# Patient Record
Sex: FEMALE | Race: BLACK OR AFRICAN AMERICAN | NOT HISPANIC OR LATINO | Employment: OTHER | URBAN - METROPOLITAN AREA
[De-identification: names, ages, dates, MRNs, and addresses within clinical notes are randomized per-mention and may not be internally consistent; named-entity substitution may affect disease eponyms.]

---

## 2017-01-12 ENCOUNTER — HOSPITAL ENCOUNTER (OUTPATIENT)
Dept: ONCOLOGY | Facility: CLINIC | Age: 77
Discharge: HOME | End: 2017-01-12
Attending: INTERNAL MEDICINE | Admitting: INTERNAL MEDICINE

## 2017-02-07 ENCOUNTER — HOSPITAL ENCOUNTER (OUTPATIENT)
Dept: ONCOLOGY | Facility: CLINIC | Age: 77
Discharge: HOME | End: 2017-02-07
Attending: INTERNAL MEDICINE | Admitting: INTERNAL MEDICINE

## 2017-03-07 ENCOUNTER — HOSPITAL ENCOUNTER (OUTPATIENT)
Dept: ONCOLOGY | Facility: CLINIC | Age: 77
Discharge: HOME | End: 2017-03-07
Attending: INTERNAL MEDICINE | Admitting: INTERNAL MEDICINE

## 2017-04-07 ENCOUNTER — HOSPITAL ENCOUNTER (OUTPATIENT)
Dept: ONCOLOGY | Facility: CLINIC | Age: 77
Discharge: HOME | End: 2017-04-07
Attending: INTERNAL MEDICINE | Admitting: INTERNAL MEDICINE

## 2017-04-07 ENCOUNTER — HOSPITAL ENCOUNTER (OUTPATIENT)
Dept: OTHER | Facility: HOSPITAL | Age: 77
Discharge: HOME | End: 2017-04-07
Attending: NURSE PRACTITIONER | Admitting: NURSE PRACTITIONER

## 2017-04-07 ENCOUNTER — CONVERSION ENCOUNTER (OUTPATIENT)
Dept: FAMILY MEDICINE CLINIC | Facility: CLINIC | Age: 77
End: 2017-04-07

## 2017-04-14 ENCOUNTER — HOSPITAL ENCOUNTER (OUTPATIENT)
Dept: ONCOLOGY | Facility: CLINIC | Age: 77
Discharge: HOME | End: 2017-04-14
Attending: INTERNAL MEDICINE | Admitting: INTERNAL MEDICINE

## 2017-04-21 ENCOUNTER — HOSPITAL ENCOUNTER (OUTPATIENT)
Dept: ONCOLOGY | Facility: CLINIC | Age: 77
Discharge: HOME | End: 2017-04-21
Attending: INTERNAL MEDICINE | Admitting: INTERNAL MEDICINE

## 2017-04-28 ENCOUNTER — HOSPITAL ENCOUNTER (OUTPATIENT)
Dept: ONCOLOGY | Facility: CLINIC | Age: 77
Discharge: HOME | End: 2017-04-28
Attending: INTERNAL MEDICINE | Admitting: INTERNAL MEDICINE

## 2017-05-26 ENCOUNTER — HOSPITAL ENCOUNTER (OUTPATIENT)
Dept: ONCOLOGY | Facility: CLINIC | Age: 77
Discharge: HOME | End: 2017-05-26
Attending: INTERNAL MEDICINE | Admitting: INTERNAL MEDICINE

## 2017-06-02 ENCOUNTER — HOSPITAL ENCOUNTER (OUTPATIENT)
Dept: ONCOLOGY | Facility: CLINIC | Age: 77
Discharge: HOME | End: 2017-06-02
Attending: INTERNAL MEDICINE | Admitting: INTERNAL MEDICINE

## 2017-06-06 ENCOUNTER — HOSPITAL ENCOUNTER (OUTPATIENT)
Dept: ONCOLOGY | Facility: CLINIC | Age: 77
Discharge: HOME | End: 2017-06-06
Attending: INTERNAL MEDICINE | Admitting: INTERNAL MEDICINE

## 2017-06-13 ENCOUNTER — HOSPITAL ENCOUNTER (OUTPATIENT)
Dept: ONCOLOGY | Facility: CLINIC | Age: 77
Discharge: HOME | End: 2017-06-13
Attending: INTERNAL MEDICINE | Admitting: INTERNAL MEDICINE

## 2017-06-21 ENCOUNTER — HOSPITAL ENCOUNTER (OUTPATIENT)
Dept: ONCOLOGY | Facility: CLINIC | Age: 77
Discharge: HOME | End: 2017-06-21
Attending: INTERNAL MEDICINE | Admitting: INTERNAL MEDICINE

## 2017-06-30 ENCOUNTER — HOSPITAL ENCOUNTER (OUTPATIENT)
Dept: ONCOLOGY | Facility: CLINIC | Age: 77
Discharge: HOME | End: 2017-06-30
Attending: INTERNAL MEDICINE | Admitting: INTERNAL MEDICINE

## 2017-07-28 ENCOUNTER — HOSPITAL ENCOUNTER (OUTPATIENT)
Dept: ONCOLOGY | Facility: CLINIC | Age: 77
Discharge: HOME | End: 2017-07-28
Attending: INTERNAL MEDICINE | Admitting: INTERNAL MEDICINE

## 2017-08-25 ENCOUNTER — HOSPITAL ENCOUNTER (OUTPATIENT)
Dept: ONCOLOGY | Facility: HOSPITAL | Age: 77
Discharge: HOME | End: 2017-08-25
Attending: INTERNAL MEDICINE | Admitting: INTERNAL MEDICINE

## 2017-08-25 ENCOUNTER — HOSPITAL ENCOUNTER (OUTPATIENT)
Dept: ONCOLOGY | Facility: CLINIC | Age: 77
Setting detail: INFUSION SERIES
Discharge: HOME | End: 2017-08-25
Attending: INTERNAL MEDICINE | Admitting: INTERNAL MEDICINE

## 2017-08-25 ENCOUNTER — CLINICAL SUPPORT (OUTPATIENT)
Dept: ONCOLOGY | Facility: HOSPITAL | Age: 77
End: 2017-08-25

## 2017-08-25 NOTE — PROGRESS NOTES
PATIENTS ONCOLOGY RECORD LOCATED IN [facility]      Subjective     Name:  ORLY DOLL     Date:  2017  Address:  [address] [address]  Home: [phone]  :  1940 AGE:  77 y.o.        RECORDS OBTAINED:  Patients Oncology Record is located in [facility]

## 2017-10-03 ENCOUNTER — HOSPITAL ENCOUNTER (OUTPATIENT)
Dept: ONCOLOGY | Facility: HOSPITAL | Age: 77
Discharge: HOME | End: 2017-10-03
Attending: INTERNAL MEDICINE | Admitting: INTERNAL MEDICINE

## 2017-10-03 ENCOUNTER — HOSPITAL ENCOUNTER (OUTPATIENT)
Dept: ONCOLOGY | Facility: CLINIC | Age: 77
Setting detail: INFUSION SERIES
Discharge: HOME | End: 2017-10-03
Attending: INTERNAL MEDICINE | Admitting: INTERNAL MEDICINE

## 2017-10-03 ENCOUNTER — CLINICAL SUPPORT (OUTPATIENT)
Dept: ONCOLOGY | Facility: HOSPITAL | Age: 77
End: 2017-10-03

## 2017-10-03 NOTE — PROGRESS NOTES
PATIENTS ONCOLOGY RECORD LOCATED IN [facility]      Subjective     Name:  ORLY DOLL     Date:  10/03/2017  Address:  [address] [address]  Home: [phone]  :  1940 AGE:  77 y.o.        RECORDS OBTAINED:  Patients Oncology Record is located in [facility]

## 2017-10-16 ENCOUNTER — HOSPITAL ENCOUNTER (OUTPATIENT)
Dept: ONCOLOGY | Facility: HOSPITAL | Age: 77
Discharge: HOME | End: 2017-10-16
Attending: INTERNAL MEDICINE | Admitting: INTERNAL MEDICINE

## 2017-10-16 ENCOUNTER — HOSPITAL ENCOUNTER (OUTPATIENT)
Dept: ONCOLOGY | Facility: CLINIC | Age: 77
Setting detail: INFUSION SERIES
Discharge: HOME | End: 2017-10-16
Attending: INTERNAL MEDICINE | Admitting: INTERNAL MEDICINE

## 2017-10-16 ENCOUNTER — CLINICAL SUPPORT (OUTPATIENT)
Dept: ONCOLOGY | Facility: HOSPITAL | Age: 77
End: 2017-10-16

## 2017-10-16 NOTE — PROGRESS NOTES
PATIENTS ONCOLOGY RECORD LOCATED IN [facility]      Subjective     Name:  ORLY DOLL     Date:  10/16/2017  Address:  [address] [id number]  Home: [phone]  :  1940 AGE:  77 y.o.        RECORDS OBTAINED:  Patients Oncology Record is located in [facility]

## 2017-10-18 ENCOUNTER — CLINICAL SUPPORT (OUTPATIENT)
Dept: ONCOLOGY | Facility: HOSPITAL | Age: 77
End: 2017-10-18

## 2017-10-18 ENCOUNTER — HOSPITAL ENCOUNTER (OUTPATIENT)
Dept: ONCOLOGY | Facility: CLINIC | Age: 77
Setting detail: INFUSION SERIES
Discharge: HOME | End: 2017-10-18
Attending: INTERNAL MEDICINE | Admitting: INTERNAL MEDICINE

## 2017-10-18 ENCOUNTER — HOSPITAL ENCOUNTER (OUTPATIENT)
Dept: ONCOLOGY | Facility: HOSPITAL | Age: 77
Discharge: HOME | End: 2017-10-18
Attending: INTERNAL MEDICINE | Admitting: INTERNAL MEDICINE

## 2017-10-19 ENCOUNTER — HOSPITAL ENCOUNTER (OUTPATIENT)
Dept: ONCOLOGY | Facility: CLINIC | Age: 77
Setting detail: INFUSION SERIES
Discharge: HOME | End: 2017-10-19
Attending: INTERNAL MEDICINE | Admitting: INTERNAL MEDICINE

## 2017-10-19 ENCOUNTER — HOSPITAL ENCOUNTER (OUTPATIENT)
Dept: ONCOLOGY | Facility: HOSPITAL | Age: 77
Discharge: HOME | End: 2017-10-19
Attending: INTERNAL MEDICINE | Admitting: INTERNAL MEDICINE

## 2017-10-19 ENCOUNTER — CLINICAL SUPPORT (OUTPATIENT)
Dept: ONCOLOGY | Facility: HOSPITAL | Age: 77
End: 2017-10-19

## 2017-10-19 NOTE — PROGRESS NOTES
PATIENTS ONCOLOGY RECORD LOCATED IN [facility]      Subjective     Name:  ORLY DOLL     Date:  10/19/2017  Address:  [address] [address]  Home: [phone]  :  1940 AGE:  77 y.o.        RECORDS OBTAINED:  Patients Oncology Record is located in [facility]

## 2017-10-20 ENCOUNTER — CLINICAL SUPPORT (OUTPATIENT)
Dept: ONCOLOGY | Facility: HOSPITAL | Age: 77
End: 2017-10-20

## 2017-10-20 ENCOUNTER — HOSPITAL ENCOUNTER (OUTPATIENT)
Dept: ONCOLOGY | Facility: CLINIC | Age: 77
Setting detail: INFUSION SERIES
Discharge: HOME | End: 2017-10-20
Attending: INTERNAL MEDICINE | Admitting: INTERNAL MEDICINE

## 2017-10-20 ENCOUNTER — HOSPITAL ENCOUNTER (OUTPATIENT)
Dept: ONCOLOGY | Facility: HOSPITAL | Age: 77
Discharge: HOME | End: 2017-10-20
Attending: INTERNAL MEDICINE | Admitting: INTERNAL MEDICINE

## 2017-10-20 NOTE — PROGRESS NOTES
PATIENTS ONCOLOGY RECORD LOCATED IN [facility]      Subjective     Name:  ORLY DOLL     Date:  10/20/2017  Address:  [address] [address]  Home: [phone]  :  1940 AGE:  77 y.o.        RECORDS OBTAINED:  Patients Oncology Record is located in [facility]

## 2017-10-22 ENCOUNTER — HOSPITAL ENCOUNTER (OUTPATIENT)
Dept: INFUSION THERAPY | Facility: HOSPITAL | Age: 77
Discharge: HOME | End: 2017-10-22
Attending: INTERNAL MEDICINE | Admitting: INTERNAL MEDICINE

## 2017-10-22 LAB
INR PPP: 1.6 (ref 2–3)
PROTHROMBIN TIME: 16.5 SEC (ref 19.4–28.5)

## 2017-10-23 ENCOUNTER — CLINICAL SUPPORT (OUTPATIENT)
Dept: ONCOLOGY | Facility: HOSPITAL | Age: 77
End: 2017-10-23

## 2017-10-23 ENCOUNTER — HOSPITAL ENCOUNTER (OUTPATIENT)
Dept: ONCOLOGY | Facility: CLINIC | Age: 77
Setting detail: INFUSION SERIES
Discharge: HOME | End: 2017-10-23
Attending: INTERNAL MEDICINE | Admitting: INTERNAL MEDICINE

## 2017-10-23 ENCOUNTER — HOSPITAL ENCOUNTER (OUTPATIENT)
Dept: ONCOLOGY | Facility: HOSPITAL | Age: 77
Discharge: HOME | End: 2017-10-23
Attending: INTERNAL MEDICINE | Admitting: INTERNAL MEDICINE

## 2017-10-23 NOTE — PROGRESS NOTES
PATIENTS ONCOLOGY RECORD LOCATED IN [facility]      Subjective     Name:  ORLY DOLL     Date:  10/23/2017  Address:  [address] [address]  Home: [phone]  :  1940 AGE:  77 y.o.        RECORDS OBTAINED:  Patients Oncology Record is located in [facility]

## 2017-10-24 ENCOUNTER — HOSPITAL ENCOUNTER (OUTPATIENT)
Dept: ONCOLOGY | Facility: CLINIC | Age: 77
Setting detail: INFUSION SERIES
Discharge: HOME | End: 2017-10-24
Attending: INTERNAL MEDICINE | Admitting: INTERNAL MEDICINE

## 2017-10-24 ENCOUNTER — HOSPITAL ENCOUNTER (OUTPATIENT)
Dept: ONCOLOGY | Facility: HOSPITAL | Age: 77
Discharge: HOME | End: 2017-10-24
Attending: INTERNAL MEDICINE | Admitting: INTERNAL MEDICINE

## 2017-10-24 ENCOUNTER — CLINICAL SUPPORT (OUTPATIENT)
Dept: ONCOLOGY | Facility: HOSPITAL | Age: 77
End: 2017-10-24

## 2017-10-24 NOTE — PROGRESS NOTES
PATIENTS ONCOLOGY RECORD LOCATED IN [facility]      Subjective     Name:  ORLY DOLL     Date:  10/24/2017  Address:  [address] [address]  Home: [phone]  :  1940 AGE:  77 y.o.        RECORDS OBTAINED:  Patients Oncology Record is located in [facility]

## 2017-10-30 ENCOUNTER — HOSPITAL ENCOUNTER (OUTPATIENT)
Dept: ONCOLOGY | Facility: CLINIC | Age: 77
Setting detail: INFUSION SERIES
Discharge: HOME | End: 2017-10-30
Attending: INTERNAL MEDICINE | Admitting: INTERNAL MEDICINE

## 2017-10-30 ENCOUNTER — CLINICAL SUPPORT (OUTPATIENT)
Dept: ONCOLOGY | Facility: HOSPITAL | Age: 77
End: 2017-10-30

## 2017-10-30 ENCOUNTER — HOSPITAL ENCOUNTER (OUTPATIENT)
Dept: ONCOLOGY | Facility: HOSPITAL | Age: 77
Discharge: HOME | End: 2017-10-30
Attending: INTERNAL MEDICINE | Admitting: INTERNAL MEDICINE

## 2017-10-30 LAB
ALBUMIN SERPL-MCNC: 3.3 G/DL (ref 3.5–4.8)
ALBUMIN/GLOB SERPL: 0.8 {RATIO} (ref 1–1.7)
ALP SERPL-CCNC: 154 IU/L (ref 32–91)
ALT SERPL-CCNC: 25 IU/L (ref 14–54)
ANION GAP SERPL CALC-SCNC: 13.2 MMOL/L (ref 10–20)
AST SERPL-CCNC: 32 IU/L (ref 15–41)
BILIRUB SERPL-MCNC: 0.7 MG/DL (ref 0.3–1.2)
BUN SERPL-MCNC: 10 MG/DL (ref 8–20)
BUN/CREAT SERPL: 11.1 (ref 5.4–26.2)
CALCIUM SERPL-MCNC: 9.9 MG/DL (ref 8.9–10.3)
CHLORIDE SERPL-SCNC: 101 MMOL/L (ref 101–111)
CONV CO2: 30 MMOL/L (ref 22–32)
CONV TOTAL PROTEIN: 7.4 G/DL (ref 6.1–7.9)
CREAT UR-MCNC: 0.9 MG/DL (ref 0.4–1)
GLOBULIN UR ELPH-MCNC: 4.1 G/DL (ref 2.5–3.8)
GLUCOSE SERPL-MCNC: 109 MG/DL (ref 65–99)
POTASSIUM SERPL-SCNC: 4.2 MMOL/L (ref 3.6–5.1)
SODIUM SERPL-SCNC: 140 MMOL/L (ref 136–144)

## 2017-10-30 NOTE — PROGRESS NOTES
PATIENTS ONCOLOGY RECORD LOCATED IN [facility]      Subjective     Name:  ORLY DOLL     Date:  10/30/2017  Address:  [address] [id number]  Home: [phone]  :  1940 AGE:  77 y.o.        RECORDS OBTAINED:  Patients Oncology Record is located in [facility]

## 2017-11-07 ENCOUNTER — CLINICAL SUPPORT (OUTPATIENT)
Dept: ONCOLOGY | Facility: HOSPITAL | Age: 77
End: 2017-11-07

## 2017-11-07 ENCOUNTER — HOSPITAL ENCOUNTER (OUTPATIENT)
Dept: ONCOLOGY | Facility: HOSPITAL | Age: 77
Discharge: HOME | End: 2017-11-07
Attending: INTERNAL MEDICINE | Admitting: INTERNAL MEDICINE

## 2017-11-07 ENCOUNTER — HOSPITAL ENCOUNTER (OUTPATIENT)
Dept: ONCOLOGY | Facility: CLINIC | Age: 77
Setting detail: INFUSION SERIES
Discharge: HOME | End: 2017-11-07
Attending: INTERNAL MEDICINE | Admitting: INTERNAL MEDICINE

## 2017-11-07 NOTE — PROGRESS NOTES
PATIENTS ONCOLOGY RECORD LOCATED IN [facility]      Subjective     Name:  ORLY DOLL     Date:  2017  Address:  [address] [address]  Home: [phone]  :  1940 AGE:  77 y.o.        RECORDS OBTAINED:  Patients Oncology Record is located in [facility]

## 2017-11-10 ENCOUNTER — CLINICAL SUPPORT (OUTPATIENT)
Dept: ONCOLOGY | Facility: HOSPITAL | Age: 77
End: 2017-11-10

## 2017-11-10 ENCOUNTER — HOSPITAL ENCOUNTER (OUTPATIENT)
Dept: ONCOLOGY | Facility: CLINIC | Age: 77
Setting detail: INFUSION SERIES
Discharge: HOME | End: 2017-11-10
Attending: INTERNAL MEDICINE | Admitting: INTERNAL MEDICINE

## 2017-11-10 ENCOUNTER — HOSPITAL ENCOUNTER (OUTPATIENT)
Dept: ONCOLOGY | Facility: HOSPITAL | Age: 77
Discharge: HOME | End: 2017-11-10
Attending: INTERNAL MEDICINE | Admitting: INTERNAL MEDICINE

## 2017-11-10 NOTE — PROGRESS NOTES
PATIENTS ONCOLOGY RECORD LOCATED IN [facility]      Subjective     Name:  ORLY DOLL     Date:  11/10/2017  Address:  [address] [address]  Home: [phone]  :  1940 AGE:  77 y.o.        RECORDS OBTAINED:  Patients Oncology Record is located in [facility]

## 2017-11-17 ENCOUNTER — HOSPITAL ENCOUNTER (OUTPATIENT)
Dept: ONCOLOGY | Facility: CLINIC | Age: 77
Setting detail: INFUSION SERIES
Discharge: HOME | End: 2017-11-17
Attending: INTERNAL MEDICINE | Admitting: INTERNAL MEDICINE

## 2017-11-17 ENCOUNTER — HOSPITAL ENCOUNTER (OUTPATIENT)
Dept: ONCOLOGY | Facility: HOSPITAL | Age: 77
Discharge: HOME | End: 2017-11-17
Attending: INTERNAL MEDICINE | Admitting: INTERNAL MEDICINE

## 2017-11-17 ENCOUNTER — CLINICAL SUPPORT (OUTPATIENT)
Dept: ONCOLOGY | Facility: HOSPITAL | Age: 77
End: 2017-11-17

## 2017-11-17 NOTE — PROGRESS NOTES
PATIENTS ONCOLOGY RECORD LOCATED IN [facility]      Subjective     Name:  ORLY DOLL     Date:  2017  Address:  [address] [address]  Home: [phone]  :  1940 AGE:  77 y.o.        RECORDS OBTAINED:  Patients Oncology Record is located in [facility]

## 2017-11-20 ENCOUNTER — CONVERSION ENCOUNTER (OUTPATIENT)
Dept: FAMILY MEDICINE CLINIC | Facility: CLINIC | Age: 77
End: 2017-11-20

## 2017-12-15 ENCOUNTER — CLINICAL SUPPORT (OUTPATIENT)
Dept: ONCOLOGY | Facility: HOSPITAL | Age: 77
End: 2017-12-15

## 2017-12-15 ENCOUNTER — HOSPITAL ENCOUNTER (OUTPATIENT)
Dept: ONCOLOGY | Facility: CLINIC | Age: 77
Setting detail: INFUSION SERIES
Discharge: HOME | End: 2017-12-15
Attending: INTERNAL MEDICINE | Admitting: INTERNAL MEDICINE

## 2017-12-15 ENCOUNTER — HOSPITAL ENCOUNTER (OUTPATIENT)
Dept: ONCOLOGY | Facility: HOSPITAL | Age: 77
Discharge: HOME | End: 2017-12-15
Attending: INTERNAL MEDICINE | Admitting: INTERNAL MEDICINE

## 2017-12-15 LAB
INR PPP: ABNORMAL
PROTHROMBIN TIME: 60.6 SEC (ref 9.6–11.7)

## 2017-12-15 NOTE — PROGRESS NOTES
PATIENTS ONCOLOGY RECORD LOCATED IN [facility]      Subjective     Name:  ORLY DOLL     Date:  12/15/2017  Address:  [address] [address]  Home: [phone]  :  1940 AGE:  77 y.o.        RECORDS OBTAINED:  Patients Oncology Record is located in [facility]

## 2017-12-17 ENCOUNTER — HOSPITAL ENCOUNTER (OUTPATIENT)
Dept: LAB | Facility: HOSPITAL | Age: 77
Discharge: HOME | End: 2017-12-17
Attending: INTERNAL MEDICINE | Admitting: INTERNAL MEDICINE

## 2017-12-17 LAB
INR PPP: ABNORMAL
INR PPP: ABNORMAL
PROTHROMBIN TIME: 44 SEC (ref 9.6–11.7)

## 2017-12-19 ENCOUNTER — HOSPITAL ENCOUNTER (OUTPATIENT)
Dept: ONCOLOGY | Facility: CLINIC | Age: 77
Setting detail: INFUSION SERIES
Discharge: HOME | End: 2017-12-19
Attending: INTERNAL MEDICINE | Admitting: INTERNAL MEDICINE

## 2017-12-19 ENCOUNTER — HOSPITAL ENCOUNTER (OUTPATIENT)
Dept: ONCOLOGY | Facility: HOSPITAL | Age: 77
Discharge: HOME | End: 2017-12-19
Attending: INTERNAL MEDICINE | Admitting: INTERNAL MEDICINE

## 2017-12-19 ENCOUNTER — CLINICAL SUPPORT (OUTPATIENT)
Dept: ONCOLOGY | Facility: HOSPITAL | Age: 77
End: 2017-12-19

## 2017-12-19 NOTE — PROGRESS NOTES
PATIENTS ONCOLOGY RECORD LOCATED IN [facility]      Subjective     Name:  ORLY DOLL     Date:  2017  Address:  [address] [address]  Home: [phone]  :  1940 AGE:  77 y.o.        RECORDS OBTAINED:  Patients Oncology Record is located in [facility]

## 2017-12-27 ENCOUNTER — HOSPITAL ENCOUNTER (OUTPATIENT)
Dept: ONCOLOGY | Facility: HOSPITAL | Age: 77
Discharge: HOME | End: 2017-12-27
Attending: INTERNAL MEDICINE | Admitting: INTERNAL MEDICINE

## 2017-12-27 ENCOUNTER — CLINICAL SUPPORT (OUTPATIENT)
Dept: ONCOLOGY | Facility: HOSPITAL | Age: 77
End: 2017-12-27

## 2017-12-27 ENCOUNTER — HOSPITAL ENCOUNTER (OUTPATIENT)
Dept: ONCOLOGY | Facility: CLINIC | Age: 77
Setting detail: INFUSION SERIES
Discharge: HOME | End: 2017-12-27
Attending: INTERNAL MEDICINE | Admitting: INTERNAL MEDICINE

## 2017-12-27 LAB
INR PPP: 1.4 (ref 2–3)
PROTHROMBIN TIME: 14.8 SEC (ref 19.4–28.5)

## 2017-12-27 NOTE — PROGRESS NOTES
PATIENTS ONCOLOGY RECORD LOCATED IN [facility]      Subjective     Name:  ORLY DOLL     Date:  2017  Address:  [address] [address]  Home: [phone]  :  1940 AGE:  77 y.o.        RECORDS OBTAINED:  Patients Oncology Record is located in [facility]

## 2018-01-04 ENCOUNTER — CLINICAL SUPPORT (OUTPATIENT)
Dept: ONCOLOGY | Facility: HOSPITAL | Age: 78
End: 2018-01-04

## 2018-01-04 ENCOUNTER — HOSPITAL ENCOUNTER (OUTPATIENT)
Dept: ONCOLOGY | Facility: CLINIC | Age: 78
Setting detail: INFUSION SERIES
Discharge: HOME | End: 2018-01-04
Attending: INTERNAL MEDICINE | Admitting: INTERNAL MEDICINE

## 2018-01-05 NOTE — PROGRESS NOTES
PATIENTS ONCOLOGY RECORD LOCATED IN [facility]      Subjective     Name:  ORLY DOLL     Date:  2018  Address:  [address] [address]  Home: [phone]  :  1940 AGE:  77 y.o.        RECORDS OBTAINED:  Patients Oncology Record is located in [facility]

## 2018-01-11 ENCOUNTER — CLINICAL SUPPORT (OUTPATIENT)
Dept: ONCOLOGY | Facility: HOSPITAL | Age: 78
End: 2018-01-11

## 2018-01-11 ENCOUNTER — HOSPITAL ENCOUNTER (OUTPATIENT)
Dept: ONCOLOGY | Facility: CLINIC | Age: 78
Setting detail: INFUSION SERIES
Discharge: HOME | End: 2018-01-11
Attending: INTERNAL MEDICINE | Admitting: INTERNAL MEDICINE

## 2018-01-12 NOTE — PROGRESS NOTES
PATIENTS ONCOLOGY RECORD LOCATED IN [facility]      Subjective     Name:  ORLY DOLL     Date:  2018  Address:  [address] [address]  Home: [phone]  :  1940 AGE:  77 y.o.        RECORDS OBTAINED:  Patients Oncology Record is located in [facility]

## 2018-01-18 ENCOUNTER — HOSPITAL ENCOUNTER (OUTPATIENT)
Dept: ONCOLOGY | Facility: CLINIC | Age: 78
Setting detail: INFUSION SERIES
Discharge: HOME | End: 2018-01-18
Attending: INTERNAL MEDICINE | Admitting: INTERNAL MEDICINE

## 2018-01-18 ENCOUNTER — CLINICAL SUPPORT (OUTPATIENT)
Dept: ONCOLOGY | Facility: HOSPITAL | Age: 78
End: 2018-01-18

## 2018-01-18 NOTE — PROGRESS NOTES
PATIENTS ONCOLOGY RECORD LOCATED IN [facility]      Subjective     Name:  ORLY DOLL     Date:  2018  Address:  [address] [address]  Home: [phone]  :  1940 AGE:  77 y.o.        RECORDS OBTAINED:  Patients Oncology Record is located in [facility]

## 2018-02-06 ENCOUNTER — CLINICAL SUPPORT (OUTPATIENT)
Dept: ONCOLOGY | Facility: HOSPITAL | Age: 78
End: 2018-02-06

## 2018-02-06 ENCOUNTER — HOSPITAL ENCOUNTER (OUTPATIENT)
Dept: ONCOLOGY | Facility: CLINIC | Age: 78
Setting detail: INFUSION SERIES
Discharge: HOME | End: 2018-02-06
Attending: INTERNAL MEDICINE | Admitting: INTERNAL MEDICINE

## 2018-02-06 NOTE — PROGRESS NOTES
PATIENTS ONCOLOGY RECORD LOCATED IN [facility]      Subjective     Name:  ORLY DOLL     Date:  2018  Address:  [address] [address]  Home: [phone]  :  1940 AGE:  77 y.o.        RECORDS OBTAINED:  Patients Oncology Record is located in [facility]

## 2018-03-06 ENCOUNTER — HOSPITAL ENCOUNTER (OUTPATIENT)
Dept: ONCOLOGY | Facility: CLINIC | Age: 78
Setting detail: INFUSION SERIES
Discharge: HOME | End: 2018-03-06
Attending: INTERNAL MEDICINE | Admitting: INTERNAL MEDICINE

## 2018-03-06 ENCOUNTER — CLINICAL SUPPORT (OUTPATIENT)
Dept: ONCOLOGY | Facility: HOSPITAL | Age: 78
End: 2018-03-06

## 2018-03-06 NOTE — PROGRESS NOTES
PATIENTS ONCOLOGY RECORD LOCATED IN [facility]      Subjective     Name:  ORLY DOLL     Date:  2018  Address:  [address] [address]  Home: [phone]  :  1940 AGE:  77 y.o.        RECORDS OBTAINED:  Patients Oncology Record is located in [facility]

## 2018-04-06 ENCOUNTER — CLINICAL SUPPORT (OUTPATIENT)
Dept: ONCOLOGY | Facility: HOSPITAL | Age: 78
End: 2018-04-06

## 2018-04-06 ENCOUNTER — HOSPITAL ENCOUNTER (OUTPATIENT)
Dept: ONCOLOGY | Facility: CLINIC | Age: 78
Setting detail: INFUSION SERIES
Discharge: HOME | End: 2018-04-06
Attending: INTERNAL MEDICINE | Admitting: INTERNAL MEDICINE

## 2018-04-06 NOTE — PROGRESS NOTES
PATIENTS ONCOLOGY RECORD LOCATED IN [facility]      Subjective     Name:  ORLY DOLL     Date:  2018  Address:  [address] [address]  Home: [phone]  :  1940 AGE:  77 y.o.        RECORDS OBTAINED:  Patients Oncology Record is located in [facility]

## 2018-04-13 ENCOUNTER — HOSPITAL ENCOUNTER (OUTPATIENT)
Dept: ONCOLOGY | Facility: CLINIC | Age: 78
Setting detail: INFUSION SERIES
Discharge: HOME | End: 2018-04-13
Attending: INTERNAL MEDICINE | Admitting: INTERNAL MEDICINE

## 2018-04-13 ENCOUNTER — CLINICAL SUPPORT (OUTPATIENT)
Dept: ONCOLOGY | Facility: HOSPITAL | Age: 78
End: 2018-04-13

## 2018-04-13 NOTE — PROGRESS NOTES
PATIENTS ONCOLOGY RECORD LOCATED IN [facility]      Subjective     Name:  ORLY DOLL     Date:  2018  Address:  [address] [address]  Home: [phone]  :  1940 AGE:  77 y.o.        RECORDS OBTAINED:  Patients Oncology Record is located in [facility]

## 2018-04-20 ENCOUNTER — CLINICAL SUPPORT (OUTPATIENT)
Dept: ONCOLOGY | Facility: HOSPITAL | Age: 78
End: 2018-04-20

## 2018-04-20 ENCOUNTER — HOSPITAL ENCOUNTER (OUTPATIENT)
Dept: ONCOLOGY | Facility: CLINIC | Age: 78
Setting detail: INFUSION SERIES
Discharge: HOME | End: 2018-04-20
Attending: INTERNAL MEDICINE | Admitting: INTERNAL MEDICINE

## 2018-04-20 NOTE — PROGRESS NOTES
PATIENTS ONCOLOGY RECORD LOCATED IN [email or web address]      Subjective     Name:  ORLY DOLL     Date:  2018  Address:  [address] [address]  Home: [phone]  :  1940 AGE:  77 y.o.        RECORDS OBTAINED:  Patients Oncology Record is located in [facility]

## 2018-05-10 ENCOUNTER — CONVERSION ENCOUNTER (OUTPATIENT)
Dept: FAMILY MEDICINE CLINIC | Facility: CLINIC | Age: 78
End: 2018-05-10

## 2018-05-18 ENCOUNTER — CLINICAL SUPPORT (OUTPATIENT)
Dept: ONCOLOGY | Facility: HOSPITAL | Age: 78
End: 2018-05-18

## 2018-05-18 ENCOUNTER — HOSPITAL ENCOUNTER (OUTPATIENT)
Dept: ONCOLOGY | Facility: CLINIC | Age: 78
Setting detail: INFUSION SERIES
Discharge: HOME | End: 2018-05-18
Attending: INTERNAL MEDICINE | Admitting: INTERNAL MEDICINE

## 2018-05-18 NOTE — PROGRESS NOTES
PATIENTS ONCOLOGY RECORD LOCATED IN [facility]      Subjective     Name:  ORLY DOLL     Date:  2018  Address:  [address] [address]  Home: [phone]  :  1940 AGE:  77 y.o.        RECORDS OBTAINED:  Patients Oncology Record is located in [facility]

## 2018-06-05 ENCOUNTER — HOSPITAL ENCOUNTER (OUTPATIENT)
Dept: MAMMOGRAPHY | Facility: HOSPITAL | Age: 78
Discharge: HOME | End: 2018-06-05
Attending: FAMILY MEDICINE | Admitting: FAMILY MEDICINE

## 2018-06-05 ENCOUNTER — HOSPITAL ENCOUNTER (OUTPATIENT)
Dept: ONCOLOGY | Facility: HOSPITAL | Age: 78
Discharge: HOME | End: 2018-06-05
Attending: FAMILY MEDICINE | Admitting: FAMILY MEDICINE

## 2018-06-05 ENCOUNTER — HOSPITAL ENCOUNTER (OUTPATIENT)
Dept: ONCOLOGY | Facility: CLINIC | Age: 78
Setting detail: INFUSION SERIES
Discharge: HOME | End: 2018-06-05
Attending: INTERNAL MEDICINE | Admitting: INTERNAL MEDICINE

## 2018-06-05 ENCOUNTER — CLINICAL SUPPORT (OUTPATIENT)
Dept: ONCOLOGY | Facility: HOSPITAL | Age: 78
End: 2018-06-05

## 2018-06-05 LAB
ALBUMIN SERPL-MCNC: 3.3 G/DL (ref 3.5–4.8)
ALBUMIN/GLOB SERPL: 0.9 {RATIO} (ref 1–1.7)
ALP SERPL-CCNC: 151 IU/L (ref 32–91)
ALT SERPL-CCNC: 29 IU/L (ref 14–54)
ANION GAP SERPL CALC-SCNC: 12.9 MMOL/L (ref 10–20)
AST SERPL-CCNC: 25 IU/L (ref 15–41)
BILIRUB SERPL-MCNC: 0.6 MG/DL (ref 0.3–1.2)
BUN SERPL-MCNC: 13 MG/DL (ref 8–20)
BUN/CREAT SERPL: 16.3 (ref 5.4–26.2)
CALCIUM SERPL-MCNC: 9.8 MG/DL (ref 8.9–10.3)
CHLORIDE SERPL-SCNC: 101 MMOL/L (ref 101–111)
CHOLEST SERPL-MCNC: 166 MG/DL
CHOLEST/HDLC SERPL: 2.3 {RATIO}
CONV CO2: 27 MMOL/L (ref 22–32)
CONV LDL CHOLESTEROL DIRECT: 81 MG/DL (ref 0–100)
CONV TOTAL PROTEIN: 6.9 G/DL (ref 6.1–7.9)
CREAT UR-MCNC: 0.8 MG/DL (ref 0.4–1)
GLOBULIN UR ELPH-MCNC: 3.6 G/DL (ref 2.5–3.8)
GLUCOSE SERPL-MCNC: 97 MG/DL (ref 65–99)
HDLC SERPL-MCNC: 71 MG/DL
LDLC/HDLC SERPL: 1.1 {RATIO}
LIPID INTERPRETATION: NORMAL
POTASSIUM SERPL-SCNC: 2.9 MMOL/L (ref 3.6–5.1)
SODIUM SERPL-SCNC: 138 MMOL/L (ref 136–144)
TRIGL SERPL-MCNC: 73 MG/DL
VLDLC SERPL CALC-MCNC: 14.4 MG/DL

## 2018-06-05 NOTE — PROGRESS NOTES
PATIENTS ONCOLOGY RECORD LOCATED IN [facility]      Subjective     Name:  ORLY DOLL     Date:  2018  Address:  [address] [address]  Home: [phone]  :  1940 AGE:  77 y.o.        RECORDS OBTAINED:  Patients Oncology Record is located in [facility]

## 2018-06-29 ENCOUNTER — HOSPITAL ENCOUNTER (OUTPATIENT)
Dept: MAMMOGRAPHY | Facility: HOSPITAL | Age: 78
Discharge: HOME | End: 2018-06-29
Attending: FAMILY MEDICINE | Admitting: FAMILY MEDICINE

## 2018-07-03 ENCOUNTER — HOSPITAL ENCOUNTER (OUTPATIENT)
Dept: ONCOLOGY | Facility: CLINIC | Age: 78
Setting detail: INFUSION SERIES
Discharge: HOME | End: 2018-07-03
Attending: INTERNAL MEDICINE | Admitting: INTERNAL MEDICINE

## 2018-07-03 ENCOUNTER — CLINICAL SUPPORT (OUTPATIENT)
Dept: ONCOLOGY | Facility: HOSPITAL | Age: 78
End: 2018-07-03

## 2018-07-03 NOTE — PROGRESS NOTES
PATIENTS ONCOLOGY RECORD LOCATED IN [facility]      Subjective     Name:  ORLY DOLL     Date:  2018  Address:  [address] [address]  Home: [phone]  :  1940 AGE:  77 y.o.        RECORDS OBTAINED:  Patients Oncology Record is located in [facility]

## 2018-08-02 ENCOUNTER — CLINICAL SUPPORT (OUTPATIENT)
Dept: ONCOLOGY | Facility: HOSPITAL | Age: 78
End: 2018-08-02

## 2018-08-02 ENCOUNTER — HOSPITAL ENCOUNTER (OUTPATIENT)
Dept: ONCOLOGY | Facility: HOSPITAL | Age: 78
Discharge: HOME | End: 2018-08-02
Attending: FAMILY MEDICINE | Admitting: FAMILY MEDICINE

## 2018-08-02 ENCOUNTER — HOSPITAL ENCOUNTER (OUTPATIENT)
Dept: ONCOLOGY | Facility: CLINIC | Age: 78
Setting detail: INFUSION SERIES
Discharge: HOME | End: 2018-08-02
Attending: INTERNAL MEDICINE | Admitting: INTERNAL MEDICINE

## 2018-08-02 LAB
ALBUMIN SERPL-MCNC: 3.2 G/DL (ref 3.5–4.8)
ALBUMIN/GLOB SERPL: 0.8 {RATIO} (ref 1–1.7)
ALP SERPL-CCNC: 182 IU/L (ref 32–91)
ALT SERPL-CCNC: 46 IU/L (ref 14–54)
ANION GAP SERPL CALC-SCNC: 11.4 MMOL/L (ref 10–20)
AST SERPL-CCNC: 43 IU/L (ref 15–41)
BILIRUB SERPL-MCNC: 1 MG/DL (ref 0.3–1.2)
BUN SERPL-MCNC: 11 MG/DL (ref 8–20)
BUN/CREAT SERPL: 11 (ref 5.4–26.2)
CALCIUM SERPL-MCNC: 9.7 MG/DL (ref 8.9–10.3)
CHLORIDE SERPL-SCNC: 102 MMOL/L (ref 101–111)
CONV CO2: 33 MMOL/L (ref 22–32)
CONV TOTAL PROTEIN: 7.1 G/DL (ref 6.1–7.9)
CREAT UR-MCNC: 1 MG/DL (ref 0.4–1)
GLOBULIN UR ELPH-MCNC: 3.9 G/DL (ref 2.5–3.8)
GLUCOSE SERPL-MCNC: 97 MG/DL (ref 65–99)
POTASSIUM SERPL-SCNC: 4.4 MMOL/L (ref 3.6–5.1)
SODIUM SERPL-SCNC: 142 MMOL/L (ref 136–144)

## 2018-08-02 NOTE — PROGRESS NOTES
PATIENTS ONCOLOGY RECORD LOCATED IN [facility]      Subjective     Name:  ORLY DOLL     Date:  2018  Address:  [address] [address]  Home: [phone]  :  1940 AGE:  78 y.o.        RECORDS OBTAINED:  Patients Oncology Record is located in [facility]

## 2018-09-07 ENCOUNTER — HOSPITAL ENCOUNTER (OUTPATIENT)
Dept: ONCOLOGY | Facility: CLINIC | Age: 78
Setting detail: INFUSION SERIES
Discharge: HOME | End: 2018-09-07
Attending: INTERNAL MEDICINE | Admitting: INTERNAL MEDICINE

## 2018-09-07 ENCOUNTER — CLINICAL SUPPORT (OUTPATIENT)
Dept: ONCOLOGY | Facility: HOSPITAL | Age: 78
End: 2018-09-07

## 2018-09-07 NOTE — PROGRESS NOTES
PATIENTS ONCOLOGY RECORD LOCATED IN [facility]      Subjective     Name:  ORLY DOLL     Date:  2018  Address:  [address] [address]  Home: [phone]  :  1940 AGE:  78 y.o.        RECORDS OBTAINED:  Patients Oncology Record is located in [facility]

## 2018-10-18 ENCOUNTER — CLINICAL SUPPORT (OUTPATIENT)
Dept: ONCOLOGY | Facility: HOSPITAL | Age: 78
End: 2018-10-18

## 2018-10-18 ENCOUNTER — HOSPITAL ENCOUNTER (OUTPATIENT)
Dept: ONCOLOGY | Facility: CLINIC | Age: 78
Setting detail: INFUSION SERIES
Discharge: HOME | End: 2018-10-18
Attending: INTERNAL MEDICINE | Admitting: INTERNAL MEDICINE

## 2018-10-18 NOTE — PROGRESS NOTES
PATIENTS ONCOLOGY RECORD LOCATED IN [facility]      Subjective     Name:  ORLY DOLL     Date:  10/18/2018  Address:  [address] [address]  Home: [phone]  :  1940 AGE:  78 y.o.        RECORDS OBTAINED:  Patients Oncology Record is located in [facility]

## 2018-11-16 ENCOUNTER — HOSPITAL ENCOUNTER (OUTPATIENT)
Dept: ONCOLOGY | Facility: CLINIC | Age: 78
Setting detail: INFUSION SERIES
Discharge: HOME | End: 2018-11-16
Attending: INTERNAL MEDICINE | Admitting: INTERNAL MEDICINE

## 2018-11-16 ENCOUNTER — CLINICAL SUPPORT (OUTPATIENT)
Dept: ONCOLOGY | Facility: HOSPITAL | Age: 78
End: 2018-11-16

## 2018-11-16 NOTE — PROGRESS NOTES
PATIENTS ONCOLOGY RECORD LOCATED IN [facility]      Subjective     Name:  ORLY DOLL     Date:  2018  Address:  [address] [address]  Home: [unfilled]  :  1940 AGE:  78 y.o.        RECORDS OBTAINED:  Patients Oncology Record is located in [facility]

## 2018-12-20 ENCOUNTER — HOSPITAL ENCOUNTER (OUTPATIENT)
Dept: ONCOLOGY | Facility: CLINIC | Age: 78
Setting detail: INFUSION SERIES
Discharge: HOME | End: 2018-12-20
Attending: INTERNAL MEDICINE | Admitting: INTERNAL MEDICINE

## 2018-12-20 ENCOUNTER — CLINICAL SUPPORT (OUTPATIENT)
Dept: ONCOLOGY | Facility: HOSPITAL | Age: 78
End: 2018-12-20

## 2018-12-20 NOTE — PROGRESS NOTES
PATIENTS ONCOLOGY RECORD LOCATED IN [facility]      Subjective     Name:  ORLY DOLL     Date:  2018  Address:  [address] [address]  Home: [unfilled]  :  1940 AGE:  78 y.o.        RECORDS OBTAINED:  Patients Oncology Record is located in [facility]

## 2019-01-17 ENCOUNTER — HOSPITAL ENCOUNTER (OUTPATIENT)
Dept: ONCOLOGY | Facility: CLINIC | Age: 79
Setting detail: INFUSION SERIES
Discharge: HOME | End: 2019-01-17
Attending: INTERNAL MEDICINE | Admitting: INTERNAL MEDICINE

## 2019-01-17 ENCOUNTER — CLINICAL SUPPORT (OUTPATIENT)
Dept: ONCOLOGY | Facility: HOSPITAL | Age: 79
End: 2019-01-17

## 2019-01-17 NOTE — PROGRESS NOTES
PATIENTS ONCOLOGY RECORD LOCATED IN [facility]      Subjective     Name:  ORLY DOLL     Date:  2019  Address:  [address] [address]  Home: [unfilled]  :  1940 AGE:  78 y.o.        RECORDS OBTAINED:  Patients Oncology Record is located in [facility]

## 2019-02-21 ENCOUNTER — HOSPITAL ENCOUNTER (OUTPATIENT)
Dept: ONCOLOGY | Facility: HOSPITAL | Age: 79
Discharge: HOME | End: 2019-02-21
Attending: INTERNAL MEDICINE | Admitting: INTERNAL MEDICINE

## 2019-02-21 ENCOUNTER — CLINICAL SUPPORT (OUTPATIENT)
Dept: ONCOLOGY | Facility: HOSPITAL | Age: 79
End: 2019-02-21

## 2019-02-21 ENCOUNTER — HOSPITAL ENCOUNTER (OUTPATIENT)
Dept: ONCOLOGY | Facility: CLINIC | Age: 79
Setting detail: INFUSION SERIES
Discharge: HOME | End: 2019-02-21
Attending: INTERNAL MEDICINE | Admitting: INTERNAL MEDICINE

## 2019-02-21 LAB
ALBUMIN SERPL-MCNC: 3.2 G/DL (ref 3.5–4.8)
ALBUMIN/GLOB SERPL: 0.8 {RATIO} (ref 1–1.7)
ALP SERPL-CCNC: 145 IU/L (ref 32–91)
ALT SERPL-CCNC: 21 IU/L (ref 14–54)
ANION GAP SERPL CALC-SCNC: 16.4 MMOL/L (ref 10–20)
AST SERPL-CCNC: 26 IU/L (ref 15–41)
BILIRUB SERPL-MCNC: 0.8 MG/DL (ref 0.3–1.2)
BUN SERPL-MCNC: 5 MG/DL (ref 8–20)
BUN/CREAT SERPL: 7.1 (ref 5.4–26.2)
CALCIUM SERPL-MCNC: 9.4 MG/DL (ref 8.9–10.3)
CHLORIDE SERPL-SCNC: 99 MMOL/L (ref 101–111)
CONV CO2: 27 MMOL/L (ref 22–32)
CONV TOTAL PROTEIN: 7 G/DL (ref 6.1–7.9)
CREAT UR-MCNC: 0.7 MG/DL (ref 0.4–1)
GLOBULIN UR ELPH-MCNC: 3.8 G/DL (ref 2.5–3.8)
GLUCOSE SERPL-MCNC: 93 MG/DL (ref 65–99)
POTASSIUM SERPL-SCNC: 3.4 MMOL/L (ref 3.6–5.1)
SODIUM SERPL-SCNC: 139 MMOL/L (ref 136–144)

## 2019-02-21 NOTE — PROGRESS NOTES
PATIENTS ONCOLOGY RECORD LOCATED IN [facility]      Subjective     Name:  ORLY DOLL     Date:  2019  Address:  [address] [address]  Home: [unfilled]  :  1940 AGE:  78 y.o.        RECORDS OBTAINED:  Patients Oncology Record is located in [facility]

## 2019-03-21 ENCOUNTER — CLINICAL SUPPORT (OUTPATIENT)
Dept: ONCOLOGY | Facility: HOSPITAL | Age: 79
End: 2019-03-21

## 2019-03-21 ENCOUNTER — HOSPITAL ENCOUNTER (OUTPATIENT)
Dept: ONCOLOGY | Facility: CLINIC | Age: 79
Setting detail: INFUSION SERIES
Discharge: HOME | End: 2019-03-21
Attending: INTERNAL MEDICINE | Admitting: INTERNAL MEDICINE

## 2019-03-21 NOTE — PROGRESS NOTES
PATIENTS ONCOLOGY RECORD LOCATED IN [facility]      Subjective     Name:  ORLY DOLL     Date:  2019  Address:  [address] [address]  Home: [unfilled]  :  1940 AGE:  78 y.o.        RECORDS OBTAINED:  Patients Oncology Record is located in [facility]

## 2019-04-25 ENCOUNTER — CLINICAL SUPPORT (OUTPATIENT)
Dept: ONCOLOGY | Facility: HOSPITAL | Age: 79
End: 2019-04-25

## 2019-04-25 ENCOUNTER — HOSPITAL ENCOUNTER (OUTPATIENT)
Dept: ONCOLOGY | Facility: CLINIC | Age: 79
Setting detail: INFUSION SERIES
Discharge: HOME | End: 2019-04-25
Attending: INTERNAL MEDICINE | Admitting: INTERNAL MEDICINE

## 2019-04-25 NOTE — PROGRESS NOTES
PATIENTS ONCOLOGY RECORD LOCATED IN [facility]      Subjective     Name:  ORLY DOLL     Date:  2019  Address:  [address] [address]  Home: [unfilled]  :  1940 AGE:  78 y.o.        RECORDS OBTAINED:  Patients Oncology Record is located in [facility]

## 2019-05-23 ENCOUNTER — HOSPITAL ENCOUNTER (OUTPATIENT)
Dept: ONCOLOGY | Facility: CLINIC | Age: 79
Setting detail: INFUSION SERIES
Discharge: HOME | End: 2019-05-23
Attending: INTERNAL MEDICINE | Admitting: INTERNAL MEDICINE

## 2019-05-23 ENCOUNTER — CLINICAL SUPPORT (OUTPATIENT)
Dept: ONCOLOGY | Facility: HOSPITAL | Age: 79
End: 2019-05-23

## 2019-05-23 NOTE — PROGRESS NOTES
PATIENTS ONCOLOGY RECORD LOCATED IN [facility]      Subjective     Name:  ORLY DOLL     Date:  2019  Address:  [address] [address]  Home: [unfilled]  :  1940 AGE:  78 y.o.        RECORDS OBTAINED:  Patients Oncology Record is located in [facility]

## 2019-06-04 VITALS
RESPIRATION RATE: 16 BRPM | RESPIRATION RATE: 18 BRPM | OXYGEN SATURATION: 95 % | BODY MASS INDEX: 38.4 KG/M2 | BODY MASS INDEX: 37.18 KG/M2 | DIASTOLIC BLOOD PRESSURE: 78 MMHG | DIASTOLIC BLOOD PRESSURE: 80 MMHG | HEIGHT: 65 IN | HEART RATE: 87 BPM | HEIGHT: 65 IN | SYSTOLIC BLOOD PRESSURE: 116 MMHG | DIASTOLIC BLOOD PRESSURE: 74 MMHG | HEART RATE: 108 BPM | OXYGEN SATURATION: 95 % | BODY MASS INDEX: 36.55 KG/M2 | WEIGHT: 230.5 LBS | OXYGEN SATURATION: 96 % | RESPIRATION RATE: 16 BRPM | SYSTOLIC BLOOD PRESSURE: 131 MMHG | HEIGHT: 65 IN | WEIGHT: 223.13 LBS | WEIGHT: 219.4 LBS | SYSTOLIC BLOOD PRESSURE: 138 MMHG | HEART RATE: 93 BPM

## 2019-06-18 RX ORDER — POTASSIUM CHLORIDE 20 MEQ/1
20 TABLET, EXTENDED RELEASE ORAL DAILY
COMMUNITY
End: 2019-09-09 | Stop reason: SDUPTHER

## 2019-06-18 RX ORDER — ALLOPURINOL 300 MG/1
300 TABLET ORAL DAILY
COMMUNITY
End: 2019-08-30 | Stop reason: SDUPTHER

## 2019-06-18 RX ORDER — CHOLECALCIFEROL (VITAMIN D3) 50 MCG
2000 TABLET ORAL DAILY
COMMUNITY

## 2019-06-18 RX ORDER — ATORVASTATIN CALCIUM 10 MG/1
10 TABLET, FILM COATED ORAL DAILY
COMMUNITY
End: 2019-08-15 | Stop reason: SDUPTHER

## 2019-06-18 RX ORDER — TRIAMTERENE AND HYDROCHLOROTHIAZIDE 75; 50 MG/1; MG/1
1 TABLET ORAL DAILY
COMMUNITY
End: 2019-08-30 | Stop reason: SDUPTHER

## 2019-06-20 ENCOUNTER — OFFICE VISIT (OUTPATIENT)
Dept: ONCOLOGY | Facility: CLINIC | Age: 79
End: 2019-06-20

## 2019-06-20 VITALS
SYSTOLIC BLOOD PRESSURE: 136 MMHG | BODY MASS INDEX: 34.84 KG/M2 | TEMPERATURE: 98.1 F | DIASTOLIC BLOOD PRESSURE: 72 MMHG | HEART RATE: 108 BPM | WEIGHT: 216.8 LBS | RESPIRATION RATE: 18 BRPM | HEIGHT: 66 IN

## 2019-06-20 DIAGNOSIS — I27.82 OTHER CHRONIC PULMONARY EMBOLISM WITHOUT ACUTE COR PULMONALE (HCC): Primary | ICD-10-CM

## 2019-06-20 DIAGNOSIS — I26.99 OTHER PULMONARY EMBOLISM WITHOUT ACUTE COR PULMONALE, UNSPECIFIED CHRONICITY (HCC): ICD-10-CM

## 2019-06-20 PROBLEM — R76.0 ANTICARDIOLIPIN ANTIBODY POSITIVE: Status: ACTIVE | Noted: 2019-06-20

## 2019-06-20 PROBLEM — I82.220 IVC THROMBOSIS (HCC): Status: ACTIVE | Noted: 2019-06-20

## 2019-06-20 LAB — INR PPP: 3.6 (ref 2–3)

## 2019-06-20 PROCEDURE — 85610 PROTHROMBIN TIME: CPT | Performed by: INTERNAL MEDICINE

## 2019-06-20 PROCEDURE — 36416 COLLJ CAPILLARY BLOOD SPEC: CPT | Performed by: INTERNAL MEDICINE

## 2019-06-20 PROCEDURE — 99214 OFFICE O/P EST MOD 30 MIN: CPT | Performed by: INTERNAL MEDICINE

## 2019-06-20 NOTE — PROGRESS NOTES
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      • Follow-up     monitoring coumadin therapy     Subjective     The patient is here today for a follow up visit.  She has not had her dental work completed.  She denies any bleeding issues, nausea or vomiting, fevers or chills.  She has not had any hospitalizations since the last visit.         HPI   HISTORY OF PRESENT ILLNESS:  This is a 78-year-old female who has a history of pulmonary embolism   at the age of 25 [more than 50 years ago].  Subsequently she developed deep venous thrombosis and   patient was treated with Warfarin therapy at the time.  She has a strong family  history of blood   clots in her mother, brother, son and sister, but there is no known mutation yet identified in   the family.  Patient was seen by Amalia Salamanca with I and had an MRI of the abdomen to evaluate   elevated liver function tests.  The liver showed evidence of fatty infiltration, but there was no   mass identified.  The kidneys were normal.  The spleen did not show any abnormalities, as well as   the pancreas.  There was a filling defect noted in the inferior vena cava below the renal veins,   thought to represent some partial thrombosis.  The patient was then placed on Lovenox 40 mg   subcutaneously daily and then referred for further evaluation and management of her   thrombophilia.  Patient denies any new symptoms such as weight loss, night sweats or fatigue.    She is able to carry out her own activities of daily living.  Her last colonoscopy was about   three to four years ago and she is up to date on her routine mammograms.  Patient is accompanied   by her daughter today for this appointment.      · Since her last visit on 5/26/16, patient was initiated on anticoagulation  with Warfarin.    Anticardiolipin IgG antibody was slightly high at 24 [normal < 23], beta-2 glycoprotein was   negative.  Factor V Leiden was not mutated.  Prothrombin gene was not mutated as well.  PT 12.4,   INR 1, PTT 27.  Protein C activity (N) 129.  Antithrombin III activity (N) 99.  Protein S   activity (N) 85%.          Past Medical History:   Diagnosis Date   • Arthritis    • Deep venous thrombosis (CMS/HCC)    • Hyperlipidemia    • Hypertension    • Pulmonary embolism (CMS/HCC)        Past Surgical History:   Procedure Laterality Date   • ANKLE SURGERY      due to fracture   • CYST REMOVAL      from the left wrist    • HYSTERECTOMY           Current Outpatient Medications:   •  allopurinol (ZYLOPRIM) 300 MG tablet, Take 300 mg by mouth Daily., Disp: , Rfl:   •  atorvastatin (LIPITOR) 10 MG tablet, Take 10 mg by mouth Daily., Disp: , Rfl:   •  Cholecalciferol (VITAMIN D) 2000 units tablet, Take 2,000 Units by mouth Daily., Disp: , Rfl:   •  potassium chloride (K-DUR,KLOR-CON) 20 MEQ CR tablet, Take 20 mEq by mouth Daily., Disp: , Rfl:   •  triamterene-hydrochlorothiazide (MAXZIDE) 75-50 MG per tablet, Take 1 tablet by mouth Daily., Disp: , Rfl:   •  Warfarin Sodium (COUMADIN PO), Take 3.5 mg by mouth Daily., Disp: , Rfl:     Allergies   Allergen Reactions   • Codeine Shortness Of Breath and Rash       Family History   Problem Relation Age of Onset   • Breast cancer Daughter         onset in 40s   • Prostate cancer Son         onset in 60s   • Colon cancer Other        Cancer-related family history includes Breast cancer in her daughter; Colon cancer in her other; Prostate cancer in her son.    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      • Follow-up     monitoring coumadin therapy  "      Review of Systems   Constitutional: Negative for activity change, chills and fever.   HENT: Negative for ear discharge, mouth sores, nosebleeds and sore throat.    Eyes: Negative for photophobia and visual disturbance.   Respiratory: Negative for cough, choking, shortness of breath and wheezing.    Cardiovascular: Negative for chest pain and palpitations.   Gastrointestinal: Negative for abdominal pain, diarrhea, nausea and vomiting.   Genitourinary: Negative for dysuria.   Musculoskeletal: Negative for neck stiffness.        Chronic right knee pain   Skin: Negative for rash.   Neurological: Negative for seizures, syncope and speech difficulty.   Psychiatric/Behavioral: Negative for agitation, confusion and hallucinations.       Objective:    Vitals:    06/20/19 0959   BP: 136/72   Pulse: 108   Resp: 18   Temp: 98.1 °F (36.7 °C)   Weight: 98.3 kg (216 lb 12.8 oz)   Height: 167.6 cm (66\")   PainSc: 10-Worst pain ever   PainLoc: Knee  Comment: RT knee       (1) Restricted in physically strenuous activity, ambulatory and able to do work of light nature    Physical Exam   Constitutional: She is oriented to person, place, and time.   HENT:   Head: Normocephalic and atraumatic.   Eyes: Conjunctivae and EOM are normal. Right eye exhibits no discharge. No scleral icterus.   Neck: Normal range of motion. Neck supple. No thyromegaly present.   Cardiovascular: Normal rate, regular rhythm and normal heart sounds.   No murmur heard.  Pulmonary/Chest: Effort normal. No stridor. No respiratory distress. She has no wheezes.   Abdominal: Soft. Bowel sounds are normal. She exhibits no mass. There is no tenderness. There is no rebound and no guarding.   Musculoskeletal:        Right knee: Tenderness found.        Left knee: Tenderness found.   Chronic arthritic pain in bilateral knees   Lymphadenopathy:     She has no cervical adenopathy.   Neurological: She is alert and oriented to person, place, and time.   Skin: Skin is warm. " No rash noted. No erythema.   Psychiatric: She has a normal mood and affect. Her behavior is normal.   Nursing note and vitals reviewed.             Assessment/Plan   1. 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 History of pulmonary embolism and deep venous thrombosis in the past.  3. Strong family history of thrombophilia in multiple first degree relatives thrombophilia work-up was essentially negative except for positive anti-cardiolipin IgG antibody.  4. Anticardiolipin IgG antibody, slightly elevated at 24.  5.  Monitoring Coumadin therapy.  Supratherapeutic today.       Assessment has been reviewed and updated.      PLAN:   I have reviewed her INR.  She is supratherapeutic at 3.6 today.  I have asked patient to hold off warfarin for 2 days and have a repeat INR on 6/22/2019.  She is currently on 3-1/2 mg daily with most likely cut down to 3 mg daily.  She will let me know she should develop any issues prior to the next visit.  She has also been instructed to let me know if she has procedures planned in the future.   She will continue to be monitored in our Coumadin clinic.    She will follow-up with Dr. Johnston for her ongoing medical needs.  Patient has asked questions which have all been answered to the best of my abilities.          Part of this document was scribed by Sarah Tanner, RN, BSN.

## 2019-06-21 DIAGNOSIS — I27.82 OTHER CHRONIC PULMONARY EMBOLISM WITHOUT ACUTE COR PULMONALE (HCC): ICD-10-CM

## 2019-06-21 DIAGNOSIS — I82.220 IVC THROMBOSIS (HCC): Primary | ICD-10-CM

## 2019-06-22 ENCOUNTER — LAB (OUTPATIENT)
Dept: LAB | Facility: HOSPITAL | Age: 79
End: 2019-06-22

## 2019-06-22 DIAGNOSIS — I27.82 OTHER CHRONIC PULMONARY EMBOLISM WITHOUT ACUTE COR PULMONALE (HCC): ICD-10-CM

## 2019-06-22 DIAGNOSIS — I26.99 OTHER PULMONARY EMBOLISM WITHOUT ACUTE COR PULMONALE, UNSPECIFIED CHRONICITY (HCC): ICD-10-CM

## 2019-06-22 LAB
BASOPHILS # BLD AUTO: 0 10*3/MM3 (ref 0–0.2)
BASOPHILS NFR BLD AUTO: 0.5 % (ref 0–1.5)
DEPRECATED RDW RBC AUTO: 54.3 FL (ref 37–54)
EOSINOPHIL # BLD AUTO: 0.2 10*3/MM3 (ref 0–0.4)
EOSINOPHIL NFR BLD AUTO: 2.7 % (ref 0.3–6.2)
ERYTHROCYTE [DISTWIDTH] IN BLOOD BY AUTOMATED COUNT: 16.2 % (ref 12.3–15.4)
HCT VFR BLD AUTO: 43.8 % (ref 34–46.6)
HGB BLD-MCNC: 14.7 G/DL (ref 12–15.9)
INR PPP: 2.23 (ref 0.9–1.1)
LYMPHOCYTES # BLD AUTO: 2.6 10*3/MM3 (ref 0.7–3.1)
LYMPHOCYTES NFR BLD AUTO: 41.2 % (ref 19.6–45.3)
MCH RBC QN AUTO: 32.5 PG (ref 26.6–33)
MCHC RBC AUTO-ENTMCNC: 33.6 G/DL (ref 31.5–35.7)
MCV RBC AUTO: 96.7 FL (ref 79–97)
MONOCYTES # BLD AUTO: 0.5 10*3/MM3 (ref 0.1–0.9)
MONOCYTES NFR BLD AUTO: 8.4 % (ref 5–12)
NEUTROPHILS # BLD AUTO: 2.9 10*3/MM3 (ref 1.7–7)
NEUTROPHILS NFR BLD AUTO: 47.2 % (ref 42.7–76)
NRBC BLD AUTO-RTO: 0.1 /100 WBC (ref 0–0.2)
PLATELET # BLD AUTO: 335 10*3/MM3 (ref 140–450)
PMV BLD AUTO: 7.7 FL (ref 6–12)
PROTHROMBIN TIME: 21.5 SECONDS (ref 9.6–11.7)
RBC # BLD AUTO: 4.53 10*6/MM3 (ref 3.77–5.28)
WBC NRBC COR # BLD: 6.2 10*3/MM3 (ref 3.4–10.8)

## 2019-06-22 PROCEDURE — 85610 PROTHROMBIN TIME: CPT | Performed by: INTERNAL MEDICINE

## 2019-06-22 PROCEDURE — 36415 COLL VENOUS BLD VENIPUNCTURE: CPT

## 2019-06-22 PROCEDURE — 85025 COMPLETE CBC W/AUTO DIFF WBC: CPT | Performed by: INTERNAL MEDICINE

## 2019-06-24 ENCOUNTER — TELEPHONE (OUTPATIENT)
Dept: ONCOLOGY | Facility: HOSPITAL | Age: 79
End: 2019-06-24

## 2019-06-24 ENCOUNTER — ANTICOAGULATION VISIT (OUTPATIENT)
Dept: ONCOLOGY | Facility: HOSPITAL | Age: 79
End: 2019-06-24

## 2019-06-24 DIAGNOSIS — Z79.01 ENCOUNTER FOR MONITORING COUMADIN THERAPY: ICD-10-CM

## 2019-06-24 DIAGNOSIS — I27.82 OTHER CHRONIC PULMONARY EMBOLISM WITHOUT ACUTE COR PULMONALE (HCC): ICD-10-CM

## 2019-06-24 DIAGNOSIS — Z51.81 ENCOUNTER FOR MONITORING COUMADIN THERAPY: ICD-10-CM

## 2019-06-24 DIAGNOSIS — Z79.01 ENCOUNTER FOR MONITORING COUMADIN THERAPY: Primary | ICD-10-CM

## 2019-06-24 DIAGNOSIS — Z51.81 ENCOUNTER FOR MONITORING COUMADIN THERAPY: Primary | ICD-10-CM

## 2019-06-24 DIAGNOSIS — I82.220 IVC THROMBOSIS (HCC): ICD-10-CM

## 2019-06-24 RX ORDER — WARFARIN SODIUM 3 MG/1
TABLET ORAL
Qty: 30 TABLET | Refills: 3 | Status: SHIPPED | OUTPATIENT
Start: 2019-06-24 | End: 2019-11-18 | Stop reason: SDUPTHER

## 2019-06-24 RX ORDER — WARFARIN SODIUM 1 MG/1
1 TABLET ORAL DAILY
Qty: 30 TABLET | Refills: 0 | Status: CANCELLED | OUTPATIENT
Start: 2019-06-24

## 2019-06-24 RX ORDER — WARFARIN SODIUM 3 MG/1
TABLET ORAL
Qty: 30 TABLET | Refills: 3 | Status: CANCELLED | OUTPATIENT
Start: 2019-06-24

## 2019-06-24 RX ORDER — WARFARIN SODIUM 1 MG/1
TABLET ORAL
Qty: 90 TABLET | Refills: 3 | OUTPATIENT
Start: 2019-06-24

## 2019-06-24 RX ORDER — WARFARIN SODIUM 1 MG/1
1 TABLET ORAL DAILY
Qty: 30 TABLET | Refills: 0 | Status: SHIPPED | OUTPATIENT
Start: 2019-06-24 | End: 2019-06-24 | Stop reason: SDUPTHER

## 2019-06-24 RX ORDER — WARFARIN SODIUM 3 MG/1
TABLET ORAL
Qty: 30 TABLET | Refills: 3 | Status: SHIPPED | OUTPATIENT
Start: 2019-06-24 | End: 2019-06-24 | Stop reason: SDUPTHER

## 2019-06-24 RX ORDER — WARFARIN SODIUM 3 MG/1
TABLET ORAL
Qty: 90 TABLET | Refills: 0 | OUTPATIENT
Start: 2019-06-24

## 2019-06-24 NOTE — TELEPHONE ENCOUNTER
Received call from patient stating she needs to know how much Warfarin she is to be taking as she had her INR checked on Saturday 6-22-19.  She also states she needs refill on her Warfarin 1 mg and Warfarin 3 mg. Chart reviewed.  INR was 2.23 on 6-22-19.  Warfarin 1 mg and 3 mg pend and routed to Gerri Velazquez RN.  yazmin Plazaa

## 2019-06-24 NOTE — TELEPHONE ENCOUNTER
Received a call from [facility] lab stating that they had a critical lab value on pt. INR of 2.2. Pt is currently taking coumadin an reading is in range. Lab v/u of this knowing that pt is on coumadin.

## 2019-06-26 ENCOUNTER — TELEPHONE (OUTPATIENT)
Dept: ONCOLOGY | Facility: CLINIC | Age: 79
End: 2019-06-26

## 2019-07-02 ENCOUNTER — HOSPITAL ENCOUNTER (OUTPATIENT)
Dept: ONCOLOGY | Facility: HOSPITAL | Age: 79
Discharge: HOME | End: 2019-07-02
Admitting: NURSE PRACTITIONER

## 2019-07-02 ENCOUNTER — LAB (OUTPATIENT)
Dept: LAB | Facility: HOSPITAL | Age: 79
End: 2019-07-02

## 2019-07-02 VITALS
HEIGHT: 66 IN | RESPIRATION RATE: 18 BRPM | TEMPERATURE: 98.2 F | WEIGHT: 214 LBS | BODY MASS INDEX: 34.39 KG/M2 | HEART RATE: 72 BPM | DIASTOLIC BLOOD PRESSURE: 66 MMHG | SYSTOLIC BLOOD PRESSURE: 141 MMHG

## 2019-07-02 DIAGNOSIS — Z51.81 ENCOUNTER FOR MONITORING COUMADIN THERAPY: ICD-10-CM

## 2019-07-02 DIAGNOSIS — Z79.01 ENCOUNTER FOR MONITORING COUMADIN THERAPY: ICD-10-CM

## 2019-07-02 DIAGNOSIS — I26.99 OTHER PULMONARY EMBOLISM WITHOUT ACUTE COR PULMONALE, UNSPECIFIED CHRONICITY (HCC): Primary | ICD-10-CM

## 2019-07-02 LAB
INR PPP: 2.4
PROTHROMBIN TIME: 29.4 SECONDS (ref 11–15)

## 2019-07-02 PROCEDURE — 85610 PROTHROMBIN TIME: CPT

## 2019-07-30 DIAGNOSIS — I27.82 OTHER CHRONIC PULMONARY EMBOLISM WITHOUT ACUTE COR PULMONALE (HCC): ICD-10-CM

## 2019-07-30 DIAGNOSIS — I82.220 IVC THROMBOSIS (HCC): Primary | ICD-10-CM

## 2019-08-01 ENCOUNTER — HOSPITAL ENCOUNTER (OUTPATIENT)
Dept: ONCOLOGY | Facility: HOSPITAL | Age: 79
Discharge: HOME | End: 2019-08-01
Admitting: INTERNAL MEDICINE

## 2019-08-01 ENCOUNTER — LAB (OUTPATIENT)
Dept: LAB | Facility: HOSPITAL | Age: 79
End: 2019-08-01

## 2019-08-01 VITALS
BODY MASS INDEX: 34.87 KG/M2 | WEIGHT: 217 LBS | TEMPERATURE: 98.1 F | RESPIRATION RATE: 18 BRPM | HEIGHT: 66 IN | SYSTOLIC BLOOD PRESSURE: 140 MMHG | HEART RATE: 83 BPM | DIASTOLIC BLOOD PRESSURE: 62 MMHG

## 2019-08-01 DIAGNOSIS — I82.220 IVC THROMBOSIS (HCC): ICD-10-CM

## 2019-08-01 DIAGNOSIS — I27.82 OTHER CHRONIC PULMONARY EMBOLISM WITHOUT ACUTE COR PULMONALE (HCC): ICD-10-CM

## 2019-08-01 DIAGNOSIS — I27.82 OTHER CHRONIC PULMONARY EMBOLISM WITHOUT ACUTE COR PULMONALE (HCC): Primary | ICD-10-CM

## 2019-08-01 LAB
INR PPP: 2.8
PROTHROMBIN TIME: 32.2 SECONDS (ref 11–15)

## 2019-08-01 PROCEDURE — 85610 PROTHROMBIN TIME: CPT

## 2019-08-16 RX ORDER — ATORVASTATIN CALCIUM 10 MG/1
TABLET, FILM COATED ORAL
Qty: 90 TABLET | Refills: 0 | Status: SHIPPED | OUTPATIENT
Start: 2019-08-16 | End: 2019-11-18 | Stop reason: SDUPTHER

## 2019-08-30 RX ORDER — ALLOPURINOL 300 MG/1
TABLET ORAL
Qty: 90 TABLET | Refills: 1 | Status: SHIPPED | OUTPATIENT
Start: 2019-08-30 | End: 2020-03-01

## 2019-08-30 RX ORDER — TRIAMTERENE AND HYDROCHLOROTHIAZIDE 75; 50 MG/1; MG/1
TABLET ORAL
Qty: 90 TABLET | Refills: 1 | Status: SHIPPED | OUTPATIENT
Start: 2019-08-30 | End: 2020-03-01

## 2019-09-05 ENCOUNTER — LAB (OUTPATIENT)
Dept: LAB | Facility: HOSPITAL | Age: 79
End: 2019-09-05

## 2019-09-05 ENCOUNTER — HOSPITAL ENCOUNTER (OUTPATIENT)
Dept: ONCOLOGY | Facility: HOSPITAL | Age: 79
Discharge: HOME | End: 2019-09-05
Admitting: INTERNAL MEDICINE

## 2019-09-05 VITALS
HEIGHT: 66 IN | TEMPERATURE: 99 F | HEART RATE: 108 BPM | BODY MASS INDEX: 34.39 KG/M2 | RESPIRATION RATE: 18 BRPM | DIASTOLIC BLOOD PRESSURE: 62 MMHG | SYSTOLIC BLOOD PRESSURE: 134 MMHG | WEIGHT: 214 LBS

## 2019-09-05 DIAGNOSIS — I82.220 IVC THROMBOSIS (HCC): Primary | ICD-10-CM

## 2019-09-05 DIAGNOSIS — I27.82 OTHER CHRONIC PULMONARY EMBOLISM WITHOUT ACUTE COR PULMONALE (HCC): ICD-10-CM

## 2019-09-05 DIAGNOSIS — I82.220 IVC THROMBOSIS (HCC): ICD-10-CM

## 2019-09-05 LAB — INR PPP: 1.9

## 2019-09-05 PROCEDURE — 85610 PROTHROMBIN TIME: CPT

## 2019-09-09 RX ORDER — POTASSIUM CHLORIDE 20 MEQ/1
TABLET, EXTENDED RELEASE ORAL
Qty: 90 TABLET | Refills: 1 | Status: SHIPPED | OUTPATIENT
Start: 2019-09-09 | End: 2020-03-17

## 2019-09-12 ENCOUNTER — LAB (OUTPATIENT)
Dept: LAB | Facility: HOSPITAL | Age: 79
End: 2019-09-12

## 2019-09-12 ENCOUNTER — HOSPITAL ENCOUNTER (OUTPATIENT)
Dept: ONCOLOGY | Facility: HOSPITAL | Age: 79
Discharge: HOME | End: 2019-09-12
Admitting: INTERNAL MEDICINE

## 2019-09-12 VITALS
WEIGHT: 213 LBS | TEMPERATURE: 98.6 F | HEIGHT: 66 IN | HEART RATE: 108 BPM | SYSTOLIC BLOOD PRESSURE: 159 MMHG | RESPIRATION RATE: 18 BRPM | BODY MASS INDEX: 34.23 KG/M2 | DIASTOLIC BLOOD PRESSURE: 73 MMHG

## 2019-09-12 DIAGNOSIS — I27.82 OTHER CHRONIC PULMONARY EMBOLISM WITHOUT ACUTE COR PULMONALE (HCC): Primary | ICD-10-CM

## 2019-09-12 DIAGNOSIS — I27.82 OTHER CHRONIC PULMONARY EMBOLISM WITHOUT ACUTE COR PULMONALE (HCC): ICD-10-CM

## 2019-09-12 DIAGNOSIS — I82.220 IVC THROMBOSIS (HCC): ICD-10-CM

## 2019-09-12 LAB — INR PPP: 2.3

## 2019-09-12 PROCEDURE — 85610 PROTHROMBIN TIME: CPT

## 2019-09-19 ENCOUNTER — HOSPITAL ENCOUNTER (OUTPATIENT)
Dept: ONCOLOGY | Facility: HOSPITAL | Age: 79
Discharge: HOME | End: 2019-09-19
Admitting: INTERNAL MEDICINE

## 2019-09-19 ENCOUNTER — OFFICE VISIT (OUTPATIENT)
Dept: ONCOLOGY | Facility: CLINIC | Age: 79
End: 2019-09-19

## 2019-09-19 ENCOUNTER — LAB (OUTPATIENT)
Dept: LAB | Facility: HOSPITAL | Age: 79
End: 2019-09-19

## 2019-09-19 VITALS
HEART RATE: 72 BPM | WEIGHT: 214 LBS | TEMPERATURE: 98.4 F | DIASTOLIC BLOOD PRESSURE: 70 MMHG | SYSTOLIC BLOOD PRESSURE: 140 MMHG | HEIGHT: 66 IN | RESPIRATION RATE: 18 BRPM | BODY MASS INDEX: 34.39 KG/M2

## 2019-09-19 VITALS
WEIGHT: 214 LBS | TEMPERATURE: 98.4 F | SYSTOLIC BLOOD PRESSURE: 140 MMHG | HEART RATE: 72 BPM | RESPIRATION RATE: 18 BRPM | DIASTOLIC BLOOD PRESSURE: 70 MMHG | HEIGHT: 66 IN | BODY MASS INDEX: 34.39 KG/M2

## 2019-09-19 DIAGNOSIS — I82.220 IVC THROMBOSIS (HCC): ICD-10-CM

## 2019-09-19 DIAGNOSIS — I27.82 OTHER CHRONIC PULMONARY EMBOLISM WITHOUT ACUTE COR PULMONALE (HCC): ICD-10-CM

## 2019-09-19 DIAGNOSIS — I27.82 OTHER CHRONIC PULMONARY EMBOLISM WITHOUT ACUTE COR PULMONALE (HCC): Primary | ICD-10-CM

## 2019-09-19 LAB
ALBUMIN SERPL-MCNC: 3.2 G/DL (ref 3.5–4.8)
ALBUMIN/GLOB SERPL: 0.9 G/DL (ref 1–1.7)
ALP SERPL-CCNC: 123 U/L (ref 32–91)
ALT SERPL W P-5'-P-CCNC: 21 U/L (ref 14–54)
ANION GAP SERPL CALCULATED.3IONS-SCNC: 13.4 MMOL/L (ref 5–15)
AST SERPL-CCNC: 25 U/L (ref 15–41)
BASOPHILS # BLD AUTO: 0.02 10*3/MM3 (ref 0–0.2)
BASOPHILS NFR BLD AUTO: 0.3 % (ref 0–1.5)
BILIRUB SERPL-MCNC: 0.7 MG/DL (ref 0.3–1.2)
BUN BLD-MCNC: 7 MG/DL (ref 8–20)
BUN/CREAT SERPL: 10 (ref 5.4–26.2)
CALCIUM SPEC-SCNC: 9.2 MG/DL (ref 8.9–10.3)
CHLORIDE SERPL-SCNC: 101 MMOL/L (ref 101–111)
CO2 SERPL-SCNC: 28 MMOL/L (ref 22–32)
CREAT BLD-MCNC: 0.7 MG/DL (ref 0.4–1)
DEPRECATED RDW RBC AUTO: 46.6 FL (ref 37–54)
EOSINOPHIL # BLD AUTO: 0.09 10*3/MM3 (ref 0–0.4)
EOSINOPHIL NFR BLD AUTO: 1.3 % (ref 0.3–6.2)
ERYTHROCYTE [DISTWIDTH] IN BLOOD BY AUTOMATED COUNT: 14 % (ref 12.3–15.4)
GFR SERPL CREATININE-BSD FRML MDRD: 98 ML/MIN/1.73
GLOBULIN UR ELPH-MCNC: 3.7 GM/DL (ref 2.5–3.8)
GLUCOSE BLD-MCNC: 93 MG/DL (ref 65–99)
HCT VFR BLD AUTO: 42.3 % (ref 34–46.6)
HGB BLD-MCNC: 14.2 G/DL (ref 12–15.9)
INR PPP: 2.2
LYMPHOCYTES # BLD AUTO: 2.28 10*3/MM3 (ref 0.7–3.1)
LYMPHOCYTES NFR BLD AUTO: 32.5 % (ref 19.6–45.3)
MCH RBC QN AUTO: 31.3 PG (ref 26.6–33)
MCHC RBC AUTO-ENTMCNC: 33.6 G/DL (ref 31.5–35.7)
MCV RBC AUTO: 93.4 FL (ref 79–97)
MONOCYTES # BLD AUTO: 0.72 10*3/MM3 (ref 0.1–0.9)
MONOCYTES NFR BLD AUTO: 10.3 % (ref 5–12)
NEUTROPHILS # BLD AUTO: 3.9 10*3/MM3 (ref 1.7–7)
NEUTROPHILS NFR BLD AUTO: 55.6 % (ref 42.7–76)
PLATELET # BLD AUTO: 312 10*3/MM3 (ref 140–450)
PMV BLD AUTO: 9 FL (ref 6–12)
POTASSIUM BLD-SCNC: 3.4 MMOL/L (ref 3.6–5.1)
PROT SERPL-MCNC: 6.9 G/DL (ref 6.1–7.9)
RBC # BLD AUTO: 4.53 10*6/MM3 (ref 3.77–5.28)
SODIUM BLD-SCNC: 139 MMOL/L (ref 136–144)
WBC NRBC COR # BLD: 7.01 10*3/MM3 (ref 3.4–10.8)

## 2019-09-19 PROCEDURE — 85025 COMPLETE CBC W/AUTO DIFF WBC: CPT | Performed by: INTERNAL MEDICINE

## 2019-09-19 PROCEDURE — 99214 OFFICE O/P EST MOD 30 MIN: CPT | Performed by: INTERNAL MEDICINE

## 2019-09-19 PROCEDURE — 85610 PROTHROMBIN TIME: CPT

## 2019-09-19 PROCEDURE — 80053 COMPREHEN METABOLIC PANEL: CPT | Performed by: INTERNAL MEDICINE

## 2019-09-19 NOTE — PROGRESS NOTES
Hematology/Oncology Outpatient Follow Up    PATIENT NAME:Adelso Lynne  :1940  MRN: [id number]  PRIMARY CARE PHYSICIAN: Lyell, Reggie Duane, MD  REFERRING PHYSICIAN: Lyell, Reggie Duane, MD    Chief Complaint   Patient presents with   • Follow-up     Thrombophilia   • Follow-up     Long term anticoagulation therapy        HISTORY OF PRESENT ILLNESS:   This is a 78-year-old female who has a history of pulmonary embolism at the age of 25 [more than 50 years ago].  Subsequently she developed deep venous thrombosis and patient was treated with Warfarin therapy at the time.  She has a strong family  history of blood clots in her mother, brother, son and sister, but there is no known mutation yet identified in the family.  Patient was seen by Amalia Salamanca with [facility] and had an MRI of the abdomen to evaluate elevated liver function tests.  The liver showed evidence of fatty infiltration, but there was no mass identified.  The kidneys were normal.  The spleen did not show any abnormalities, as well as the pancreas.  There was a filling defect noted in the inferior vena cava below the renal veins, thought to represent some partial thrombosis.  The patient was then placed on Lovenox 40 mg subcutaneously daily and then referred for further evaluation and management of her thrombophilia.  Patient denies any new symptoms such as weight loss, night sweats or fatigue.  She is able to carry out her own activities of daily living.  Her last colonoscopy was about three to four years ago and she is up to date on her routine mammograms.  Patient is accompanied by her daughter today for this appointment.      • Since her last visit on 16, patient was initiated on anticoagulation with Warfarin.  Anticardiolipin IgG antibody was slightly high at 24 [normal < 23], beta-2 glycoprotein was negative.  Factor V Leiden was not mutated.  Prothrombin gene was not mutated as well.  PT 12.4, INR 1, PTT 27.  Protein C activity (N) 129.   Antithrombin III activity (N) 99.  Protein S activity (N) 85%.          Past Medical History:   Diagnosis Date   • Arthritis    • Deep venous thrombosis (CMS/HCC)    • Hyperlipidemia    • Hypertension    • Pulmonary embolism (CMS/HCC)        Past Surgical History:   Procedure Laterality Date   • ANKLE SURGERY      due to fracture   • CYST REMOVAL      from the left wrist    • HYSTERECTOMY           Current Outpatient Medications:   •  allopurinol (ZYLOPRIM) 300 MG tablet, TAKE 1 TABLET BY MOUTH ONCE DAILY, Disp: 90 tablet, Rfl: 1  •  atorvastatin (LIPITOR) 10 MG tablet, TAKE 1 TABLET BY MOUTH IN THE EVENING, Disp: 90 tablet, Rfl: 0  •  Cholecalciferol (VITAMIN D) 2000 units tablet, Take 2,000 Units by mouth Daily., Disp: , Rfl:   •  potassium chloride (K-DUR,KLOR-CON) 20 MEQ CR tablet, TAKE 1 TABLET BY MOUTH ONCE DAILY, Disp: 90 tablet, Rfl: 1  •  triamterene-hydrochlorothiazide (MAXZIDE) 75-50 MG per tablet, TAKE 1 TABLET BY MOUTH ONCE DAILY, Disp: 90 tablet, Rfl: 1  •  warfarin (COUMADIN) 3 MG tablet, Coumadin 3mg po daily, Disp: 30 tablet, Rfl: 3    Allergies   Allergen Reactions   • Codeine Shortness Of Breath, Rash and Unknown (See Comments)   • Hydroxychloroquine Unknown (See Comments)     Light flashes spider web in right eye and black dot in left eye       Family History   Problem Relation Age of Onset   • Breast cancer Daughter         onset in 40s   • Prostate cancer Son         onset in 60s   • Colon cancer Other        Cancer-related family history includes Breast cancer in her daughter; Colon cancer in her other; Prostate cancer in her son.    Social History     Tobacco Use   • Smoking status: Never Smoker   Substance Use Topics   • Alcohol use: Yes     Frequency: Never   • Drug use: Not on file       I have reviewed the history of present illness, past medical history, family history, social history, lab results, all notes and other records since the patient was last seen on  "6/26/2019.    SUBJECTIVE:  Patient is here today for routine follow-up.  She is planning to have dental procedures in the near future.  Her dental surgeon is at the [facility].  Patient will let me know once this is scheduled for Lovenox bridge.  She denies any bleeding issues.  She denies nausea or vomiting.  She is followed very closely in the Coumadin clinic.          REVIEW OF SYSTEMS:  Review of Systems   Constitutional: Negative for chills and fever.   HENT: Negative for ear pain, mouth sores, nosebleeds and sore throat.    Eyes: Negative for photophobia and visual disturbance.   Respiratory: Negative for wheezing and stridor.    Cardiovascular: Negative for chest pain and palpitations.   Gastrointestinal: Negative for abdominal pain, diarrhea, nausea and vomiting.   Endocrine: Negative for cold intolerance and heat intolerance.   Genitourinary: Negative for dysuria and hematuria.   Musculoskeletal: Negative for joint swelling and neck stiffness.   Skin: Negative for color change and rash.   Neurological: Negative for seizures and syncope.   Hematological: Negative for adenopathy.        No obvious bleeding   Psychiatric/Behavioral: Negative for agitation, confusion and hallucinations.       OBJECTIVE:    Vitals:    09/19/19 0919   BP: 140/70   Pulse: 72   Resp: 18   Temp: 98.4 °F (36.9 °C)   Weight: 97.1 kg (214 lb)   Height: 167.6 cm (66\")   PainSc: 0-No pain       ECOG  (1) Restricted in physically strenuous activity, ambulatory and able to do work of light nature    Physical Exam   Constitutional: She is oriented to person, place, and time. No distress.   HENT:   Head: Normocephalic and atraumatic.   Eyes: Conjunctivae and EOM are normal. Right eye exhibits no discharge. Left eye exhibits no discharge. No scleral icterus.   Neck: Normal range of motion. Neck supple. No thyromegaly present.   Cardiovascular: Normal rate, regular rhythm and normal heart sounds. Exam reveals no gallop " and no friction rub.   Pulmonary/Chest: Effort normal. No stridor. No respiratory distress. She has no wheezes.   Abdominal: Soft. Bowel sounds are normal. She exhibits no mass. There is no tenderness. There is no rebound and no guarding.   Musculoskeletal: Normal range of motion. She exhibits no tenderness.   Lymphadenopathy:     She has no cervical adenopathy.   Neurological: She is alert and oriented to person, place, and time. She exhibits normal muscle tone.   Skin: Skin is warm. No rash noted. She is not diaphoretic. No erythema.   Psychiatric: She has a normal mood and affect. Her behavior is normal.   Nursing note and vitals reviewed.      RECENT LABS  WBC   Date Value Ref Range Status   09/19/2019 7.01 3.40 - 10.80 10*3/mm3 Final     RBC   Date Value Ref Range Status   09/19/2019 4.53 3.77 - 5.28 10*6/mm3 Final     Hemoglobin   Date Value Ref Range Status   09/19/2019 14.2 12.0 - 15.9 g/dL Final     Hematocrit   Date Value Ref Range Status   09/19/2019 42.3 34.0 - 46.6 % Final     MCV   Date Value Ref Range Status   09/19/2019 93.4 79.0 - 97.0 fL Final     MCH   Date Value Ref Range Status   09/19/2019 31.3 26.6 - 33.0 pg Final     MCHC   Date Value Ref Range Status   09/19/2019 33.6 31.5 - 35.7 g/dL Final     RDW   Date Value Ref Range Status   09/19/2019 14.0 12.3 - 15.4 % Final     RDW-SD   Date Value Ref Range Status   09/19/2019 46.6 37.0 - 54.0 fl Final     MPV   Date Value Ref Range Status   09/19/2019 9.0 6.0 - 12.0 fL Final     Platelets   Date Value Ref Range Status   09/19/2019 312 140 - 450 10*3/mm3 Final     Neutrophil %   Date Value Ref Range Status   09/19/2019 55.6 42.7 - 76.0 % Final     Lymphocyte %   Date Value Ref Range Status   09/19/2019 32.5 19.6 - 45.3 % Final     Monocyte %   Date Value Ref Range Status   09/19/2019 10.3 5.0 - 12.0 % Final     Eosinophil %   Date Value Ref Range Status   09/19/2019 1.3 0.3 - 6.2 % Final     Basophil %   Date Value Ref Range Status   09/19/2019 0.3  0.0 - 1.5 % Final     Neutrophils, Absolute   Date Value Ref Range Status   09/19/2019 3.90 1.70 - 7.00 10*3/mm3 Final     Lymphocytes, Absolute   Date Value Ref Range Status   09/19/2019 2.28 0.70 - 3.10 10*3/mm3 Final     Monocytes, Absolute   Date Value Ref Range Status   09/19/2019 0.72 0.10 - 0.90 10*3/mm3 Final     Eosinophils, Absolute   Date Value Ref Range Status   09/19/2019 0.09 0.00 - 0.40 10*3/mm3 Final     Basophils, Absolute   Date Value Ref Range Status   09/19/2019 0.02 0.00 - 0.20 10*3/mm3 Final     nRBC   Date Value Ref Range Status   06/22/2019 0.1 0.0 - 0.2 /100 WBC Final       Lab Results   Component Value Date    GLUCOSE 93 02/21/2019    BUN 5 (L) 02/21/2019    CREATININE 0.7 02/21/2019    EGFRIFNONA 90 11/17/2017    EGFRIFAFRI 78 11/17/2017    BCR 7.1 02/21/2019    K 3.4 (L) 02/21/2019    CO2 27 02/21/2019    CALCIUM 9.4 02/21/2019    ALBUMIN 3.2 (L) 02/21/2019    LABIL2 0.8 (L) 02/21/2019    AST 26 02/21/2019    ALT 21 02/21/2019         Assessment/Plan     Other chronic pulmonary embolism without acute cor pulmonale (CMS/HCC)  - CBC & Differential  - Comprehensive Metabolic Panel  - CBC Auto Differential      ASSESSMENT:    1. 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   2. History of pulmonary embolism and deep venous thrombosis in the past.  3. Strong family history of thrombophilia in multiple first degree relatives.   4. Anticardiolipin IgG antibody, slightly elevated at 24.   5. Monitoring Coumadin therapy.  Supratherapeutic today.     PLAN:  I have reviewed her INR.  She is therapeutic.  Patient will let me know when her Dental surgery is scheduled for her Lovenox bridge. She will let me know she should develop any issues prior to the next visit.    She will let me know if she develops any bleeding complications.  She will continue to be monitored in our Coumadin clinic.     She will follow-up with Dr. Johnston for her ongoing medical needs.  Patient has asked questions which have all  been answered to the best of my abilities.                 I have reviewed labs results, imaging, vitals, and medications with the patient today. Will follow up in 4 months with me.      Patient verbalized understanding and is in agreement of the above plan.    Part of this document was scribed by Sarah Tanner RN, BSN.        Much of the above report is an electronic transcription/translation of the spoken language to printed text using Dragon Software. As such, the subtleties and finesse of the spoken language may permit erroneous, or at times, nonsensical words or phrases to be inadvertently transcribed; thus changes may be made at a later date to rectify these errors.

## 2019-09-20 ENCOUNTER — TELEPHONE (OUTPATIENT)
Dept: ONCOLOGY | Facility: CLINIC | Age: 79
End: 2019-09-20

## 2019-09-20 NOTE — TELEPHONE ENCOUNTER
----- Message from PERFECTO Eddy sent at 9/19/2019 11:47 AM EDT -----  Please let the patient know that her potassium was mildly low and ensure that she is currently taking her potassium supplement as it is listed on the medication sheet.  She should follow-up with her primary care provider Dr. Cooper to determine if she needs any adjustment to her supplementation.  Thank you.

## 2019-09-20 NOTE — TELEPHONE ENCOUNTER
I called pt to let her know that her K+ level is running slightly low. I asked her if she has been taking her KCL daily. She states she has. I let her know that NP would like her to f/u with Dr. Cooper so he can determine if she needs her KCL dose adjusted. She showed v/u.

## 2019-09-24 ENCOUNTER — TELEPHONE (OUTPATIENT)
Dept: ONCOLOGY | Facility: CLINIC | Age: 79
End: 2019-09-24

## 2019-09-24 NOTE — TELEPHONE ENCOUNTER
Patient called stating she has an appt with [facility] tomorrow.  She states they are wanting to extract all of her teeth.  She states she is on 3 mg Warfarin and will probably need to be put on Lovenox bridge.  Called and spoke with [facility].  She states they have an INR machine at their facility and the do not take patient's off of Warfarin unless absolutely necessary.  Patient notified and v/eugenio Zheng

## 2019-10-17 ENCOUNTER — HOSPITAL ENCOUNTER (OUTPATIENT)
Dept: ONCOLOGY | Facility: HOSPITAL | Age: 79
Discharge: HOME | End: 2019-10-17
Admitting: INTERNAL MEDICINE

## 2019-10-17 ENCOUNTER — LAB (OUTPATIENT)
Dept: LAB | Facility: HOSPITAL | Age: 79
End: 2019-10-17

## 2019-10-17 VITALS
HEART RATE: 78 BPM | RESPIRATION RATE: 18 BRPM | TEMPERATURE: 97.6 F | HEIGHT: 66 IN | WEIGHT: 213 LBS | DIASTOLIC BLOOD PRESSURE: 51 MMHG | SYSTOLIC BLOOD PRESSURE: 147 MMHG | BODY MASS INDEX: 34.23 KG/M2

## 2019-10-17 DIAGNOSIS — I27.82 OTHER CHRONIC PULMONARY EMBOLISM WITHOUT ACUTE COR PULMONALE (HCC): Primary | ICD-10-CM

## 2019-10-17 DIAGNOSIS — I82.220 IVC THROMBOSIS (HCC): ICD-10-CM

## 2019-10-17 DIAGNOSIS — I27.82 OTHER CHRONIC PULMONARY EMBOLISM WITHOUT ACUTE COR PULMONALE (HCC): ICD-10-CM

## 2019-10-17 LAB
INR PPP: 2.9
INR PPP: 2.9 (ref 2–3)

## 2019-10-17 PROCEDURE — 85610 PROTHROMBIN TIME: CPT

## 2019-10-17 PROCEDURE — 36416 COLLJ CAPILLARY BLOOD SPEC: CPT

## 2019-11-14 ENCOUNTER — HOSPITAL ENCOUNTER (OUTPATIENT)
Dept: ONCOLOGY | Facility: HOSPITAL | Age: 79
Discharge: HOME | End: 2019-11-14
Admitting: INTERNAL MEDICINE

## 2019-11-14 ENCOUNTER — LAB (OUTPATIENT)
Dept: LAB | Facility: HOSPITAL | Age: 79
End: 2019-11-14

## 2019-11-14 VITALS — TEMPERATURE: 98.1 F | WEIGHT: 214 LBS | HEIGHT: 66 IN | BODY MASS INDEX: 34.39 KG/M2 | RESPIRATION RATE: 18 BRPM

## 2019-11-14 DIAGNOSIS — I82.220 IVC THROMBOSIS (HCC): ICD-10-CM

## 2019-11-14 DIAGNOSIS — I27.82 OTHER CHRONIC PULMONARY EMBOLISM WITHOUT ACUTE COR PULMONALE (HCC): ICD-10-CM

## 2019-11-14 DIAGNOSIS — I27.82 OTHER CHRONIC PULMONARY EMBOLISM WITHOUT ACUTE COR PULMONALE (HCC): Primary | ICD-10-CM

## 2019-11-14 LAB
INR PPP: 1.9
INR PPP: 1.9 (ref 2–3)

## 2019-11-14 PROCEDURE — 85610 PROTHROMBIN TIME: CPT

## 2019-11-14 PROCEDURE — 36416 COLLJ CAPILLARY BLOOD SPEC: CPT

## 2019-11-16 DIAGNOSIS — Z51.81 ENCOUNTER FOR MONITORING COUMADIN THERAPY: ICD-10-CM

## 2019-11-16 DIAGNOSIS — Z79.01 ENCOUNTER FOR MONITORING COUMADIN THERAPY: ICD-10-CM

## 2019-11-17 RX ORDER — ATORVASTATIN CALCIUM 10 MG/1
TABLET, FILM COATED ORAL
Qty: 90 TABLET | Refills: 0 | OUTPATIENT
Start: 2019-11-17

## 2019-11-18 RX ORDER — WARFARIN SODIUM 3 MG/1
TABLET ORAL
Qty: 30 TABLET | Refills: 3 | Status: SHIPPED | OUTPATIENT
Start: 2019-11-18 | End: 2020-04-09 | Stop reason: SDUPTHER

## 2019-11-18 RX ORDER — ATORVASTATIN CALCIUM 10 MG/1
10 TABLET, FILM COATED ORAL EVERY EVENING
Qty: 90 TABLET | Refills: 0 | Status: SHIPPED | OUTPATIENT
Start: 2019-11-18 | End: 2020-01-28

## 2019-11-18 NOTE — TELEPHONE ENCOUNTER
Patient notified that she would need to be seen in order to get further refills. She is scheduled for 12/23/2019 at 11am. She will need enough to get her through that appt.

## 2019-11-21 ENCOUNTER — LAB (OUTPATIENT)
Dept: LAB | Facility: HOSPITAL | Age: 79
End: 2019-11-21

## 2019-11-21 ENCOUNTER — HOSPITAL ENCOUNTER (OUTPATIENT)
Dept: ONCOLOGY | Facility: HOSPITAL | Age: 79
Discharge: HOME | End: 2019-11-21
Admitting: INTERNAL MEDICINE

## 2019-11-21 VITALS
WEIGHT: 214 LBS | SYSTOLIC BLOOD PRESSURE: 147 MMHG | RESPIRATION RATE: 18 BRPM | BODY MASS INDEX: 34.39 KG/M2 | HEIGHT: 66 IN | TEMPERATURE: 98.6 F | DIASTOLIC BLOOD PRESSURE: 76 MMHG | HEART RATE: 89 BPM

## 2019-11-21 DIAGNOSIS — I82.220 IVC THROMBOSIS (HCC): ICD-10-CM

## 2019-11-21 DIAGNOSIS — I82.220 IVC THROMBOSIS (HCC): Primary | ICD-10-CM

## 2019-11-21 DIAGNOSIS — I27.82 OTHER CHRONIC PULMONARY EMBOLISM WITHOUT ACUTE COR PULMONALE (HCC): ICD-10-CM

## 2019-11-21 LAB — INR PPP: 1.8

## 2019-11-21 PROCEDURE — 85610 PROTHROMBIN TIME: CPT

## 2019-11-21 RX ORDER — WARFARIN SODIUM 1 MG/1
TABLET ORAL
Qty: 90 TABLET | Refills: 3 | Status: SHIPPED | OUTPATIENT
Start: 2019-11-21 | End: 2020-01-27

## 2019-11-21 NOTE — TELEPHONE ENCOUNTER
Pt called and states that she needs 1mg coumadin sent over to be able to take her nose dose that she was given today. I sent that to her [facility] pharmacy.

## 2019-11-26 ENCOUNTER — LAB (OUTPATIENT)
Dept: LAB | Facility: HOSPITAL | Age: 79
End: 2019-11-26

## 2019-11-26 ENCOUNTER — HOSPITAL ENCOUNTER (OUTPATIENT)
Dept: ONCOLOGY | Facility: HOSPITAL | Age: 79
Discharge: HOME | End: 2019-11-26
Admitting: INTERNAL MEDICINE

## 2019-11-26 VITALS
HEIGHT: 66 IN | TEMPERATURE: 97.9 F | SYSTOLIC BLOOD PRESSURE: 114 MMHG | WEIGHT: 214 LBS | DIASTOLIC BLOOD PRESSURE: 75 MMHG | HEART RATE: 110 BPM | RESPIRATION RATE: 18 BRPM | BODY MASS INDEX: 34.39 KG/M2

## 2019-11-26 DIAGNOSIS — I27.82 OTHER CHRONIC PULMONARY EMBOLISM WITHOUT ACUTE COR PULMONALE (HCC): ICD-10-CM

## 2019-11-26 DIAGNOSIS — I82.220 IVC THROMBOSIS (HCC): ICD-10-CM

## 2019-11-26 DIAGNOSIS — I27.82 OTHER CHRONIC PULMONARY EMBOLISM WITHOUT ACUTE COR PULMONALE (HCC): Primary | ICD-10-CM

## 2019-11-26 LAB — INR PPP: 1.9

## 2019-11-26 PROCEDURE — 85610 PROTHROMBIN TIME: CPT

## 2019-11-26 NOTE — ADDENDUM NOTE
Encounter addended by: Gerri Velazquez RN on: 11/26/2019 10:44 AM   Actions taken: Anticoagulation related activity accessed

## 2019-12-05 ENCOUNTER — HOSPITAL ENCOUNTER (OUTPATIENT)
Dept: ONCOLOGY | Facility: HOSPITAL | Age: 79
Discharge: HOME | End: 2019-12-05
Admitting: INTERNAL MEDICINE

## 2019-12-05 ENCOUNTER — LAB (OUTPATIENT)
Dept: LAB | Facility: HOSPITAL | Age: 79
End: 2019-12-05

## 2019-12-05 VITALS
DIASTOLIC BLOOD PRESSURE: 79 MMHG | SYSTOLIC BLOOD PRESSURE: 176 MMHG | RESPIRATION RATE: 18 BRPM | BODY MASS INDEX: 34.39 KG/M2 | TEMPERATURE: 97.9 F | WEIGHT: 214 LBS | HEART RATE: 96 BPM | HEIGHT: 66 IN

## 2019-12-05 DIAGNOSIS — I27.82 OTHER CHRONIC PULMONARY EMBOLISM WITHOUT ACUTE COR PULMONALE (HCC): Primary | ICD-10-CM

## 2019-12-05 DIAGNOSIS — I27.82 OTHER CHRONIC PULMONARY EMBOLISM WITHOUT ACUTE COR PULMONALE (HCC): ICD-10-CM

## 2019-12-05 DIAGNOSIS — I82.220 IVC THROMBOSIS (HCC): ICD-10-CM

## 2019-12-05 LAB — INR PPP: 2.2

## 2019-12-05 PROCEDURE — 85610 PROTHROMBIN TIME: CPT

## 2019-12-12 ENCOUNTER — LAB (OUTPATIENT)
Dept: LAB | Facility: HOSPITAL | Age: 79
End: 2019-12-12

## 2019-12-12 ENCOUNTER — HOSPITAL ENCOUNTER (OUTPATIENT)
Dept: ONCOLOGY | Facility: HOSPITAL | Age: 79
Discharge: HOME | End: 2019-12-12
Admitting: INTERNAL MEDICINE

## 2019-12-12 VITALS
RESPIRATION RATE: 18 BRPM | HEART RATE: 90 BPM | WEIGHT: 212.6 LBS | HEIGHT: 66 IN | BODY MASS INDEX: 34.17 KG/M2 | SYSTOLIC BLOOD PRESSURE: 159 MMHG | TEMPERATURE: 98.2 F | DIASTOLIC BLOOD PRESSURE: 75 MMHG

## 2019-12-12 DIAGNOSIS — I27.82 OTHER CHRONIC PULMONARY EMBOLISM WITHOUT ACUTE COR PULMONALE (HCC): ICD-10-CM

## 2019-12-12 DIAGNOSIS — I27.82 OTHER CHRONIC PULMONARY EMBOLISM WITHOUT ACUTE COR PULMONALE (HCC): Primary | ICD-10-CM

## 2019-12-12 DIAGNOSIS — I82.220 IVC THROMBOSIS (HCC): ICD-10-CM

## 2019-12-12 LAB — INR PPP: 2

## 2019-12-12 PROCEDURE — 85610 PROTHROMBIN TIME: CPT

## 2019-12-23 ENCOUNTER — OFFICE VISIT (OUTPATIENT)
Dept: FAMILY MEDICINE CLINIC | Facility: CLINIC | Age: 79
End: 2019-12-23

## 2019-12-23 VITALS
DIASTOLIC BLOOD PRESSURE: 79 MMHG | HEIGHT: 65 IN | BODY MASS INDEX: 34.49 KG/M2 | WEIGHT: 207 LBS | HEART RATE: 119 BPM | OXYGEN SATURATION: 94 % | RESPIRATION RATE: 18 BRPM | TEMPERATURE: 98.1 F | SYSTOLIC BLOOD PRESSURE: 129 MMHG

## 2019-12-23 DIAGNOSIS — I10 ESSENTIAL HYPERTENSION: ICD-10-CM

## 2019-12-23 DIAGNOSIS — M1A.09X0 IDIOPATHIC CHRONIC GOUT OF MULTIPLE SITES WITHOUT TOPHUS: ICD-10-CM

## 2019-12-23 DIAGNOSIS — E78.01 FAMILIAL HYPERCHOLESTEROLEMIA: ICD-10-CM

## 2019-12-23 DIAGNOSIS — Z91.81 AT MODERATE RISK FOR FALL: ICD-10-CM

## 2019-12-23 DIAGNOSIS — Z00.00 MEDICARE ANNUAL WELLNESS VISIT, SUBSEQUENT: Primary | ICD-10-CM

## 2019-12-23 DIAGNOSIS — I27.82 OTHER CHRONIC PULMONARY EMBOLISM WITHOUT ACUTE COR PULMONALE (HCC): ICD-10-CM

## 2019-12-23 DIAGNOSIS — M15.9 PRIMARY OSTEOARTHRITIS INVOLVING MULTIPLE JOINTS: ICD-10-CM

## 2019-12-23 PROBLEM — E78.5 HYPERLIPIDEMIA: Status: ACTIVE | Noted: 2019-12-23

## 2019-12-23 PROBLEM — E87.6 HYPOKALEMIA: Status: ACTIVE | Noted: 2018-07-10

## 2019-12-23 PROBLEM — M19.90 OSTEOARTHRITIS: Status: ACTIVE | Noted: 2019-12-23

## 2019-12-23 PROBLEM — M10.9 GOUT: Status: ACTIVE | Noted: 2019-12-23

## 2019-12-23 PROCEDURE — G0439 PPPS, SUBSEQ VISIT: HCPCS | Performed by: FAMILY MEDICINE

## 2019-12-23 NOTE — ASSESSMENT & PLAN NOTE
She has not had a lipid profile in some time.  Will continue on atorvastatin and repeat lipid profile with her next blood work.

## 2019-12-23 NOTE — PROGRESS NOTES
Adelso Lynne presents for Annual Wellness Visit as well as stable chronic medical conditions as listed.      Past Medical History:   Diagnosis Date   • Arthritis    • Deep venous thrombosis (CMS/HCC)    • Hyperlipidemia    • Hypertension    • Pulmonary embolism (CMS/HCC)      Past Surgical History:   Procedure Laterality Date   • ANKLE SURGERY      due to fracture   • CYST REMOVAL      from the left wrist    • HYSTERECTOMY       Family History   Problem Relation Age of Onset   • Breast cancer Daughter         onset in 40s   • Prostate cancer Son         onset in 60s   • Colon cancer Other      Social History     Tobacco Use   • Smoking status: Never Smoker   • Smokeless tobacco: Never Used   Substance Use Topics   • Alcohol use: Never     Frequency: Never       No visits with results within 7 Day(s) from this visit.   Latest known visit with results is:   Lab on 12/12/2019   Component Date Value Ref Range Status   • INR 12/12/2019 2.00   Final         Diagnoses and all orders for this visit:    1. Medicare annual wellness visit, subsequent (Primary)  Assessment & Plan:  Discussed injury prevention, diet and exercise, safe sexual practices, and screening for common diseases. Encouraged use of sunscreen and seatbelts. Encouraged SBE, avoidance of tobacco, limiting alcohol, and yearly dental and eye exams.  She has moved to [place] and will be changing to primary care doctor closer to her home.  Continue hematology/oncology follow-up.      2. At moderate risk for fall    3. Other chronic pulmonary embolism without acute cor pulmonale (CMS/HCC)  Assessment & Plan:  She continues on anticoagulation therapy managed by oncology/hematology.      4. Essential hypertension  Assessment & Plan:  Stable.  Doing well.  Continue current medications.  Blood work reviewed.      5. Primary osteoarthritis involving multiple joints  Assessment & Plan:  At baseline.      6. Familial hypercholesterolemia  Assessment & Plan:  She has  not had a lipid profile in some time.  Will continue on atorvastatin and repeat lipid profile with her next blood work.      7. Idiopathic chronic gout of multiple sites without tophus  Assessment & Plan:  At baseline.  Continue allopurinol.          Chief Complaint   Patient presents with   • Medicare Wellness-subsequent       Subjective   History of Present Illness:  Adelso Lynne is a 79 y.o. female who presents for a Subsequent Medicare Wellness Visit.    HEALTH RISK ASSESSMENT    Recent Hospitalizations:  No hospitalization(s) within the last year.    Current Medical Providers:  Patient Care Team:  Lyell, Reggie Duane, MD as PCP - General (Family Medicine)  Anushka Patel MD as PCP - Claims Attributed  Anushka Patel MD as Consulting Physician (Hematology and Oncology)    Smoking Status:  Social History     Tobacco Use   Smoking Status Never Smoker   Smokeless Tobacco Never Used       Alcohol Consumption:  Social History     Substance and Sexual Activity   Alcohol Use Never   • Frequency: Never       Depression Screen:   PHQ-2/PHQ-9 Depression Screening 12/23/2019   Little interest or pleasure in doing things 0   Feeling down, depressed, or hopeless 0   Trouble falling or staying asleep, or sleeping too much 0   Feeling tired or having little energy 0   Poor appetite or overeating 0   Feeling bad about yourself - or that you are a failure or have let yourself or your family down 0   Trouble concentrating on things, such as reading the newspaper or watching television 0   Moving or speaking so slowly that other people could have noticed. Or the opposite - being so fidgety or restless that you have been moving around a lot more than usual 0   Thoughts that you would be better off dead, or of hurting yourself in some way 0   Total Score 0   If you checked off any problems, how difficult have these problems made it for you to do your work, take care of things at home, or get along with other  people? Not difficult at all       Fall Risk Screen:  SHOBHA Fall Risk Assessment was completed, and patient is at MODERATE risk for falls. Assessment completed on:12/23/2019    Health Habits and Functional and Cognitive Screening:  Functional & Cognitive Status 12/23/2019   Do you have difficulty preparing food and eating? No   Do you have difficulty bathing yourself, getting dressed or grooming yourself? No   Do you have difficulty using the toilet? No   Do you have difficulty moving around from place to place? No   Do you have trouble with steps or getting out of a bed or a chair? No   Current Diet Frequent Junk Food   Dental Exam Not up to date   Eye Exam Not up to date   Exercise (times per week) 0 times per week   Current Exercise Activities Include None   Do you need help using the phone?  No   Are you deaf or do you have serious difficulty hearing?  No   Do you need help with transportation? No   Do you need help shopping? No   Do you need help preparing meals?  No   Do you need help with housework?  No   Do you need help with laundry? No   Do you need help taking your medications? No   Do you need help managing money? No   Do you ever drive or ride in a car without wearing a seat belt? No   Have you felt unusual stress, anger or loneliness in the last month? No   Who do you live with? Child   If you need help, do you have trouble finding someone available to you? No   Have you been bothered in the last four weeks by sexual problems? No   Do you have difficulty concentrating, remembering or making decisions? No         Does the patient have evidence of cognitive impairment? Yes    Asprin use counseling:Does not need ASA (and currently is not on it)    Age-appropriate Screening Schedule:  Refer to the list below for future screening recommendations based on patient's age, sex and/or medical conditions. Orders for these recommended tests are listed in the plan section. The patient has been provided with a  written plan.    Health Maintenance   Topic Date Due   • TDAP/TD VACCINES (1 - Tdap) 07/16/1951   • ZOSTER VACCINE (1 of 2) 07/16/1989   • PNEUMOCOCCAL VACCINES (65+ LOW/MEDIUM RISK) (1 of 2 - PCV13) 07/16/2004   • LIPID PANEL  06/18/2019   • INFLUENZA VACCINE  Completed          The following portions of the patient's history were reviewed and updated as appropriate: allergies, current medications, past family history, past medical history, past social history, past surgical history and problem list.    Outpatient Medications Prior to Visit   Medication Sig Dispense Refill   • allopurinol (ZYLOPRIM) 300 MG tablet TAKE 1 TABLET BY MOUTH ONCE DAILY 90 tablet 1   • atorvastatin (LIPITOR) 10 MG tablet Take 1 tablet by mouth Every Evening. 90 tablet 0   • Cholecalciferol (VITAMIN D) 2000 units tablet Take 2,000 Units by mouth Daily.     • potassium chloride (K-DUR,KLOR-CON) 20 MEQ CR tablet TAKE 1 TABLET BY MOUTH ONCE DAILY 90 tablet 1   • triamterene-hydrochlorothiazide (MAXZIDE) 75-50 MG per tablet TAKE 1 TABLET BY MOUTH ONCE DAILY 90 tablet 1   • warfarin (COUMADIN) 1 MG tablet Once daily as directed 90 tablet 3   • warfarin (COUMADIN) 3 MG tablet TAKE 1 TABLET BY MOUTH ONCE DAILY 30 tablet 3     No facility-administered medications prior to visit.        Patient Active Problem List   Diagnosis   • IVC thrombosis (CMS/HCC)   • Pulmonary embolism (CMS/HCC)   • Anticardiolipin antibody positive   • Arthritis, rheumatoid (CMS/HCC)   • Congenital spondylolisthesis   • High alkaline phosphatase   • Elevated liver enzymes   • Gout   • Hyperlipidemia   • Hypertension   • Hypokalemia   • Impaired glucose tolerance   • Menopausal syndrome   • Osteoarthritis   • Venous insufficiency   • Vitamin D deficiency   • Medicare annual wellness visit, subsequent       Advanced Care Planning:  Patient does not have an advance directive - information provided to the patient today    Review of Systems   Constitutional: Negative for  activity change, appetite change, chills, fatigue, fever and unexpected weight change.   HENT: Negative for congestion, ear discharge, ear pain, facial swelling, hearing loss, nosebleeds, postnasal drip, rhinorrhea, sinus pressure, sinus pain, sneezing, sore throat, tinnitus, trouble swallowing and voice change.    Eyes: Negative for photophobia, pain, discharge, redness, itching and visual disturbance.   Respiratory: Negative for apnea, cough, choking, chest tightness, shortness of breath and wheezing.    Cardiovascular: Negative for chest pain and palpitations.   Gastrointestinal: Negative for abdominal distention, abdominal pain, blood in stool, constipation, diarrhea, nausea and vomiting.   Endocrine: Negative for cold intolerance, heat intolerance, polydipsia and polyphagia.   Genitourinary: Negative for difficulty urinating, dysuria, enuresis, flank pain, frequency, hematuria, pelvic pain and urgency.   Musculoskeletal: Positive for arthralgias, gait problem and myalgias. Negative for back pain, joint swelling, neck pain and neck stiffness.   Skin: Negative for pallor, rash and wound.   Allergic/Immunologic: Negative for environmental allergies and food allergies.   Neurological: Negative for dizziness, tremors, seizures, syncope, speech difficulty, weakness, light-headedness, numbness and headaches.   Hematological: Negative for adenopathy. Does not bruise/bleed easily.   Psychiatric/Behavioral: Negative for confusion, decreased concentration, hallucinations and sleep disturbance. The patient is not nervous/anxious.        Compared to one year ago, the patient feels her physical health is the same.  Compared to one year ago, the patient feels her mental health is the same.    Reviewed chart for potential of high risk medication in the elderly: yes  Reviewed chart for potential of harmful drug interactions in the elderly:yes    Objective         Vitals:    12/23/19 1045   BP: 129/79   BP Location: Left arm  "  Patient Position: Sitting   Cuff Size: Adult   Pulse: 119   Resp: 18   Temp: 98.1 °F (36.7 °C)   TempSrc: Oral   SpO2: 94%   Weight: 93.9 kg (207 lb)   Height: 165.1 cm (65\")   PainSc: 0-No pain       Body mass index is 34.45 kg/m².  Discussed the patient's BMI with her. The BMI is above average; BMI management plan is completed.    Physical Exam   Constitutional: She is oriented to person, place, and time. She appears well-developed and well-nourished.   HENT:   Head: Normocephalic and atraumatic.   Eyes: Pupils are equal, round, and reactive to light. Conjunctivae and EOM are normal. Right eye exhibits no discharge. Left eye exhibits no discharge. No scleral icterus.   Neck: No thyromegaly present.   Cardiovascular: Normal rate, regular rhythm and normal heart sounds. Exam reveals no gallop and no friction rub.   No murmur heard.  Pulmonary/Chest: Effort normal and breath sounds normal. No respiratory distress. She has no wheezes. She has no rales. Right breast exhibits no mass, no nipple discharge, no skin change and no tenderness. Left breast exhibits no mass, no nipple discharge, no skin change and no tenderness. No breast tenderness or discharge. Breasts are symmetrical.   Abdominal: Soft. Bowel sounds are normal. She exhibits no distension and no mass. There is no tenderness. There is no rebound and no guarding.   Genitourinary: No breast tenderness or discharge.   Musculoskeletal: Normal range of motion. She exhibits no edema, tenderness or deformity.   Lymphadenopathy:     She has no cervical adenopathy.   Neurological: She is alert and oriented to person, place, and time. She displays normal reflexes. No cranial nerve deficit or sensory deficit. She exhibits normal muscle tone. Coordination normal.   Skin: Skin is warm and dry. No rash noted. No erythema.   Psychiatric: She has a normal mood and affect. Her behavior is normal. Judgment and thought content normal.             Assessment/Plan   Medicare " Risks and Personalized Health Plan  CMS Preventative Services Quick Reference  Advance Directive Discussion  Fall Risk    The above risks/problems have been discussed with the patient.  Pertinent information has been shared with the patient in the After Visit Summary.  Follow up plans and orders are seen below in the Assessment/Plan Section.    Diagnoses and all orders for this visit:    1. Medicare annual wellness visit, subsequent (Primary)  Assessment & Plan:  Discussed injury prevention, diet and exercise, safe sexual practices, and screening for common diseases. Encouraged use of sunscreen and seatbelts. Encouraged SBE, avoidance of tobacco, limiting alcohol, and yearly dental and eye exams.  She has moved to [place] and will be changing to primary care doctor closer to her home.  Continue hematology/oncology follow-up.      2. At moderate risk for fall    3. Other chronic pulmonary embolism without acute cor pulmonale (CMS/[facility])  Assessment & Plan:  She continues on anticoagulation therapy managed by oncology/hematology.      4. Essential hypertension  Assessment & Plan:  Stable.  Doing well.  Continue current medications.  Blood work reviewed.      5. Primary osteoarthritis involving multiple joints  Assessment & Plan:  At baseline.      6. Familial hypercholesterolemia  Assessment & Plan:  She has not had a lipid profile in some time.  Will continue on atorvastatin and repeat lipid profile with her next blood work.      7. Idiopathic chronic gout of multiple sites without tophus  Assessment & Plan:  At baseline.  Continue allopurinol.      Follow Up:  No follow-ups on file.     An After Visit Summary and PPPS were given to the patient.

## 2019-12-23 NOTE — ASSESSMENT & PLAN NOTE
Discussed injury prevention, diet and exercise, safe sexual practices, and screening for common diseases. Encouraged use of sunscreen and seatbelts. Encouraged SBE, avoidance of tobacco, limiting alcohol, and yearly dental and eye exams.  She has moved to [place] and will be changing to primary care doctor closer to her home.  Continue hematology/oncology follow-up.

## 2020-01-16 ENCOUNTER — LAB (OUTPATIENT)
Dept: LAB | Facility: HOSPITAL | Age: 80
End: 2020-01-16

## 2020-01-16 ENCOUNTER — HOSPITAL ENCOUNTER (OUTPATIENT)
Dept: ONCOLOGY | Facility: HOSPITAL | Age: 80
Discharge: HOME | End: 2020-01-16
Admitting: INTERNAL MEDICINE

## 2020-01-16 VITALS
WEIGHT: 202 LBS | HEART RATE: 94 BPM | RESPIRATION RATE: 18 BRPM | DIASTOLIC BLOOD PRESSURE: 80 MMHG | BODY MASS INDEX: 33.66 KG/M2 | HEIGHT: 65 IN | TEMPERATURE: 97 F | SYSTOLIC BLOOD PRESSURE: 130 MMHG

## 2020-01-16 DIAGNOSIS — I27.82 OTHER CHRONIC PULMONARY EMBOLISM WITHOUT ACUTE COR PULMONALE (HCC): Primary | ICD-10-CM

## 2020-01-16 DIAGNOSIS — I82.220 IVC THROMBOSIS (HCC): ICD-10-CM

## 2020-01-16 DIAGNOSIS — I27.82 OTHER CHRONIC PULMONARY EMBOLISM WITHOUT ACUTE COR PULMONALE (HCC): ICD-10-CM

## 2020-01-16 LAB — INR PPP: 3.6

## 2020-01-16 PROCEDURE — 85610 PROTHROMBIN TIME: CPT

## 2020-01-16 NOTE — ADDENDUM NOTE
Encounter addended by: Gerri Velazquez RN on: 1/16/2020 10:08 AM   Actions taken: Anticoagulation related activity accessed

## 2020-01-23 ENCOUNTER — OFFICE VISIT (OUTPATIENT)
Dept: ONCOLOGY | Facility: CLINIC | Age: 80
End: 2020-01-23

## 2020-01-23 ENCOUNTER — OFFICE VISIT (OUTPATIENT)
Dept: LAB | Facility: HOSPITAL | Age: 80
End: 2020-01-23

## 2020-01-23 ENCOUNTER — HOSPITAL ENCOUNTER (OUTPATIENT)
Dept: ONCOLOGY | Facility: HOSPITAL | Age: 80
Discharge: HOME | End: 2020-01-23
Admitting: NURSE PRACTITIONER

## 2020-01-23 VITALS
HEART RATE: 82 BPM | BODY MASS INDEX: 32.66 KG/M2 | TEMPERATURE: 98.1 F | WEIGHT: 203.2 LBS | DIASTOLIC BLOOD PRESSURE: 68 MMHG | HEIGHT: 66 IN | RESPIRATION RATE: 18 BRPM | SYSTOLIC BLOOD PRESSURE: 106 MMHG

## 2020-01-23 VITALS — WEIGHT: 203 LBS | HEIGHT: 65 IN | RESPIRATION RATE: 18 BRPM | BODY MASS INDEX: 33.82 KG/M2

## 2020-01-23 DIAGNOSIS — Z79.01 LONG TERM (CURRENT) USE OF ANTICOAGULANTS: Primary | ICD-10-CM

## 2020-01-23 DIAGNOSIS — Z51.81 ENCOUNTER FOR MONITORING COUMADIN THERAPY: Primary | ICD-10-CM

## 2020-01-23 DIAGNOSIS — I82.220 IVC THROMBOSIS (HCC): Primary | ICD-10-CM

## 2020-01-23 DIAGNOSIS — Z79.01 ENCOUNTER FOR MONITORING COUMADIN THERAPY: Primary | ICD-10-CM

## 2020-01-23 DIAGNOSIS — I27.82 OTHER CHRONIC PULMONARY EMBOLISM WITHOUT ACUTE COR PULMONALE (HCC): ICD-10-CM

## 2020-01-23 LAB
ALBUMIN SERPL-MCNC: 3.8 G/DL (ref 3.5–5.2)
ALBUMIN/GLOB SERPL: 1 G/DL
ALP SERPL-CCNC: 145 U/L (ref 39–117)
ALT SERPL W P-5'-P-CCNC: 30 U/L (ref 1–33)
ANION GAP SERPL CALCULATED.3IONS-SCNC: 13 MMOL/L (ref 5–15)
AST SERPL-CCNC: 34 U/L (ref 1–32)
BASOPHILS # BLD AUTO: 0.01 10*3/MM3 (ref 0–0.2)
BASOPHILS NFR BLD AUTO: 0.1 % (ref 0–1.5)
BILIRUB SERPL-MCNC: 0.7 MG/DL (ref 0.2–1.2)
BUN BLD-MCNC: 13 MG/DL (ref 8–23)
BUN/CREAT SERPL: 16 (ref 7–25)
CALCIUM SPEC-SCNC: 10.1 MG/DL (ref 8.6–10.5)
CHLORIDE SERPL-SCNC: 100 MMOL/L (ref 98–107)
CO2 SERPL-SCNC: 30 MMOL/L (ref 22–29)
CREAT BLD-MCNC: 0.81 MG/DL (ref 0.57–1)
DEPRECATED RDW RBC AUTO: 47.7 FL (ref 37–54)
EOSINOPHIL # BLD AUTO: 0.21 10*3/MM3 (ref 0–0.4)
EOSINOPHIL NFR BLD AUTO: 2.9 % (ref 0.3–6.2)
ERYTHROCYTE [DISTWIDTH] IN BLOOD BY AUTOMATED COUNT: 14.1 % (ref 12.3–15.4)
GFR SERPL CREATININE-BSD FRML MDRD: 82 ML/MIN/1.73
GLOBULIN UR ELPH-MCNC: 3.9 GM/DL
GLUCOSE BLD-MCNC: 92 MG/DL (ref 65–99)
HCT VFR BLD AUTO: 44.5 % (ref 34–46.6)
HGB BLD-MCNC: 14.8 G/DL (ref 12–15.9)
INR PPP: 2.3
LYMPHOCYTES # BLD AUTO: 2.8 10*3/MM3 (ref 0.7–3.1)
LYMPHOCYTES NFR BLD AUTO: 38.6 % (ref 19.6–45.3)
MCH RBC QN AUTO: 31.6 PG (ref 26.6–33)
MCHC RBC AUTO-ENTMCNC: 33.3 G/DL (ref 31.5–35.7)
MCV RBC AUTO: 95.1 FL (ref 79–97)
MONOCYTES # BLD AUTO: 0.76 10*3/MM3 (ref 0.1–0.9)
MONOCYTES NFR BLD AUTO: 10.5 % (ref 5–12)
NEUTROPHILS # BLD AUTO: 3.48 10*3/MM3 (ref 1.7–7)
NEUTROPHILS NFR BLD AUTO: 47.9 % (ref 42.7–76)
PLATELET # BLD AUTO: 320 10*3/MM3 (ref 140–450)
PMV BLD AUTO: 9.2 FL (ref 6–12)
POTASSIUM BLD-SCNC: 3.8 MMOL/L (ref 3.5–5.2)
PROT SERPL-MCNC: 7.7 G/DL (ref 6–8.5)
RBC # BLD AUTO: 4.68 10*6/MM3 (ref 3.77–5.28)
SODIUM BLD-SCNC: 143 MMOL/L (ref 136–145)
WBC NRBC COR # BLD: 7.26 10*3/MM3 (ref 3.4–10.8)

## 2020-01-23 PROCEDURE — 85610 PROTHROMBIN TIME: CPT

## 2020-01-23 PROCEDURE — 80053 COMPREHEN METABOLIC PANEL: CPT | Performed by: NURSE PRACTITIONER

## 2020-01-23 PROCEDURE — 99214 OFFICE O/P EST MOD 30 MIN: CPT | Performed by: INTERNAL MEDICINE

## 2020-01-23 PROCEDURE — 85025 COMPLETE CBC W/AUTO DIFF WBC: CPT

## 2020-01-23 PROCEDURE — 36415 COLL VENOUS BLD VENIPUNCTURE: CPT | Performed by: INTERNAL MEDICINE

## 2020-01-23 NOTE — PROGRESS NOTES
Hematology/Oncology Outpatient Follow Up    PATIENT NAME:Adelso Lynne  :1940  MRN: [id number]  PRIMARY CARE PHYSICIAN: Lyell, Reggie Duane, MD  REFERRING PHYSICIAN: Lyell, Reggie Duane, MD    Chief Complaint   Patient presents with   • Follow-up     History of pulmonary embolism        HISTORY OF PRESENT ILLNESS:   This is a 78-year-old female who has a history of pulmonary embolism at the age of 25 [more than 50 years ago].  Subsequently she developed deep venous thrombosis and patient was treated with Warfarin therapy at the time.  She has a strong family  history of blood clots in her mother, brother, son and sister, but there is no known mutation yet identified in the family.  Patient was seen by Amalia Salamanca with [facility] and had an MRI of the abdomen to evaluate elevated liver function tests.  The liver showed evidence of fatty infiltration, but there was no mass identified.  The kidneys were normal.  The spleen did not show any abnormalities, as well as the pancreas.  There was a filling defect noted in the inferior vena cava below the renal veins, thought to represent some partial thrombosis.  The patient was then placed on Lovenox 40 mg subcutaneously daily and then referred for further evaluation and management of her thrombophilia.  Patient denies any new symptoms such as weight loss, night sweats or fatigue.  She is able to carry out her own activities of daily living.  Her last colonoscopy was about three to four years ago and she is up to date on her routine mammograms.  Patient is accompanied by her daughter today for this appointment.      • Since her last visit on 16, patient was initiated on anticoagulation with Warfarin.  Anticardiolipin IgG antibody was slightly high at 24 [normal < 23], beta-2 glycoprotein was negative.  Factor V Leiden was not mutated.  Prothrombin gene was not mutated as well.  PT 12.4, INR 1, PTT 27.  Protein C activity (N) 129.  Antithrombin III activity (N) 99.   Protein S activity (N) 85%.          Past Medical History:   Diagnosis Date   • Arthritis    • Deep venous thrombosis (CMS/HCC)    • Hyperlipidemia    • Hypertension    • Pulmonary embolism (CMS/HCC)        Past Surgical History:   Procedure Laterality Date   • ANKLE SURGERY      due to fracture   • CYST REMOVAL      from the left wrist    • HYSTERECTOMY           Current Outpatient Medications:   •  allopurinol (ZYLOPRIM) 300 MG tablet, TAKE 1 TABLET BY MOUTH ONCE DAILY, Disp: 90 tablet, Rfl: 1  •  atorvastatin (LIPITOR) 10 MG tablet, Take 1 tablet by mouth Every Evening., Disp: 90 tablet, Rfl: 0  •  Cholecalciferol (VITAMIN D) 2000 units tablet, Take 2,000 Units by mouth Daily., Disp: , Rfl:   •  potassium chloride (K-DUR,KLOR-CON) 20 MEQ CR tablet, TAKE 1 TABLET BY MOUTH ONCE DAILY, Disp: 90 tablet, Rfl: 1  •  triamterene-hydrochlorothiazide (MAXZIDE) 75-50 MG per tablet, TAKE 1 TABLET BY MOUTH ONCE DAILY, Disp: 90 tablet, Rfl: 1  •  warfarin (COUMADIN) 1 MG tablet, Once daily as directed (Patient taking differently: 2.5 mg. Once daily as directed), Disp: 90 tablet, Rfl: 3  •  warfarin (COUMADIN) 3 MG tablet, TAKE 1 TABLET BY MOUTH ONCE DAILY, Disp: 30 tablet, Rfl: 3    Allergies   Allergen Reactions   • Codeine Shortness Of Breath, Rash and Unknown (See Comments)   • Hydroxychloroquine Rash     Light flashes spider web in right eye and black dot in left eye       Family History   Problem Relation Age of Onset   • Breast cancer Daughter         onset in 40s   • Prostate cancer Son         onset in 60s   • Colon cancer Other        Cancer-related family history includes Breast cancer in her daughter; Colon cancer in an other family member; Prostate cancer in her son.    Social History     Tobacco Use   • Smoking status: Never Smoker   • Smokeless tobacco: Never Used   Substance Use Topics   • Alcohol use: Never     Frequency: Never   • Drug use: Never       I have reviewed the history of present illness, past  "medical history, family history, social history, lab results, all notes and other records since the patient was last seen on 6/26/2019.    SUBJECTIVE:  Patient is here today for routine follow-up.  She is planning to have dental procedures in 3/2020.  Her dental surgeon is at the [facility].  Patient will let me know once this is scheduled for Lovenox bridge.  She denies any bleeding issues.  She denies nausea or vomiting.  She is followed very closely in the Coumadin clinic.          REVIEW OF SYSTEMS:  Review of Systems   Constitutional: Negative for chills and fever.   HENT: Negative for ear pain, mouth sores, nosebleeds and sore throat.    Eyes: Negative for photophobia and visual disturbance.   Respiratory: Negative for wheezing and stridor.    Cardiovascular: Negative for chest pain and palpitations.   Gastrointestinal: Negative for abdominal pain, diarrhea, nausea and vomiting.   Endocrine: Negative for cold intolerance and heat intolerance.   Genitourinary: Negative for dysuria and hematuria.   Musculoskeletal: Negative for joint swelling and neck stiffness.   Skin: Negative for color change and rash.   Neurological: Negative for seizures and syncope.   Hematological: Negative for adenopathy.        No obvious bleeding   Psychiatric/Behavioral: Negative for agitation, confusion and hallucinations.       OBJECTIVE:    Vitals:    01/23/20 1016   BP: 106/68   Pulse: 82   Resp: 18   Temp: 98.1 °F (36.7 °C)   Weight: 92.2 kg (203 lb 3.2 oz)   Height: 167.6 cm (66\")   PainSc: 0-No pain       ECOG  (1) Restricted in physically strenuous activity, ambulatory and able to do work of light nature    Physical Exam   Constitutional: She is oriented to person, place, and time. No distress.   HENT:   Head: Normocephalic and atraumatic.   Eyes: Conjunctivae and EOM are normal. Right eye exhibits no discharge. Left eye exhibits no discharge. No scleral icterus.   Neck: Normal range of motion. Neck " supple. No thyromegaly present.   Cardiovascular: Normal rate, regular rhythm and normal heart sounds. Exam reveals no gallop and no friction rub.   Pulmonary/Chest: Effort normal. No stridor. No respiratory distress. She has no wheezes.   Abdominal: Soft. Bowel sounds are normal. She exhibits no mass. There is no tenderness. There is no rebound and no guarding.   Musculoskeletal: Normal range of motion. She exhibits no tenderness.   Lymphadenopathy:     She has no cervical adenopathy.   Neurological: She is alert and oriented to person, place, and time. She exhibits normal muscle tone.   Skin: Skin is warm. No rash noted. She is not diaphoretic. No erythema.   Psychiatric: She has a normal mood and affect. Her behavior is normal.   Nursing note and vitals reviewed.      RECENT LABS  WBC   Date Value Ref Range Status   01/23/2020 7.26 3.40 - 10.80 10*3/mm3 Final     RBC   Date Value Ref Range Status   01/23/2020 4.68 3.77 - 5.28 10*6/mm3 Final     Hemoglobin   Date Value Ref Range Status   01/23/2020 14.8 12.0 - 15.9 g/dL Final     Hematocrit   Date Value Ref Range Status   01/23/2020 44.5 34.0 - 46.6 % Final     MCV   Date Value Ref Range Status   01/23/2020 95.1 79.0 - 97.0 fL Final     MCH   Date Value Ref Range Status   01/23/2020 31.6 26.6 - 33.0 pg Final     MCHC   Date Value Ref Range Status   01/23/2020 33.3 31.5 - 35.7 g/dL Final     RDW   Date Value Ref Range Status   01/23/2020 14.1 12.3 - 15.4 % Final     RDW-SD   Date Value Ref Range Status   01/23/2020 47.7 37.0 - 54.0 fl Final     MPV   Date Value Ref Range Status   01/23/2020 9.2 6.0 - 12.0 fL Final     Platelets   Date Value Ref Range Status   01/23/2020 320 140 - 450 10*3/mm3 Final     Neutrophil %   Date Value Ref Range Status   01/23/2020 47.9 42.7 - 76.0 % Final     Lymphocyte %   Date Value Ref Range Status   01/23/2020 38.6 19.6 - 45.3 % Final     Monocyte %   Date Value Ref Range Status   01/23/2020 10.5 5.0 - 12.0 % Final     Eosinophil %    Date Value Ref Range Status   01/23/2020 2.9 0.3 - 6.2 % Final     Basophil %   Date Value Ref Range Status   01/23/2020 0.1 0.0 - 1.5 % Final     Neutrophils, Absolute   Date Value Ref Range Status   01/23/2020 3.48 1.70 - 7.00 10*3/mm3 Final     Lymphocytes, Absolute   Date Value Ref Range Status   01/23/2020 2.80 0.70 - 3.10 10*3/mm3 Final     Monocytes, Absolute   Date Value Ref Range Status   01/23/2020 0.76 0.10 - 0.90 10*3/mm3 Final     Eosinophils, Absolute   Date Value Ref Range Status   01/23/2020 0.21 0.00 - 0.40 10*3/mm3 Final     Basophils, Absolute   Date Value Ref Range Status   01/23/2020 0.01 0.00 - 0.20 10*3/mm3 Final     nRBC   Date Value Ref Range Status   06/22/2019 0.1 0.0 - 0.2 /100 WBC Final       Lab Results   Component Value Date    GLUCOSE 92 01/23/2020    BUN 13 01/23/2020    CREATININE 0.81 01/23/2020    EGFRIFNONA 90 11/17/2017    EGFRIFAFRI 82 01/23/2020    BCR 16.0 01/23/2020    K 3.8 01/23/2020    CO2 30.0 (H) 01/23/2020    CALCIUM 10.1 01/23/2020    ALBUMIN 3.80 01/23/2020    LABIL2 0.8 (L) 02/21/2019    AST 34 (H) 01/23/2020    ALT 30 01/23/2020         Assessment/Plan     Encounter for monitoring Coumadin therapy  - Comprehensive Metabolic Panel      ASSESSMENT:    1. 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   2. History of pulmonary embolism and deep venous thrombosis in the past.  3. Strong family history of thrombophilia in multiple first degree relatives.   4. Anticardiolipin IgG antibody, slightly elevated at 24.   5. Monitoring Coumadin therapy.  Supratherapeutic today.     PLAN:  I have reviewed her INR.  She is therapeutic.  Patient will let me know when her Dental surgery is scheduled for her Lovenox bridge. She will let me know she should develop any issues prior to the next visit.    She will let me know if she develops any bleeding complications.  She will continue to be monitored in our Coumadin clinic.     She is in the process of finding a new primary care  physician.  Patient has asked questions which have all been answered to the best of my abilities.                 I have reviewed labs results, imaging, vitals, and medications with the patient today. Will follow up in 4 months with me.  She will call me for problems in the interim    Patient verbalized understanding and is in agreement of the above plan.    Part of this document was scribed by Sarah Tanner RN, BSN.

## 2020-01-27 RX ORDER — WARFARIN SODIUM 1 MG/1
TABLET ORAL
Qty: 30 TABLET | Refills: 0 | Status: SHIPPED | OUTPATIENT
Start: 2020-01-27 | End: 2020-04-06

## 2020-01-28 ENCOUNTER — TELEPHONE (OUTPATIENT)
Dept: ONCOLOGY | Facility: CLINIC | Age: 80
End: 2020-01-28

## 2020-01-28 RX ORDER — ATORVASTATIN CALCIUM 10 MG/1
TABLET, FILM COATED ORAL
Qty: 90 TABLET | Refills: 0 | Status: SHIPPED | OUTPATIENT
Start: 2020-01-28 | End: 2020-04-06

## 2020-01-28 NOTE — TELEPHONE ENCOUNTER
Please call patient regarding information she said the doctor gave her. She has additional questions. I could not get anymore information.    Call [phone].

## 2020-01-28 NOTE — TELEPHONE ENCOUNTER
Returned call to patient.  She was requesting phone numbers for Dr. Mckee and Dr. Veliz.  Phone numbers provided.

## 2020-01-30 ENCOUNTER — HOSPITAL ENCOUNTER (OUTPATIENT)
Dept: ONCOLOGY | Facility: HOSPITAL | Age: 80
Discharge: HOME | End: 2020-01-30
Admitting: INTERNAL MEDICINE

## 2020-01-30 ENCOUNTER — LAB (OUTPATIENT)
Dept: LAB | Facility: HOSPITAL | Age: 80
End: 2020-01-30

## 2020-01-30 VITALS — HEIGHT: 66 IN | BODY MASS INDEX: 32.47 KG/M2 | RESPIRATION RATE: 18 BRPM | WEIGHT: 202 LBS

## 2020-01-30 DIAGNOSIS — I82.220 IVC THROMBOSIS (HCC): ICD-10-CM

## 2020-01-30 DIAGNOSIS — Z79.01 LONG TERM (CURRENT) USE OF ANTICOAGULANTS: Primary | ICD-10-CM

## 2020-01-30 DIAGNOSIS — I27.82 OTHER CHRONIC PULMONARY EMBOLISM WITHOUT ACUTE COR PULMONALE (HCC): ICD-10-CM

## 2020-01-30 LAB
INR PPP: 1.7
INR PPP: 1.7 (ref 2–3)

## 2020-01-30 PROCEDURE — 85610 PROTHROMBIN TIME: CPT

## 2020-01-30 PROCEDURE — 36416 COLLJ CAPILLARY BLOOD SPEC: CPT

## 2020-02-06 ENCOUNTER — LAB (OUTPATIENT)
Dept: LAB | Facility: HOSPITAL | Age: 80
End: 2020-02-06

## 2020-02-06 ENCOUNTER — HOSPITAL ENCOUNTER (OUTPATIENT)
Dept: ONCOLOGY | Facility: HOSPITAL | Age: 80
Discharge: HOME | End: 2020-02-06
Admitting: INTERNAL MEDICINE

## 2020-02-06 VITALS — HEIGHT: 66 IN | RESPIRATION RATE: 18 BRPM | BODY MASS INDEX: 32.62 KG/M2 | TEMPERATURE: 98 F | WEIGHT: 203 LBS

## 2020-02-06 DIAGNOSIS — I27.82 OTHER CHRONIC PULMONARY EMBOLISM WITHOUT ACUTE COR PULMONALE (HCC): ICD-10-CM

## 2020-02-06 DIAGNOSIS — I82.220 IVC THROMBOSIS (HCC): ICD-10-CM

## 2020-02-06 DIAGNOSIS — Z79.01 LONG TERM (CURRENT) USE OF ANTICOAGULANTS: Primary | ICD-10-CM

## 2020-02-06 LAB — INR PPP: 2.1

## 2020-02-06 PROCEDURE — 85610 PROTHROMBIN TIME: CPT

## 2020-02-13 ENCOUNTER — LAB (OUTPATIENT)
Dept: LAB | Facility: HOSPITAL | Age: 80
End: 2020-02-13

## 2020-02-13 ENCOUNTER — HOSPITAL ENCOUNTER (OUTPATIENT)
Dept: ONCOLOGY | Facility: HOSPITAL | Age: 80
Discharge: HOME | End: 2020-02-13
Admitting: INTERNAL MEDICINE

## 2020-02-13 VITALS — BODY MASS INDEX: 32.62 KG/M2 | RESPIRATION RATE: 18 BRPM | WEIGHT: 203 LBS | HEIGHT: 66 IN | TEMPERATURE: 98 F

## 2020-02-13 DIAGNOSIS — I82.220 IVC THROMBOSIS (HCC): ICD-10-CM

## 2020-02-13 DIAGNOSIS — I27.82 OTHER CHRONIC PULMONARY EMBOLISM WITHOUT ACUTE COR PULMONALE (HCC): ICD-10-CM

## 2020-02-13 DIAGNOSIS — Z79.01 LONG TERM (CURRENT) USE OF ANTICOAGULANTS: Primary | ICD-10-CM

## 2020-02-13 LAB — INR PPP: 2.2

## 2020-02-13 PROCEDURE — 85610 PROTHROMBIN TIME: CPT

## 2020-02-13 PROCEDURE — 36416 COLLJ CAPILLARY BLOOD SPEC: CPT

## 2020-03-01 RX ORDER — ALLOPURINOL 300 MG/1
TABLET ORAL
Qty: 90 TABLET | Refills: 0 | Status: SHIPPED | OUTPATIENT
Start: 2020-03-01 | End: 2020-04-06

## 2020-03-01 RX ORDER — TRIAMTERENE AND HYDROCHLOROTHIAZIDE 75; 50 MG/1; MG/1
TABLET ORAL
Qty: 90 TABLET | Refills: 0 | Status: SHIPPED | OUTPATIENT
Start: 2020-03-01 | End: 2020-04-06

## 2020-03-12 ENCOUNTER — APPOINTMENT (OUTPATIENT)
Dept: LAB | Facility: HOSPITAL | Age: 80
End: 2020-03-12

## 2020-03-12 ENCOUNTER — APPOINTMENT (OUTPATIENT)
Dept: ONCOLOGY | Facility: HOSPITAL | Age: 80
End: 2020-03-12

## 2020-03-13 ENCOUNTER — LAB (OUTPATIENT)
Dept: LAB | Facility: HOSPITAL | Age: 80
End: 2020-03-13

## 2020-03-13 ENCOUNTER — HOSPITAL ENCOUNTER (OUTPATIENT)
Dept: ONCOLOGY | Facility: HOSPITAL | Age: 80
Discharge: HOME | End: 2020-03-13
Admitting: INTERNAL MEDICINE

## 2020-03-13 VITALS — TEMPERATURE: 98 F | WEIGHT: 200 LBS | BODY MASS INDEX: 32.14 KG/M2 | RESPIRATION RATE: 18 BRPM | HEIGHT: 66 IN

## 2020-03-13 DIAGNOSIS — I27.82 OTHER CHRONIC PULMONARY EMBOLISM WITHOUT ACUTE COR PULMONALE (HCC): ICD-10-CM

## 2020-03-13 DIAGNOSIS — Z79.01 LONG TERM (CURRENT) USE OF ANTICOAGULANTS: Primary | ICD-10-CM

## 2020-03-13 DIAGNOSIS — I82.220 IVC THROMBOSIS (HCC): ICD-10-CM

## 2020-03-13 LAB — INR PPP: 2.5

## 2020-03-13 PROCEDURE — 85610 PROTHROMBIN TIME: CPT

## 2020-03-13 PROCEDURE — 36416 COLLJ CAPILLARY BLOOD SPEC: CPT

## 2020-03-17 RX ORDER — POTASSIUM CHLORIDE 20 MEQ/1
TABLET, EXTENDED RELEASE ORAL
Qty: 90 TABLET | Refills: 0 | Status: SHIPPED | OUTPATIENT
Start: 2020-03-17 | End: 2020-04-06

## 2020-04-06 RX ORDER — ALLOPURINOL 300 MG/1
TABLET ORAL
Qty: 90 TABLET | Refills: 0 | Status: SHIPPED | OUTPATIENT
Start: 2020-04-06 | End: 2020-08-24

## 2020-04-06 RX ORDER — POTASSIUM CHLORIDE 20 MEQ/1
TABLET, EXTENDED RELEASE ORAL
Qty: 90 TABLET | Refills: 0 | Status: SHIPPED | OUTPATIENT
Start: 2020-04-06 | End: 2020-10-08

## 2020-04-06 RX ORDER — TRIAMTERENE AND HYDROCHLOROTHIAZIDE 75; 50 MG/1; MG/1
TABLET ORAL
Qty: 90 TABLET | Refills: 0 | Status: SHIPPED | OUTPATIENT
Start: 2020-04-06 | End: 2020-08-24

## 2020-04-06 RX ORDER — ATORVASTATIN CALCIUM 10 MG/1
TABLET, FILM COATED ORAL
Qty: 90 TABLET | Refills: 0 | Status: SHIPPED | OUTPATIENT
Start: 2020-04-06 | End: 2020-08-24

## 2020-04-06 RX ORDER — WARFARIN SODIUM 1 MG/1
TABLET ORAL
Qty: 30 TABLET | Refills: 0 | Status: SHIPPED | OUTPATIENT
Start: 2020-04-06 | End: 2020-10-08

## 2020-04-09 ENCOUNTER — TELEPHONE (OUTPATIENT)
Dept: ONCOLOGY | Facility: CLINIC | Age: 80
End: 2020-04-09

## 2020-04-09 DIAGNOSIS — Z79.01 ENCOUNTER FOR MONITORING COUMADIN THERAPY: ICD-10-CM

## 2020-04-09 DIAGNOSIS — Z51.81 ENCOUNTER FOR MONITORING COUMADIN THERAPY: ICD-10-CM

## 2020-04-09 RX ORDER — WARFARIN SODIUM 3 MG/1
TABLET ORAL
Qty: 30 TABLET | Refills: 3 | Status: SHIPPED | OUTPATIENT
Start: 2020-04-09 | End: 2020-07-15 | Stop reason: SDUPTHER

## 2020-04-09 NOTE — TELEPHONE ENCOUNTER
Val from [facility]    Warfarin 3 mg tablets.  She has questions about this prescription.    [phone]

## 2020-04-14 ENCOUNTER — TELEPHONE (OUTPATIENT)
Dept: ONCOLOGY | Facility: HOSPITAL | Age: 80
End: 2020-04-14

## 2020-04-20 ENCOUNTER — TELEPHONE (OUTPATIENT)
Dept: ONCOLOGY | Facility: HOSPITAL | Age: 80
End: 2020-04-20

## 2020-04-20 NOTE — TELEPHONE ENCOUNTER
Case Management/ Note    Patient Name: Adelso Lynne  YOB: 1940  MRN #: [id number]    OSW called patient at the request of EVELIA Talley. Patient is alert and oriented to person, place and time. She states her granddaughter takes her to appointments and won't at this time due to COVID-19. She is open to having a TARC3 application sent to her, completing this and using TARC to get to appointments. OSW will remain available.     Electronically signed by:   Day Parkinson LCSW, OSW-C  04/20/20, 4:34 PM

## 2020-04-21 ENCOUNTER — TELEPHONE (OUTPATIENT)
Dept: ONCOLOGY | Facility: HOSPITAL | Age: 80
End: 2020-04-21

## 2020-04-21 NOTE — TELEPHONE ENCOUNTER
Case Management/ Note    Patient Name: Adelso Lynne  YOB: 1940  MRN #: [id number]    OSW sent TARC3 application to interoffice mail- to be mailed to patient.       Electronically signed by:   Day Parkinson LCSW, OSW-C  04/21/20, 3:07 PM

## 2020-05-12 ENCOUNTER — TELEPHONE (OUTPATIENT)
Dept: ONCOLOGY | Facility: HOSPITAL | Age: 80
End: 2020-05-12

## 2020-05-12 NOTE — TELEPHONE ENCOUNTER
Case Management/ Note    Patient Name: Adelso Lynne  YOB: 1940  MRN #: [id number]    OSW received a call from patient who said she did not make it to her last INR appt and would like to schedule another appt. She was told Eusebia  would call her.     Electronically signed by:   Day Parkinson LCSW, OSW-C  05/12/20, 3:09 PM

## 2020-05-14 ENCOUNTER — LAB (OUTPATIENT)
Dept: LAB | Facility: HOSPITAL | Age: 80
End: 2020-05-14

## 2020-05-14 ENCOUNTER — HOSPITAL ENCOUNTER (OUTPATIENT)
Dept: ONCOLOGY | Facility: HOSPITAL | Age: 80
Discharge: HOME | End: 2020-05-14
Admitting: INTERNAL MEDICINE

## 2020-05-14 VITALS
HEART RATE: 92 BPM | SYSTOLIC BLOOD PRESSURE: 126 MMHG | BODY MASS INDEX: 32.78 KG/M2 | RESPIRATION RATE: 18 BRPM | WEIGHT: 204 LBS | TEMPERATURE: 98.6 F | HEIGHT: 66 IN | DIASTOLIC BLOOD PRESSURE: 61 MMHG

## 2020-05-14 DIAGNOSIS — Z51.81 ENCOUNTER FOR MONITORING COUMADIN THERAPY: Primary | ICD-10-CM

## 2020-05-14 DIAGNOSIS — Z79.01 ENCOUNTER FOR MONITORING COUMADIN THERAPY: Primary | ICD-10-CM

## 2020-05-14 DIAGNOSIS — I82.220 IVC THROMBOSIS (HCC): ICD-10-CM

## 2020-05-14 DIAGNOSIS — I27.82 OTHER CHRONIC PULMONARY EMBOLISM WITHOUT ACUTE COR PULMONALE (HCC): ICD-10-CM

## 2020-05-14 LAB — INR PPP: 2.3

## 2020-05-14 PROCEDURE — 36416 COLLJ CAPILLARY BLOOD SPEC: CPT

## 2020-05-14 PROCEDURE — 85610 PROTHROMBIN TIME: CPT

## 2020-05-28 ENCOUNTER — LAB (OUTPATIENT)
Dept: LAB | Facility: HOSPITAL | Age: 80
End: 2020-05-28

## 2020-05-28 ENCOUNTER — OFFICE VISIT (OUTPATIENT)
Dept: ONCOLOGY | Facility: CLINIC | Age: 80
End: 2020-05-28

## 2020-05-28 ENCOUNTER — HOSPITAL ENCOUNTER (OUTPATIENT)
Dept: ONCOLOGY | Facility: HOSPITAL | Age: 80
Discharge: HOME | End: 2020-05-28
Admitting: INTERNAL MEDICINE

## 2020-05-28 VITALS — TEMPERATURE: 98 F | WEIGHT: 203 LBS | BODY MASS INDEX: 32.62 KG/M2 | RESPIRATION RATE: 18 BRPM | HEIGHT: 66 IN

## 2020-05-28 VITALS — HEIGHT: 66 IN | BODY MASS INDEX: 32.62 KG/M2 | WEIGHT: 203 LBS | RESPIRATION RATE: 18 BRPM

## 2020-05-28 DIAGNOSIS — I27.82 OTHER CHRONIC PULMONARY EMBOLISM WITHOUT ACUTE COR PULMONALE (HCC): Primary | ICD-10-CM

## 2020-05-28 DIAGNOSIS — I82.220 IVC THROMBOSIS (HCC): ICD-10-CM

## 2020-05-28 DIAGNOSIS — I27.82 OTHER CHRONIC PULMONARY EMBOLISM WITHOUT ACUTE COR PULMONALE (HCC): ICD-10-CM

## 2020-05-28 DIAGNOSIS — Z79.01 ENCOUNTER FOR MONITORING COUMADIN THERAPY: Primary | ICD-10-CM

## 2020-05-28 DIAGNOSIS — Z51.81 ENCOUNTER FOR MONITORING COUMADIN THERAPY: Primary | ICD-10-CM

## 2020-05-28 LAB
BASOPHILS # BLD AUTO: 0.04 10*3/MM3 (ref 0–0.2)
BASOPHILS NFR BLD AUTO: 0.5 % (ref 0–1.5)
DEPRECATED RDW RBC AUTO: 47 FL (ref 37–54)
EOSINOPHIL # BLD AUTO: 0.23 10*3/MM3 (ref 0–0.4)
EOSINOPHIL NFR BLD AUTO: 2.8 % (ref 0.3–6.2)
ERYTHROCYTE [DISTWIDTH] IN BLOOD BY AUTOMATED COUNT: 14.3 % (ref 12.3–15.4)
HCT VFR BLD AUTO: 42.2 % (ref 34–46.6)
HGB BLD-MCNC: 14.6 G/DL (ref 12–15.9)
INR PPP: 2
LYMPHOCYTES # BLD AUTO: 3.37 10*3/MM3 (ref 0.7–3.1)
LYMPHOCYTES NFR BLD AUTO: 40.9 % (ref 19.6–45.3)
MCH RBC QN AUTO: 31.7 PG (ref 26.6–33)
MCHC RBC AUTO-ENTMCNC: 34.6 G/DL (ref 31.5–35.7)
MCV RBC AUTO: 91.7 FL (ref 79–97)
MONOCYTES # BLD AUTO: 0.79 10*3/MM3 (ref 0.1–0.9)
MONOCYTES NFR BLD AUTO: 9.6 % (ref 5–12)
NEUTROPHILS # BLD AUTO: 3.8 10*3/MM3 (ref 1.7–7)
NEUTROPHILS NFR BLD AUTO: 46.2 % (ref 42.7–76)
PLATELET # BLD AUTO: 276 10*3/MM3 (ref 140–450)
PMV BLD AUTO: 11.1 FL (ref 6–12)
RBC # BLD AUTO: 4.6 10*6/MM3 (ref 3.77–5.28)
WBC NRBC COR # BLD: 8.23 10*3/MM3 (ref 3.4–10.8)

## 2020-05-28 PROCEDURE — 85610 PROTHROMBIN TIME: CPT

## 2020-05-28 PROCEDURE — 99213 OFFICE O/P EST LOW 20 MIN: CPT | Performed by: INTERNAL MEDICINE

## 2020-05-28 PROCEDURE — 36416 COLLJ CAPILLARY BLOOD SPEC: CPT

## 2020-05-28 PROCEDURE — 85025 COMPLETE CBC W/AUTO DIFF WBC: CPT | Performed by: INTERNAL MEDICINE

## 2020-05-28 RX ORDER — MULTIPLE VITAMINS W/ MINERALS TAB 9MG-400MCG
1 TAB ORAL DAILY
COMMUNITY

## 2020-05-28 NOTE — PROGRESS NOTES
Hematology/Oncology Outpatient Follow Up    PATIENT NAME:Adelso Lynne  :1940  MRN: [id number]  PRIMARY CARE PHYSICIAN: Lyell, Reggie Duane, MD  REFERRING PHYSICIAN: Lyell, Reggie Duane, MD    Chief Complaint   Patient presents with   • Follow-up     thrombophilia        HISTORY OF PRESENT ILLNESS:   This is a 79-year-old female who has a history of pulmonary embolism at the age of 25 [more than 50 years ago].  Subsequently she developed deep venous thrombosis and patient was treated with Warfarin therapy at the time.  She has a strong family  history of blood clots in her mother, brother, son and sister, but there is no known mutation yet identified in the family.  Patient was seen by Amalia Salamanca with [facility] and had an MRI of the abdomen to evaluate elevated liver function tests.  The liver showed evidence of fatty infiltration, but there was no mass identified.  The kidneys were normal.  The spleen did not show any abnormalities, as well as the pancreas.  There was a filling defect noted in the inferior vena cava below the renal veins, thought to represent some partial thrombosis.  The patient was then placed on Lovenox 40 mg subcutaneously daily and then referred for further evaluation and management of her thrombophilia.  Patient denies any new symptoms such as weight loss, night sweats or fatigue.  She is able to carry out her own activities of daily living.  Her last colonoscopy was about three to four years ago and she is up to date on her routine mammograms.  Patient is accompanied by her daughter today for this appointment.      • Since her last visit on 16, patient was initiated on anticoagulation with Warfarin.  Anticardiolipin IgG antibody was slightly high at 24 [normal < 23], beta-2 glycoprotein was negative.  Factor V Leiden was not mutated.  Prothrombin gene was not mutated as well.  PT 12.4, INR 1, PTT 27.  Protein C activity (N) 129.  Antithrombin III activity (N) 99.  Protein S  activity (N) 85%.  • Patient has not had any issues since her last visit.  There are no planned surgeries.          Past Medical History:   Diagnosis Date   • Arthritis    • Deep venous thrombosis (CMS/HCC)    • Hyperlipidemia    • Hypertension    • Pulmonary embolism (CMS/HCC)        Past Surgical History:   Procedure Laterality Date   • ANKLE SURGERY      due to fracture   • CYST REMOVAL      from the left wrist    • HYSTERECTOMY           Current Outpatient Medications:   •  allopurinol (ZYLOPRIM) 300 MG tablet, Take 1 tablet by mouth once daily, Disp: 90 tablet, Rfl: 0  •  atorvastatin (LIPITOR) 10 MG tablet, Take 1 tablet by mouth in the evening, Disp: 90 tablet, Rfl: 0  •  Cholecalciferol (VITAMIN D) 2000 units tablet, Take 2,000 Units by mouth Daily., Disp: , Rfl:   •  Multiple Vitamins-Minerals (MULTIVITAMIN WITH MINERALS) tablet tablet, Take 1 tablet by mouth Daily., Disp: , Rfl:   •  potassium chloride (K-DUR,KLOR-CON) 20 MEQ CR tablet, TAKE 1  BY MOUTH ONCE DAILY, Disp: 90 tablet, Rfl: 0  •  triamterene-hydrochlorothiazide (MAXZIDE) 75-50 MG per tablet, Take 1 tablet by mouth once daily, Disp: 90 tablet, Rfl: 0  •  warfarin (COUMADIN) 1 MG tablet, Take 1 tablet by mouth once daily, Disp: 30 tablet, Rfl: 0  •  warfarin (COUMADIN) 3 MG tablet, TAKE 1 TABLET BY MOUTH ONCE DAILY, Disp: 30 tablet, Rfl: 3    Allergies   Allergen Reactions   • Codeine Shortness Of Breath, Rash and Unknown (See Comments)   • Hydroxychloroquine Rash     Light flashes spider web in right eye and black dot in left eye       Family History   Problem Relation Age of Onset   • Breast cancer Daughter         onset in 40s   • Prostate cancer Son         onset in 60s   • Colon cancer Other        Cancer-related family history includes Breast cancer in her daughter; Colon cancer in an other family member; Prostate cancer in her son.    Social History     Tobacco Use   • Smoking status: Never Smoker   • Smokeless tobacco: Never Used  "  Substance Use Topics   • Alcohol use: Never     Frequency: Never   • Drug use: Never       I have reviewed and confirmed the accuracy of the patient's history: Chief complaint, HPI and ROS as entered by the MA/LPN/RN. Anushka Patel MD 05/28/20     SUBJECTIVE:    Patient is here today for routine follow-up.  Patient has no specific complaints today.  She denies any bleeding issues.  There are no planned surgeries coming up.  She denies fevers, chills, nausea or vomiting.          REVIEW OF SYSTEMS:  Review of Systems   Constitutional: Negative for chills and fever.   HENT: Negative for ear pain, mouth sores, nosebleeds and sore throat.    Eyes: Negative for photophobia and visual disturbance.   Respiratory: Negative for wheezing and stridor.    Cardiovascular: Negative for chest pain and palpitations.   Gastrointestinal: Negative for abdominal pain, diarrhea, nausea and vomiting.   Endocrine: Negative for cold intolerance and heat intolerance.   Genitourinary: Negative for dysuria and hematuria.   Musculoskeletal: Negative for joint swelling and neck stiffness.   Skin: Negative for color change and rash.   Neurological: Negative for seizures and syncope.   Hematological: Negative for adenopathy.        No obvious bleeding   Psychiatric/Behavioral: Negative for agitation, confusion and hallucinations.       OBJECTIVE:    Vitals:    05/28/20 0917   Resp: 18   Temp: 98 °F (36.7 °C)   TempSrc: Oral   Weight: 92.1 kg (203 lb)   Height: 167.6 cm (66\")       ECOG  (1) Restricted in physically strenuous activity, ambulatory and able to do work of light nature    Physical Exam   Constitutional: She is oriented to person, place, and time. No distress.   HENT:   Head: Normocephalic and atraumatic.   Eyes: Conjunctivae and EOM are normal. Right eye exhibits no discharge. Left eye exhibits no discharge. No scleral icterus.   Neck: Normal range of motion. Neck supple. No thyromegaly present.   Cardiovascular: Normal " rate, regular rhythm and normal heart sounds. Exam reveals no gallop and no friction rub.   Pulmonary/Chest: Effort normal. No stridor. No respiratory distress. She has no wheezes.   Abdominal: Soft. Bowel sounds are normal. She exhibits no mass. There is no tenderness. There is no rebound and no guarding.   Musculoskeletal: Normal range of motion. She exhibits no tenderness.   Lymphadenopathy:     She has no cervical adenopathy.   Neurological: She is alert and oriented to person, place, and time. She exhibits normal muscle tone.   Skin: Skin is warm. No rash noted. She is not diaphoretic. No erythema.   Psychiatric: She has a normal mood and affect. Her behavior is normal.   Nursing note and vitals reviewed.    PE as above  RECENT LABS  WBC   Date Value Ref Range Status   05/28/2020 8.23 3.40 - 10.80 10*3/mm3 Final     RBC   Date Value Ref Range Status   05/28/2020 4.60 3.77 - 5.28 10*6/mm3 Final     Hemoglobin   Date Value Ref Range Status   05/28/2020 14.6 12.0 - 15.9 g/dL Final     Hematocrit   Date Value Ref Range Status   05/28/2020 42.2 34.0 - 46.6 % Final     MCV   Date Value Ref Range Status   05/28/2020 91.7 79.0 - 97.0 fL Final     MCH   Date Value Ref Range Status   05/28/2020 31.7 26.6 - 33.0 pg Final     MCHC   Date Value Ref Range Status   05/28/2020 34.6 31.5 - 35.7 g/dL Final     RDW   Date Value Ref Range Status   05/28/2020 14.3 12.3 - 15.4 % Final     RDW-SD   Date Value Ref Range Status   05/28/2020 47.0 37.0 - 54.0 fl Final     MPV   Date Value Ref Range Status   05/28/2020 11.1 6.0 - 12.0 fL Final     Platelets   Date Value Ref Range Status   05/28/2020 276 140 - 450 10*3/mm3 Final     Neutrophil %   Date Value Ref Range Status   05/28/2020 46.2 42.7 - 76.0 % Final     Lymphocyte %   Date Value Ref Range Status   05/28/2020 40.9 19.6 - 45.3 % Final     Monocyte %   Date Value Ref Range Status   05/28/2020 9.6 5.0 - 12.0 % Final     Eosinophil %   Date Value Ref Range Status   05/28/2020 2.8  0.3 - 6.2 % Final     Basophil %   Date Value Ref Range Status   05/28/2020 0.5 0.0 - 1.5 % Final     Neutrophils, Absolute   Date Value Ref Range Status   05/28/2020 3.80 1.70 - 7.00 10*3/mm3 Final     Lymphocytes, Absolute   Date Value Ref Range Status   05/28/2020 3.37 (H) 0.70 - 3.10 10*3/mm3 Final     Monocytes, Absolute   Date Value Ref Range Status   05/28/2020 0.79 0.10 - 0.90 10*3/mm3 Final     Eosinophils, Absolute   Date Value Ref Range Status   05/28/2020 0.23 0.00 - 0.40 10*3/mm3 Final     Basophils, Absolute   Date Value Ref Range Status   05/28/2020 0.04 0.00 - 0.20 10*3/mm3 Final     nRBC   Date Value Ref Range Status   06/22/2019 0.1 0.0 - 0.2 /100 WBC Final       Lab Results   Component Value Date    GLUCOSE 92 01/23/2020    BUN 13 01/23/2020    CREATININE 0.81 01/23/2020    EGFRIFNONA 90 11/17/2017    EGFRIFAFRI 82 01/23/2020    BCR 16.0 01/23/2020    K 3.8 01/23/2020    CO2 30.0 (H) 01/23/2020    CALCIUM 10.1 01/23/2020    ALBUMIN 3.80 01/23/2020    LABIL2 0.8 (L) 02/21/2019    AST 34 (H) 01/23/2020    ALT 30 01/23/2020         Assessment/Plan     Other chronic pulmonary embolism without acute cor pulmonale (CMS/HCC)  - CBC & Differential  - CBC Auto Differential      ASSESSMENT:    1. 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   2. History of pulmonary embolism and deep venous thrombosis in the past.  3. Strong family history of thrombophilia in multiple first degree relatives.   4. Anticardiolipin IgG antibody, slightly elevated at 24.   5. Monitoring Coumadin therapy.  Supratherapeutic today.     PLAN:  Continue INR monitoring in clinic.  Patient will let me know if she has surgery  scheduled for Lovenox bridge.      She will let me know if she develops any bleeding complications.    She will continue to be monitored in our Coumadin clinic.     She is in the process of finding a new primary care physician.  Patient has asked questions which have all been answered to the best of my  abilities.                 I have reviewed labs results, imaging, vitals, and medications with the patient today. Will follow up in 3 months       Patient verbalized understanding and is in agreement of the above plan.

## 2020-06-25 ENCOUNTER — HOSPITAL ENCOUNTER (OUTPATIENT)
Dept: ONCOLOGY | Facility: HOSPITAL | Age: 80
Setting detail: INFUSION SERIES
Discharge: HOME | End: 2020-06-25

## 2020-06-25 ENCOUNTER — LAB (OUTPATIENT)
Dept: LAB | Facility: HOSPITAL | Age: 80
End: 2020-06-25

## 2020-06-25 VITALS
HEIGHT: 66 IN | BODY MASS INDEX: 33.11 KG/M2 | RESPIRATION RATE: 18 BRPM | DIASTOLIC BLOOD PRESSURE: 66 MMHG | WEIGHT: 206 LBS | HEART RATE: 84 BPM | TEMPERATURE: 97.3 F | SYSTOLIC BLOOD PRESSURE: 143 MMHG

## 2020-06-25 DIAGNOSIS — Z79.01 LONG TERM (CURRENT) USE OF ANTICOAGULANTS: Primary | ICD-10-CM

## 2020-06-25 DIAGNOSIS — I82.220 IVC THROMBOSIS (HCC): ICD-10-CM

## 2020-06-25 DIAGNOSIS — I27.82 OTHER CHRONIC PULMONARY EMBOLISM WITHOUT ACUTE COR PULMONALE (HCC): ICD-10-CM

## 2020-06-25 LAB — INR PPP: 2.4

## 2020-06-25 PROCEDURE — 36416 COLLJ CAPILLARY BLOOD SPEC: CPT

## 2020-06-25 PROCEDURE — 85610 PROTHROMBIN TIME: CPT

## 2020-07-15 ENCOUNTER — TELEPHONE (OUTPATIENT)
Dept: ONCOLOGY | Facility: CLINIC | Age: 80
End: 2020-07-15

## 2020-07-15 DIAGNOSIS — Z79.01 ENCOUNTER FOR MONITORING COUMADIN THERAPY: ICD-10-CM

## 2020-07-15 DIAGNOSIS — Z51.81 ENCOUNTER FOR MONITORING COUMADIN THERAPY: ICD-10-CM

## 2020-07-15 RX ORDER — WARFARIN SODIUM 3 MG/1
TABLET ORAL
Qty: 30 TABLET | Refills: 3 | Status: SHIPPED | OUTPATIENT
Start: 2020-07-15 | End: 2020-07-17 | Stop reason: SDUPTHER

## 2020-07-15 NOTE — TELEPHONE ENCOUNTER
ELVIRA WITH [facility] SENT A REFILL REQUEST BUT NO RESPONSE. NO NOTES IN FILE.    PLEASE NOTE PHARMACY AND DISPENSE CHANGE.    DR CLARKE   WARFARIN 3 MG TABLET  TAKE 1 TABLET BY MOUTH ONCE DAILY  [facility]   [address] [facility]- [phone]  90 TABLETS

## 2020-07-17 DIAGNOSIS — Z79.01 ENCOUNTER FOR MONITORING COUMADIN THERAPY: ICD-10-CM

## 2020-07-17 DIAGNOSIS — Z51.81 ENCOUNTER FOR MONITORING COUMADIN THERAPY: ICD-10-CM

## 2020-07-17 RX ORDER — WARFARIN SODIUM 3 MG/1
TABLET ORAL
Qty: 30 TABLET | Refills: 3 | Status: SHIPPED | OUTPATIENT
Start: 2020-07-17 | End: 2020-07-20 | Stop reason: SDUPTHER

## 2020-07-20 ENCOUNTER — TELEPHONE (OUTPATIENT)
Dept: ONCOLOGY | Facility: CLINIC | Age: 80
End: 2020-07-20

## 2020-07-20 DIAGNOSIS — Z79.01 ENCOUNTER FOR MONITORING COUMADIN THERAPY: ICD-10-CM

## 2020-07-20 DIAGNOSIS — Z51.81 ENCOUNTER FOR MONITORING COUMADIN THERAPY: ICD-10-CM

## 2020-07-20 RX ORDER — WARFARIN SODIUM 3 MG/1
TABLET ORAL
Qty: 30 TABLET | Refills: 3 | Status: SHIPPED | OUTPATIENT
Start: 2020-07-20 | End: 2020-11-19 | Stop reason: SDUPTHER

## 2020-07-20 NOTE — TELEPHONE ENCOUNTER
Val from [facility] called for  refill on     Warfarin 3 mg tablet  1 tablet daily     This information for Pharmacy is different than what is in the system    [facility]   [address] [place]  Phone [phone]  Fax [phone]

## 2020-07-23 ENCOUNTER — HOSPITAL ENCOUNTER (OUTPATIENT)
Dept: ONCOLOGY | Facility: HOSPITAL | Age: 80
Setting detail: INFUSION SERIES
Discharge: HOME | End: 2020-07-23

## 2020-07-23 ENCOUNTER — LAB (OUTPATIENT)
Dept: LAB | Facility: HOSPITAL | Age: 80
End: 2020-07-23

## 2020-07-23 DIAGNOSIS — Z79.01 LONG TERM (CURRENT) USE OF ANTICOAGULANTS: Primary | ICD-10-CM

## 2020-07-23 DIAGNOSIS — I27.82 OTHER CHRONIC PULMONARY EMBOLISM WITHOUT ACUTE COR PULMONALE (HCC): ICD-10-CM

## 2020-07-23 DIAGNOSIS — I82.220 IVC THROMBOSIS (HCC): ICD-10-CM

## 2020-07-23 LAB — INR PPP: 2

## 2020-07-23 PROCEDURE — 85610 PROTHROMBIN TIME: CPT

## 2020-07-23 PROCEDURE — 36416 COLLJ CAPILLARY BLOOD SPEC: CPT

## 2020-08-21 NOTE — PROGRESS NOTES
Hematology/Oncology Outpatient Follow Up    PATIENT NAME:Adelso Lynne  :1940  MRN: [id number]  PRIMARY CARE PHYSICIAN: Lyell, Reggie Duane, MD  REFERRING PHYSICIAN: Lyell, Reggie Duane, MD    Chief Complaint   Patient presents with   • Follow-up     other chronic pulmonary embolism without acute cor pulmonale        HISTORY OF PRESENT ILLNESS:     This is a 79-year-old female who has a history of pulmonary embolism at the age of 25 [more than 50 years ago].  Subsequently she developed deep venous thrombosis and patient was treated with Warfarin therapy at the time.  She has a strong family  history of blood clots in her mother, brother, son and sister, but there is no known mutation yet identified in the family.  Patient was seen by Amalia Salamanca with [facility] and had an MRI of the abdomen to evaluate elevated liver function tests.  The liver showed evidence of fatty infiltration, but there was no mass identified.  The kidneys were normal.  The spleen did not show any abnormalities, as well as the pancreas.  There was a filling defect noted in the inferior vena cava below the renal veins, thought to represent some partial thrombosis.  The patient was then placed on Lovenox 40 mg subcutaneously daily and then referred for further evaluation and management of her thrombophilia.  Patient denies any new symptoms such as weight loss, night sweats or fatigue.  She is able to carry out her own activities of daily living.  Her last colonoscopy was about three to four years ago and she is up to date on her routine mammograms.  Patient is accompanied by her daughter today for this appointment.      • Since her last visit on 16, patient was initiated on anticoagulation with Warfarin.  Anticardiolipin IgG antibody was slightly high at 24 [normal < 23], beta-2 glycoprotein was negative.  Factor V Leiden was not mutated.  Prothrombin gene was not mutated as well.  PT 12.4, INR 1, PTT 27.  Protein C activity (N) 129.   Antithrombin III activity (N) 99.  Protein S activity (N) 85%.  • Patient has no changes to her HPI since the last visit       Past Medical History:   Diagnosis Date   • Arthritis    • Deep venous thrombosis (CMS/HCC)    • Hyperlipidemia    • Hypertension    • Pulmonary embolism (CMS/HCC)        Past Surgical History:   Procedure Laterality Date   • ANKLE SURGERY      due to fracture   • CYST REMOVAL      from the left wrist    • HYSTERECTOMY           Current Outpatient Medications:   •  allopurinol (ZYLOPRIM) 300 MG tablet, Take 1 tablet by mouth once daily, Disp: 90 tablet, Rfl: 0  •  atorvastatin (LIPITOR) 10 MG tablet, Take 1 tablet by mouth in the evening, Disp: 90 tablet, Rfl: 0  •  Cholecalciferol (VITAMIN D) 2000 units tablet, Take 2,000 Units by mouth Daily., Disp: , Rfl:   •  Multiple Vitamins-Minerals (MULTIVITAMIN WITH MINERALS) tablet tablet, Take 1 tablet by mouth Daily., Disp: , Rfl:   •  potassium chloride (K-DUR,KLOR-CON) 20 MEQ CR tablet, TAKE 1  BY MOUTH ONCE DAILY, Disp: 90 tablet, Rfl: 0  •  triamterene-hydrochlorothiazide (MAXZIDE) 75-50 MG per tablet, Take 1 tablet by mouth once daily, Disp: 90 tablet, Rfl: 0  •  warfarin (COUMADIN) 1 MG tablet, Take 1 tablet by mouth once daily, Disp: 30 tablet, Rfl: 0  •  warfarin (COUMADIN) 3 MG tablet, TAKE 1 TABLET BY MOUTH ONCE DAILY, Disp: 30 tablet, Rfl: 3    Allergies   Allergen Reactions   • Codeine Shortness Of Breath, Rash and Unknown (See Comments)   • Hydroxychloroquine Rash     Light flashes spider web in right eye and black dot in left eye       Family History   Problem Relation Age of Onset   • Breast cancer Daughter         onset in 40s   • Prostate cancer Son         onset in 60s   • Colon cancer Other        Cancer-related family history includes Breast cancer in her daughter; Colon cancer in an other family member; Prostate cancer in her son.    Social History     Tobacco Use   • Smoking status: Never Smoker   • Smokeless tobacco: Never  "Used   Substance Use Topics   • Alcohol use: Never     Frequency: Never   • Drug use: Never       I have reviewed and confirmed the accuracy of the patient's history: Chief complaint, HPI and ROS as entered by the MA/LPN/RN. Anushka Patel MD 08/27/20     SUBJECTIVE:    Patient is here today for routine follow-up.  Remains on warfarin.  She denies any bleeding issues.  There are no planned surgeries.  She complains of right lower extremity swelling more than the left.  She denies pain associated with dates.        REVIEW OF SYSTEMS:  Review of Systems   Constitutional: Negative for chills and fever.   HENT: Negative for ear pain, mouth sores, nosebleeds and sore throat.    Eyes: Negative for photophobia and visual disturbance.   Respiratory: Negative for wheezing and stridor.    Cardiovascular: Negative for chest pain and palpitations.   Gastrointestinal: Negative for abdominal pain, diarrhea, nausea and vomiting.   Endocrine: Negative for cold intolerance and heat intolerance.   Genitourinary: Negative for dysuria and hematuria.   Musculoskeletal: Negative for joint swelling and neck stiffness.        Right lower extremity swelling   Skin: Negative for color change and rash.   Neurological: Negative for seizures and syncope.   Hematological: Negative for adenopathy.        No obvious bleeding   Psychiatric/Behavioral: Negative for agitation, confusion and hallucinations.       OBJECTIVE:    Vitals:    08/27/20 0936   BP: 116/69   Pulse: 89   Resp: 20   Temp: 97.8 °F (36.6 °C)   TempSrc: Temporal   Weight: 94.8 kg (209 lb)   Height: 167.6 cm (66\")   PainSc: 0-No pain       ECOG  (1) Restricted in physically strenuous activity, ambulatory and able to do work of light nature    Physical Exam   Constitutional: She is oriented to person, place, and time. No distress.   HENT:   Head: Normocephalic and atraumatic.   Eyes: Conjunctivae and EOM are normal. Right eye exhibits no discharge. Left eye exhibits no " discharge. No scleral icterus.   Neck: Normal range of motion. Neck supple. No thyromegaly present.   Cardiovascular: Normal rate, regular rhythm and normal heart sounds. Exam reveals no gallop and no friction rub.   Pulmonary/Chest: Effort normal. No stridor. No respiratory distress. She has no wheezes.   Abdominal: Soft. Bowel sounds are normal. She exhibits no mass. There is no tenderness. There is no rebound and no guarding.   Musculoskeletal: Normal range of motion. She exhibits edema. She exhibits no tenderness.   Right lower extremity   Lymphadenopathy:     She has no cervical adenopathy.   Neurological: She is alert and oriented to person, place, and time. She exhibits normal muscle tone.   Skin: Skin is warm. No rash noted. She is not diaphoretic. No erythema.   Psychiatric: She has a normal mood and affect. Her behavior is normal.   Nursing note and vitals reviewed.      RECENT LABS  WBC   Date Value Ref Range Status   08/27/2020 8.43 3.40 - 10.80 10*3/mm3 Final     RBC   Date Value Ref Range Status   08/27/2020 4.62 3.77 - 5.28 10*6/mm3 Final     Hemoglobin   Date Value Ref Range Status   08/27/2020 14.5 12.0 - 15.9 g/dL Final     Hematocrit   Date Value Ref Range Status   08/27/2020 43.2 34.0 - 46.6 % Final     MCV   Date Value Ref Range Status   08/27/2020 93.5 79.0 - 97.0 fL Final     MCH   Date Value Ref Range Status   08/27/2020 31.4 26.6 - 33.0 pg Final     MCHC   Date Value Ref Range Status   08/27/2020 33.6 31.5 - 35.7 g/dL Final     RDW   Date Value Ref Range Status   08/27/2020 14.7 12.3 - 15.4 % Final     RDW-SD   Date Value Ref Range Status   08/27/2020 48.5 37.0 - 54.0 fl Final     MPV   Date Value Ref Range Status   08/27/2020 9.3 6.0 - 12.0 fL Final     Platelets   Date Value Ref Range Status   08/27/2020 299 140 - 450 10*3/mm3 Final     Neutrophil %   Date Value Ref Range Status   08/27/2020 44.7 42.7 - 76.0 % Final     Lymphocyte %   Date Value Ref Range Status   08/27/2020 42.7 19.6 -  45.3 % Final     Monocyte %   Date Value Ref Range Status   08/27/2020 9.8 5.0 - 12.0 % Final     Eosinophil %   Date Value Ref Range Status   08/27/2020 2.4 0.3 - 6.2 % Final     Basophil %   Date Value Ref Range Status   08/27/2020 0.4 0.0 - 1.5 % Final     Neutrophils, Absolute   Date Value Ref Range Status   08/27/2020 3.77 1.70 - 7.00 10*3/mm3 Final     Lymphocytes, Absolute   Date Value Ref Range Status   08/27/2020 3.60 (H) 0.70 - 3.10 10*3/mm3 Final     Monocytes, Absolute   Date Value Ref Range Status   08/27/2020 0.83 0.10 - 0.90 10*3/mm3 Final     Eosinophils, Absolute   Date Value Ref Range Status   08/27/2020 0.20 0.00 - 0.40 10*3/mm3 Final     Basophils, Absolute   Date Value Ref Range Status   08/27/2020 0.03 0.00 - 0.20 10*3/mm3 Final     nRBC   Date Value Ref Range Status   06/22/2019 0.1 0.0 - 0.2 /100 WBC Final       Lab Results   Component Value Date    GLUCOSE 92 01/23/2020    BUN 13 01/23/2020    CREATININE 0.81 01/23/2020    EGFRIFNONA 90 11/17/2017    EGFRIFAFRI 82 01/23/2020    BCR 16.0 01/23/2020    K 3.8 01/23/2020    CO2 30.0 (H) 01/23/2020    CALCIUM 10.1 01/23/2020    ALBUMIN 3.80 01/23/2020    LABIL2 0.8 (L) 02/21/2019    AST 34 (H) 01/23/2020    ALT 30 01/23/2020         Assessment/Plan     IVC thrombosis (CMS/HCC)  - CBC & Differential    Long term (current) use of anticoagulants  - CBC & Differential      ASSESSMENT:    1. Right lower extremity swelling rule out blood clots  2. 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  Ongoing management  3. History of pulmonary embolism and deep venous thrombosis in the past.  Ongoing management  4. Strong family history of thrombophilia in multiple first degree relatives.   5. Anticardiolipin IgG antibody, slightly elevated at 24.   6. Monitoring Coumadin therapy.    Therapeutic today    PLANS:    Stat Doppler study of the lower extremities rule out clot.  If negative continue warfarin.  Also advised patient to follow-up with her primary care  physician  Continue INR monitoring in clinic.  Patient will let me know if she has surgery  scheduled for Lovenox bridge.   Reviewed with her today  She will let me know if she develops any bleeding complications.  Reviewed today   She will continue to be monitored in our Coumadin clinic which will be due in 1 month.     She is in the process of finding a new primary care physician.  Patient has asked questions which have all been answered to the best of my abilities.                 I have reviewed labs results, imaging, vitals, and medications with the patient today. Will follow up in 3 months       Patient verbalized understanding and is in agreement of the above plan.    I spent 30 total minutes, face-to-face, caring for Adelso today.  90% of this time involved counseling and/or coordination of care as documented within this note.

## 2020-08-24 RX ORDER — TRIAMTERENE AND HYDROCHLOROTHIAZIDE 75; 50 MG/1; MG/1
TABLET ORAL
Qty: 90 TABLET | Refills: 0 | Status: SHIPPED | OUTPATIENT
Start: 2020-08-24 | End: 2020-11-30

## 2020-08-24 RX ORDER — ALLOPURINOL 300 MG/1
TABLET ORAL
Qty: 90 TABLET | Refills: 0 | Status: SHIPPED | OUTPATIENT
Start: 2020-08-24 | End: 2020-11-30

## 2020-08-24 RX ORDER — ATORVASTATIN CALCIUM 10 MG/1
TABLET, FILM COATED ORAL
Qty: 90 TABLET | Refills: 0 | Status: SHIPPED | OUTPATIENT
Start: 2020-08-24 | End: 2020-11-01 | Stop reason: SDUPTHER

## 2020-08-25 DIAGNOSIS — E78.01 FAMILIAL HYPERCHOLESTEROLEMIA: Primary | ICD-10-CM

## 2020-08-25 DIAGNOSIS — I10 ESSENTIAL HYPERTENSION: ICD-10-CM

## 2020-08-27 ENCOUNTER — TELEPHONE (OUTPATIENT)
Dept: ONCOLOGY | Facility: CLINIC | Age: 80
End: 2020-08-27

## 2020-08-27 ENCOUNTER — LAB (OUTPATIENT)
Dept: LAB | Facility: HOSPITAL | Age: 80
End: 2020-08-27

## 2020-08-27 ENCOUNTER — OFFICE VISIT (OUTPATIENT)
Dept: ONCOLOGY | Facility: CLINIC | Age: 80
End: 2020-08-27

## 2020-08-27 ENCOUNTER — HOSPITAL ENCOUNTER (OUTPATIENT)
Dept: CARDIOLOGY | Facility: HOSPITAL | Age: 80
Discharge: HOME | End: 2020-08-27
Admitting: INTERNAL MEDICINE

## 2020-08-27 VITALS
SYSTOLIC BLOOD PRESSURE: 116 MMHG | RESPIRATION RATE: 20 BRPM | WEIGHT: 209 LBS | HEIGHT: 66 IN | TEMPERATURE: 97.8 F | HEART RATE: 89 BPM | DIASTOLIC BLOOD PRESSURE: 69 MMHG | BODY MASS INDEX: 33.59 KG/M2

## 2020-08-27 DIAGNOSIS — Z79.01 LONG TERM (CURRENT) USE OF ANTICOAGULANTS: ICD-10-CM

## 2020-08-27 DIAGNOSIS — I82.220 IVC THROMBOSIS (HCC): ICD-10-CM

## 2020-08-27 DIAGNOSIS — I82.220 IVC THROMBOSIS (HCC): Primary | ICD-10-CM

## 2020-08-27 DIAGNOSIS — R60.1 GENERALIZED EDEMA: ICD-10-CM

## 2020-08-27 DIAGNOSIS — I26.99 OTHER PULMONARY EMBOLISM WITHOUT ACUTE COR PULMONALE, UNSPECIFIED CHRONICITY (HCC): Primary | ICD-10-CM

## 2020-08-27 LAB
BASOPHILS # BLD AUTO: 0.03 10*3/MM3 (ref 0–0.2)
BASOPHILS NFR BLD AUTO: 0.4 % (ref 0–1.5)
BH CV LOW VAS LEFT COMMON FEMORAL SPONT: 1
BH CV LOW VAS LEFT GASTRONEMIUS VESSEL: 1
BH CV LOW VAS LEFT MID FEMORAL SPONT: 1
BH CV LOW VAS LEFT PROFUNDA FEMORAL SPONT: 1
BH CV LOW VAS LEFT PROXIMAL FEMORAL SPONT: 1
BH CV LOWER VASCULAR LEFT COMMON FEMORAL AUGMENT: NORMAL
BH CV LOWER VASCULAR LEFT COMMON FEMORAL COMPETENT: NORMAL
BH CV LOWER VASCULAR LEFT COMMON FEMORAL COMPRESS: NORMAL
BH CV LOWER VASCULAR LEFT COMMON FEMORAL PHASIC: NORMAL
BH CV LOWER VASCULAR LEFT COMMON FEMORAL SPONT: NORMAL
BH CV LOWER VASCULAR LEFT COMMON FEMORAL THROMBUS: NORMAL
BH CV LOWER VASCULAR LEFT DISTAL FEMORAL COMPRESS: NORMAL
BH CV LOWER VASCULAR LEFT GASTRONEMIUS COMPRESS: NORMAL
BH CV LOWER VASCULAR LEFT GASTRONEMIUS THROMBUS: NORMAL
BH CV LOWER VASCULAR LEFT GREATER SAPH AK COMPRESS: NORMAL
BH CV LOWER VASCULAR LEFT GREATER SAPH BK COMPRESS: NORMAL
BH CV LOWER VASCULAR LEFT LESSER SAPH COMPRESS: NORMAL
BH CV LOWER VASCULAR LEFT MID FEMORAL AUGMENT: NORMAL
BH CV LOWER VASCULAR LEFT MID FEMORAL COMPETENT: NORMAL
BH CV LOWER VASCULAR LEFT MID FEMORAL COMPRESS: NORMAL
BH CV LOWER VASCULAR LEFT MID FEMORAL PHASIC: NORMAL
BH CV LOWER VASCULAR LEFT MID FEMORAL SPONT: NORMAL
BH CV LOWER VASCULAR LEFT MID FEMORAL THROMBUS: NORMAL
BH CV LOWER VASCULAR LEFT PERONEAL COMPRESS: NORMAL
BH CV LOWER VASCULAR LEFT POPLITEAL AUGMENT: NORMAL
BH CV LOWER VASCULAR LEFT POPLITEAL COMPETENT: NORMAL
BH CV LOWER VASCULAR LEFT POPLITEAL COMPRESS: NORMAL
BH CV LOWER VASCULAR LEFT POPLITEAL PHASIC: NORMAL
BH CV LOWER VASCULAR LEFT POPLITEAL SPONT: NORMAL
BH CV LOWER VASCULAR LEFT POSTERIOR TIBIAL COMPRESS: NORMAL
BH CV LOWER VASCULAR LEFT PROFUNDA FEMORAL COMPRESS: NORMAL
BH CV LOWER VASCULAR LEFT PROFUNDA FEMORAL THROMBUS: NORMAL
BH CV LOWER VASCULAR LEFT PROXIMAL FEMORAL AUGMENT: NORMAL
BH CV LOWER VASCULAR LEFT PROXIMAL FEMORAL COMPETENT: NORMAL
BH CV LOWER VASCULAR LEFT PROXIMAL FEMORAL COMPRESS: NORMAL
BH CV LOWER VASCULAR LEFT PROXIMAL FEMORAL PHASIC: NORMAL
BH CV LOWER VASCULAR LEFT PROXIMAL FEMORAL SPONT: NORMAL
BH CV LOWER VASCULAR LEFT PROXIMAL FEMORAL THROMBUS: NORMAL
BH CV LOWER VASCULAR LEFT SAPHENOFEMORAL JUNCTION COMPRESS: NORMAL
BH CV LOWER VASCULAR RIGHT COMMON FEMORAL AUGMENT: NORMAL
BH CV LOWER VASCULAR RIGHT COMMON FEMORAL COMPETENT: NORMAL
BH CV LOWER VASCULAR RIGHT COMMON FEMORAL COMPRESS: NORMAL
BH CV LOWER VASCULAR RIGHT COMMON FEMORAL PHASIC: NORMAL
BH CV LOWER VASCULAR RIGHT COMMON FEMORAL SPONT: NORMAL
BH CV LOWER VASCULAR RIGHT DISTAL FEMORAL COMPRESS: NORMAL
BH CV LOWER VASCULAR RIGHT GASTRONEMIUS COMPRESS: NORMAL
BH CV LOWER VASCULAR RIGHT GREATER SAPH AK COMPRESS: NORMAL
BH CV LOWER VASCULAR RIGHT GREATER SAPH BK COMPRESS: NORMAL
BH CV LOWER VASCULAR RIGHT LESSER SAPH COMPRESS: NORMAL
BH CV LOWER VASCULAR RIGHT MID FEMORAL AUGMENT: NORMAL
BH CV LOWER VASCULAR RIGHT MID FEMORAL COMPETENT: NORMAL
BH CV LOWER VASCULAR RIGHT MID FEMORAL COMPRESS: NORMAL
BH CV LOWER VASCULAR RIGHT MID FEMORAL PHASIC: NORMAL
BH CV LOWER VASCULAR RIGHT MID FEMORAL SPONT: NORMAL
BH CV LOWER VASCULAR RIGHT PERONEAL COMPRESS: NORMAL
BH CV LOWER VASCULAR RIGHT POPLITEAL AUGMENT: NORMAL
BH CV LOWER VASCULAR RIGHT POPLITEAL COMPETENT: NORMAL
BH CV LOWER VASCULAR RIGHT POPLITEAL COMPRESS: NORMAL
BH CV LOWER VASCULAR RIGHT POPLITEAL PHASIC: NORMAL
BH CV LOWER VASCULAR RIGHT POPLITEAL SPONT: NORMAL
BH CV LOWER VASCULAR RIGHT POSTERIOR TIBIAL COMPRESS: NORMAL
BH CV LOWER VASCULAR RIGHT PROXIMAL FEMORAL COMPRESS: NORMAL
BH CV LOWER VASCULAR RIGHT SAPHENOFEMORAL JUNCTION COMPRESS: NORMAL
DEPRECATED RDW RBC AUTO: 48.5 FL (ref 37–54)
EOSINOPHIL # BLD AUTO: 0.2 10*3/MM3 (ref 0–0.4)
EOSINOPHIL NFR BLD AUTO: 2.4 % (ref 0.3–6.2)
ERYTHROCYTE [DISTWIDTH] IN BLOOD BY AUTOMATED COUNT: 14.7 % (ref 12.3–15.4)
HCT VFR BLD AUTO: 43.2 % (ref 34–46.6)
HGB BLD-MCNC: 14.5 G/DL (ref 12–15.9)
INR PPP: 2.1
LYMPHOCYTES # BLD AUTO: 3.6 10*3/MM3 (ref 0.7–3.1)
LYMPHOCYTES NFR BLD AUTO: 42.7 % (ref 19.6–45.3)
MCH RBC QN AUTO: 31.4 PG (ref 26.6–33)
MCHC RBC AUTO-ENTMCNC: 33.6 G/DL (ref 31.5–35.7)
MCV RBC AUTO: 93.5 FL (ref 79–97)
MONOCYTES # BLD AUTO: 0.83 10*3/MM3 (ref 0.1–0.9)
MONOCYTES NFR BLD AUTO: 9.8 % (ref 5–12)
NEUTROPHILS NFR BLD AUTO: 3.77 10*3/MM3 (ref 1.7–7)
NEUTROPHILS NFR BLD AUTO: 44.7 % (ref 42.7–76)
PLATELET # BLD AUTO: 299 10*3/MM3 (ref 140–450)
PMV BLD AUTO: 9.3 FL (ref 6–12)
RBC # BLD AUTO: 4.62 10*6/MM3 (ref 3.77–5.28)
WBC # BLD AUTO: 8.43 10*3/MM3 (ref 3.4–10.8)

## 2020-08-27 PROCEDURE — 85025 COMPLETE CBC W/AUTO DIFF WBC: CPT

## 2020-08-27 PROCEDURE — 36415 COLL VENOUS BLD VENIPUNCTURE: CPT

## 2020-08-27 PROCEDURE — 93970 EXTREMITY STUDY: CPT

## 2020-08-27 PROCEDURE — 99214 OFFICE O/P EST MOD 30 MIN: CPT | Performed by: INTERNAL MEDICINE

## 2020-08-27 PROCEDURE — 85610 PROTHROMBIN TIME: CPT

## 2020-08-27 NOTE — TELEPHONE ENCOUNTER
I received a call this afternoon from Rosa with vascular lab.  She stated that the patient's bilateral lower extremity doppler was consistent with a positive DVT in the left leg that is chronic.  She stated that there were no acute DVT's.  I let her know that the patient could leave.  Dr. Patel was notified.

## 2020-08-28 ENCOUNTER — TELEPHONE (OUTPATIENT)
Dept: ONCOLOGY | Facility: CLINIC | Age: 80
End: 2020-08-28

## 2020-08-28 NOTE — TELEPHONE ENCOUNTER
Pt is calling to see if we can send a work note for her daughter that was out of work yesterday 8/27/2020 to be caregiver of pt. I will fax note to [phone]. Pt also wanted to speak to Sarah Tanner RN about her doppler results, I attempted to discuss them with her but she would like to speak with Sarah.

## 2020-09-03 ENCOUNTER — TELEPHONE (OUTPATIENT)
Dept: ONCOLOGY | Facility: CLINIC | Age: 80
End: 2020-09-03

## 2020-09-03 NOTE — TELEPHONE ENCOUNTER
Went over results with patient advised that the US showed the chronic DVT.  Advised to stay on coumadin and if there needed to be changes made the provider would advise her

## 2020-09-24 ENCOUNTER — LAB (OUTPATIENT)
Dept: LAB | Facility: HOSPITAL | Age: 80
End: 2020-09-24

## 2020-09-24 ENCOUNTER — HOSPITAL ENCOUNTER (OUTPATIENT)
Dept: ONCOLOGY | Facility: HOSPITAL | Age: 80
Setting detail: INFUSION SERIES
Discharge: HOME | End: 2020-09-24

## 2020-09-24 DIAGNOSIS — Z79.01 LONG TERM (CURRENT) USE OF ANTICOAGULANTS: Primary | ICD-10-CM

## 2020-09-24 DIAGNOSIS — I26.99 OTHER PULMONARY EMBOLISM WITHOUT ACUTE COR PULMONALE, UNSPECIFIED CHRONICITY (HCC): Primary | ICD-10-CM

## 2020-09-24 LAB — INR PPP: 2.1

## 2020-09-24 PROCEDURE — 36416 COLLJ CAPILLARY BLOOD SPEC: CPT

## 2020-09-24 PROCEDURE — 85610 PROTHROMBIN TIME: CPT

## 2020-10-08 RX ORDER — POTASSIUM CHLORIDE 20 MEQ/1
TABLET, EXTENDED RELEASE ORAL
Qty: 90 TABLET | Refills: 0 | Status: SHIPPED | OUTPATIENT
Start: 2020-10-08 | End: 2021-01-05

## 2020-10-08 RX ORDER — WARFARIN SODIUM 1 MG/1
1 TABLET ORAL NIGHTLY
Qty: 30 TABLET | Refills: 5 | Status: SHIPPED | OUTPATIENT
Start: 2020-10-08 | End: 2021-08-11 | Stop reason: SDUPTHER

## 2020-10-22 ENCOUNTER — LAB (OUTPATIENT)
Dept: LAB | Facility: HOSPITAL | Age: 80
End: 2020-10-22

## 2020-10-22 ENCOUNTER — HOSPITAL ENCOUNTER (OUTPATIENT)
Dept: ONCOLOGY | Facility: HOSPITAL | Age: 80
Setting detail: INFUSION SERIES
Discharge: HOME | End: 2020-10-22

## 2020-10-22 DIAGNOSIS — Z79.01 LONG TERM (CURRENT) USE OF ANTICOAGULANTS: Primary | ICD-10-CM

## 2020-10-22 DIAGNOSIS — I26.99 OTHER PULMONARY EMBOLISM WITHOUT ACUTE COR PULMONALE, UNSPECIFIED CHRONICITY (HCC): Primary | ICD-10-CM

## 2020-10-22 LAB — INR PPP: 2.7

## 2020-10-22 PROCEDURE — 36416 COLLJ CAPILLARY BLOOD SPEC: CPT

## 2020-10-22 PROCEDURE — 85610 PROTHROMBIN TIME: CPT

## 2020-11-01 RX ORDER — ATORVASTATIN CALCIUM 10 MG/1
TABLET, FILM COATED ORAL
Qty: 90 TABLET | Refills: 0 | Status: SHIPPED | OUTPATIENT
Start: 2020-11-01 | End: 2021-02-11 | Stop reason: SDUPTHER

## 2020-11-10 NOTE — PROGRESS NOTES
Hematology/Oncology Outpatient Follow Up    PATIENT NAME:Adelso Lynne  :1940  MRN: [id number]  PRIMARY CARE PHYSICIAN: Lyell, Reggie Duane, MD  REFERRING PHYSICIAN: Lyell, Reggie Duane, MD    Chief Complaint   Patient presents with   • Appointment     Encounter for monitoring Coumadin therapy     HISTORY OF PRESENT ILLNESS:   This is an 80 y.o.female who has a history of pulmonary embolism at the age of 25 [more than 50 years ago].  Subsequently she developed deep venous thrombosis and patient was treated with warfarin therapy at the time.  She has a strong family  history of blood clots in her mother, brother, son and sister, but there is no known mutation yet identified in the family.  Patient was seen by Amalia Salamanca with [facility] and had an MRI of the abdomen to evaluate elevated liver function tests.  The liver showed evidence of fatty infiltration, but there was no mass identified.  The kidneys were normal.  The spleen did not show any abnormalities, as well as the pancreas.  There was a filling defect noted in the inferior vena cava below the renal veins, thought to represent some partial thrombosis.  The patient was then placed on Lovenox 40 mg subcutaneously daily and then referred for further evaluation and management of her thrombophilia.  Patient denies any new symptoms such as weight loss, night sweats, or fatigue.  She was able to carry out her own activities of daily living.  Her last colonoscopy was about three to four years ago and she is up to date on her routine mammograms.  Patient was accompanied by her daughter today for that appointment.      • Since her last visit on 16, patient was initiated on anticoagulation with warfarin.  Anticardiolipin IgG antibody was slightly high at 24 [normal < 23], beta-2 glycoprotein was negative.  Factor V Leiden was not mutated.  Prothrombin gene was not mutated as well.  PT 12.4, INR 1, PTT 27.  Protein C activity (N) 129.  Antithrombin III activity  (N) 99.  Protein S activity (N) 85%.  · 8/27/2020  -duplex venous lower extremity bilateral - chronic left lower extremity deep vein thrombosis noted in the common femoral, deep femoral, proximal femoral, mid femoral and gastrocnemius.All other veins appeared normal bilaterally. Imaging reviewed by Anushka Patel MD and patient was continued on warfarin.  INR was therapeutic.   • 11/19/2020 Patient continued on warfarin.     Past Medical History:   Diagnosis Date   • Arthritis    • Deep venous thrombosis (CMS/HCC)    • Hyperlipidemia    • Hypertension    • Pulmonary embolism (CMS/HCC)        Past Surgical History:   Procedure Laterality Date   • ANKLE SURGERY      due to fracture   • CYST REMOVAL      from the left wrist    • HYSTERECTOMY           Current Outpatient Medications:   •  allopurinol (ZYLOPRIM) 300 MG tablet, Take 1 tablet by mouth once daily, Disp: 90 tablet, Rfl: 0  •  atorvastatin (LIPITOR) 10 MG tablet, Take 1 tablet by mouth in the evening, Disp: 90 tablet, Rfl: 0  •  Cholecalciferol (VITAMIN D) 2000 units tablet, Take 2,000 Units by mouth Daily., Disp: , Rfl:   •  Multiple Vitamins-Minerals (MULTIVITAMIN WITH MINERALS) tablet tablet, Take 1 tablet by mouth Daily., Disp: , Rfl:   •  potassium chloride (K-DUR,KLOR-CON) 20 MEQ CR tablet, Take 1 tablet by mouth once daily, Disp: 90 tablet, Rfl: 0  •  triamterene-hydrochlorothiazide (MAXZIDE) 75-50 MG per tablet, Take 1 tablet by mouth once daily, Disp: 90 tablet, Rfl: 0  •  warfarin (COUMADIN) 1 MG tablet, Take 1 tablet by mouth Every Night. At 1700 or as directed., Disp: 30 tablet, Rfl: 5  •  warfarin (COUMADIN) 3 MG tablet, Take 1 tablet by mouth Every Night. At 1700 or as directed, Disp: 90 tablet, Rfl: 1    Allergies   Allergen Reactions   • Codeine Shortness Of Breath, Rash and Unknown (See Comments)   • Hydroxychloroquine Rash     Light flashes spider web in right eye and black dot in left eye       Family History   Problem Relation Age of  Onset   • Breast cancer Daughter         onset in 40s   • Prostate cancer Son         onset in 60s   • Colon cancer Other        Cancer-related family history includes Breast cancer in her daughter; Colon cancer in an other family member; Prostate cancer in her son.    Social History     Tobacco Use   • Smoking status: Never Smoker   • Smokeless tobacco: Never Used   Substance Use Topics   • Alcohol use: Never     Frequency: Never   • Drug use: Never       HPI, ROS and PFSH have been reviewed and confirmed on 11/19/2020.     SUBJECTIVE:  The patient presented for a scheduled follow up appointment.  She continued on warfarin and had her INR checked in this office. She was taking warfarin 3 mg once daily.  She was feeling well.  No surgeries planned.         REVIEW OF SYSTEMS:  Review of Systems   Constitutional: Negative for appetite change, chills, fatigue, fever and unexpected weight change.   HENT: Negative for congestion, hearing loss, mouth sores, nosebleeds, postnasal drip, rhinorrhea and sore throat.    Eyes: Negative for photophobia, pain and visual disturbance.   Respiratory: Negative for cough, chest tightness, shortness of breath and wheezing.    Cardiovascular: Negative for chest pain, palpitations and leg swelling ( RLE - chronic ).   Gastrointestinal: Negative for abdominal distention, abdominal pain, constipation, diarrhea, nausea and vomiting.   Genitourinary: Negative for difficulty urinating, dysuria and hematuria.   Musculoskeletal: Positive for gait problem ( uses cane ). Negative for arthralgias, back pain and myalgias.   Skin: Negative for pallor and rash.   Neurological: Negative for dizziness, weakness, light-headedness, numbness and headaches.   Hematological: Negative for adenopathy. Does not bruise/bleed easily.   Psychiatric/Behavioral: Negative for agitation, confusion and dysphoric mood. The patient is not nervous/anxious.    All other systems reviewed and are  "negative.    OBJECTIVE:  Vitals:    11/19/20 1058   BP: 128/73   Pulse: 98   Resp: 16   Temp: 97.1 °F (36.2 °C)   Weight: 97.5 kg (215 lb)   Height: 167.6 cm (66\")   PainSc: 0-No pain     ECOG  (1) Restricted in physically strenuous activity, ambulatory and able to do work of light nature    Physical Exam   Constitutional: She is oriented to person, place, and time. No distress.   HENT:   Head: Normocephalic and atraumatic.   Eyes: Conjunctivae are normal. Right eye exhibits no discharge. Left eye exhibits no discharge. No scleral icterus.   Neck: Normal range of motion. Neck supple. No thyromegaly present.   Cardiovascular: Normal rate, regular rhythm and normal heart sounds. Exam reveals no gallop and no friction rub.   Pulmonary/Chest: Effort normal. No stridor. No respiratory distress. She has no wheezes.   Abdominal: Soft. Bowel sounds are normal. She exhibits no mass. There is no abdominal tenderness. There is no rebound and no guarding.   Musculoskeletal: Normal range of motion. No tenderness.      Comments: Right lower extremity   Lymphadenopathy:     She has no cervical adenopathy.   Neurological: She is alert and oriented to person, place, and time. She exhibits normal muscle tone. Gait ( ambulates with cane ) abnormal.   Stooped posture    Skin: Skin is warm. No rash noted. She is not diaphoretic. No erythema.   Psychiatric: Her behavior is normal.   Nursing note and vitals reviewed.    RECENT LABS  WBC   Date Value Ref Range Status   11/19/2020 6.96 3.40 - 10.80 10*3/mm3 Final     RBC   Date Value Ref Range Status   11/19/2020 4.56 3.77 - 5.28 10*6/mm3 Final     Hemoglobin   Date Value Ref Range Status   11/19/2020 14.1 12.0 - 15.9 g/dL Final     Hematocrit   Date Value Ref Range Status   11/19/2020 42.4 34.0 - 46.6 % Final     MCV   Date Value Ref Range Status   11/19/2020 93.0 79.0 - 97.0 fL Final     MCH   Date Value Ref Range Status   11/19/2020 30.9 26.6 - 33.0 pg Final     MCHC   Date Value Ref " Range Status   11/19/2020 33.3 31.5 - 35.7 g/dL Final     RDW   Date Value Ref Range Status   11/19/2020 15.2 12.3 - 15.4 % Final     RDW-SD   Date Value Ref Range Status   11/19/2020 50.3 37.0 - 54.0 fl Final     MPV   Date Value Ref Range Status   11/19/2020 9.1 6.0 - 12.0 fL Final     Platelets   Date Value Ref Range Status   11/19/2020 289 140 - 450 10*3/mm3 Final     Neutrophil %   Date Value Ref Range Status   11/19/2020 44.9 42.7 - 76.0 % Final     Lymphocyte %   Date Value Ref Range Status   11/19/2020 43.8 19.6 - 45.3 % Final     Monocyte %   Date Value Ref Range Status   11/19/2020 8.8 5.0 - 12.0 % Final     Eosinophil %   Date Value Ref Range Status   11/19/2020 2.2 0.3 - 6.2 % Final     Basophil %   Date Value Ref Range Status   11/19/2020 0.3 0.0 - 1.5 % Final     Neutrophils, Absolute   Date Value Ref Range Status   11/19/2020 3.13 1.70 - 7.00 10*3/mm3 Final     Lymphocytes, Absolute   Date Value Ref Range Status   11/19/2020 3.05 0.70 - 3.10 10*3/mm3 Final     Monocytes, Absolute   Date Value Ref Range Status   11/19/2020 0.61 0.10 - 0.90 10*3/mm3 Final     Eosinophils, Absolute   Date Value Ref Range Status   11/19/2020 0.15 0.00 - 0.40 10*3/mm3 Final     Basophils, Absolute   Date Value Ref Range Status   11/19/2020 0.02 0.00 - 0.20 10*3/mm3 Final     nRBC   Date Value Ref Range Status   06/22/2019 0.1 0.0 - 0.2 /100 WBC Final       Lab Results   Component Value Date    GLUCOSE 92 01/23/2020    BUN 13 01/23/2020    CREATININE 0.81 01/23/2020    EGFRIFNONA 90 11/17/2017    EGFRIFAFRI 82 01/23/2020    BCR 16.0 01/23/2020    K 3.8 01/23/2020    CO2 30.0 (H) 01/23/2020    CALCIUM 10.1 01/23/2020    ALBUMIN 3.80 01/23/2020    LABIL2 0.8 (L) 02/21/2019    AST 34 (H) 01/23/2020    ALT 30 01/23/2020     Assessment/Plan   Long term (current) use of anticoagulants  - CBC & Differential  - Comprehensive Metabolic Panel  - Protime-INR, Fingerstick    Other chronic pulmonary embolism without acute cor pulmonale  (CMS/[facility])    Anticardiolipin antibody positive    Encounter for monitoring Coumadin therapy  - warfarin (COUMADIN) 3 MG tablet    ASSESSMENT:  1. Left lower extremity DVT- 8/27/2020 Chronic left lower extremity deep vein thrombosis noted in the common femoral, deep femoral, proximal femoral, mid femoral and gastrocnemius. All other veins appeared normal bilaterally.  2. 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  Ongoing management.   3. History of pulmonary embolism and deep venous thrombosis in the past.  Ongoing management.   4. Strong family history of thrombophilia in multiple first degree relatives.   5. Anticardiolipin IgG antibody, slightly elevated at 24.   6. Monitoring Coumadin therapy. INR has been stable.  Therapeutic today    PLAN:  1. CBC, CMP, and INR   2. INR monitored in this office  3. Repeat INR in 1 week   4. Change warfarin to 3 mg daily alternating with 4 mg daily   5. Patient to notify the office if she has surgery scheduled and will be bridged with Lovenox  6. Recommended immunization with influenza vaccination - patient has had already   7. Advised to continue social isolation/distancing due to the ongoing coronavirus pandemic.   8. Copy of note sent to Lyell, Reggie Duane, MD per patient request    9. RTC in 3 months with Anushka Patel MD           I have reviewed labs results, imaging, vitals, and medications with the patient today. Will follow up in 3 months     Electronically signed by PERFECTO العلي, 11/19/20, 10:53 AM EST.

## 2020-11-19 ENCOUNTER — LAB (OUTPATIENT)
Dept: LAB | Facility: HOSPITAL | Age: 80
End: 2020-11-19

## 2020-11-19 ENCOUNTER — OFFICE VISIT (OUTPATIENT)
Dept: ONCOLOGY | Facility: CLINIC | Age: 80
End: 2020-11-19

## 2020-11-19 ENCOUNTER — HOSPITAL ENCOUNTER (OUTPATIENT)
Dept: ONCOLOGY | Facility: HOSPITAL | Age: 80
Setting detail: INFUSION SERIES
Discharge: HOME | End: 2020-11-19

## 2020-11-19 VITALS
WEIGHT: 215 LBS | BODY MASS INDEX: 34.55 KG/M2 | HEIGHT: 66 IN | RESPIRATION RATE: 16 BRPM | SYSTOLIC BLOOD PRESSURE: 128 MMHG | TEMPERATURE: 97.1 F | DIASTOLIC BLOOD PRESSURE: 73 MMHG | HEART RATE: 98 BPM

## 2020-11-19 DIAGNOSIS — I27.82 OTHER CHRONIC PULMONARY EMBOLISM WITHOUT ACUTE COR PULMONALE (HCC): ICD-10-CM

## 2020-11-19 DIAGNOSIS — Z79.01 ENCOUNTER FOR MONITORING COUMADIN THERAPY: Primary | ICD-10-CM

## 2020-11-19 DIAGNOSIS — R76.0 ANTICARDIOLIPIN ANTIBODY POSITIVE: ICD-10-CM

## 2020-11-19 DIAGNOSIS — Z79.01 ENCOUNTER FOR MONITORING COUMADIN THERAPY: ICD-10-CM

## 2020-11-19 DIAGNOSIS — Z79.01 LONG TERM (CURRENT) USE OF ANTICOAGULANTS: Primary | ICD-10-CM

## 2020-11-19 DIAGNOSIS — Z79.01 LONG TERM (CURRENT) USE OF ANTICOAGULANTS: ICD-10-CM

## 2020-11-19 DIAGNOSIS — Z51.81 ENCOUNTER FOR MONITORING COUMADIN THERAPY: ICD-10-CM

## 2020-11-19 DIAGNOSIS — Z51.81 ENCOUNTER FOR MONITORING COUMADIN THERAPY: Primary | ICD-10-CM

## 2020-11-19 LAB
ALBUMIN SERPL-MCNC: 3.6 G/DL (ref 3.5–5.2)
ALBUMIN/GLOB SERPL: 1.2 G/DL
ALP SERPL-CCNC: 143 U/L (ref 39–117)
ALT SERPL W P-5'-P-CCNC: 24 U/L (ref 1–33)
ANION GAP SERPL CALCULATED.3IONS-SCNC: 9 MMOL/L (ref 5–15)
AST SERPL-CCNC: 24 U/L (ref 1–32)
BASOPHILS # BLD AUTO: 0.02 10*3/MM3 (ref 0–0.2)
BASOPHILS NFR BLD AUTO: 0.3 % (ref 0–1.5)
BILIRUB SERPL-MCNC: 0.5 MG/DL (ref 0–1.2)
BUN SERPL-MCNC: 11 MG/DL (ref 8–23)
BUN/CREAT SERPL: 15.9 (ref 7–25)
CALCIUM SPEC-SCNC: 9.6 MG/DL (ref 8.6–10.5)
CHLORIDE SERPL-SCNC: 103 MMOL/L (ref 98–107)
CO2 SERPL-SCNC: 30 MMOL/L (ref 22–29)
CREAT SERPL-MCNC: 0.69 MG/DL (ref 0.57–1)
DEPRECATED RDW RBC AUTO: 50.3 FL (ref 37–54)
EOSINOPHIL # BLD AUTO: 0.15 10*3/MM3 (ref 0–0.4)
EOSINOPHIL NFR BLD AUTO: 2.2 % (ref 0.3–6.2)
ERYTHROCYTE [DISTWIDTH] IN BLOOD BY AUTOMATED COUNT: 15.2 % (ref 12.3–15.4)
GFR SERPL CREATININE-BSD FRML MDRD: 99 ML/MIN/1.73
GLOBULIN UR ELPH-MCNC: 3.1 GM/DL
GLUCOSE SERPL-MCNC: 88 MG/DL (ref 65–99)
HCT VFR BLD AUTO: 42.4 % (ref 34–46.6)
HGB BLD-MCNC: 14.1 G/DL (ref 12–15.9)
INR PPP: 1.9
LYMPHOCYTES # BLD AUTO: 3.05 10*3/MM3 (ref 0.7–3.1)
LYMPHOCYTES NFR BLD AUTO: 43.8 % (ref 19.6–45.3)
MCH RBC QN AUTO: 30.9 PG (ref 26.6–33)
MCHC RBC AUTO-ENTMCNC: 33.3 G/DL (ref 31.5–35.7)
MCV RBC AUTO: 93 FL (ref 79–97)
MONOCYTES # BLD AUTO: 0.61 10*3/MM3 (ref 0.1–0.9)
MONOCYTES NFR BLD AUTO: 8.8 % (ref 5–12)
NEUTROPHILS NFR BLD AUTO: 3.13 10*3/MM3 (ref 1.7–7)
NEUTROPHILS NFR BLD AUTO: 44.9 % (ref 42.7–76)
PLATELET # BLD AUTO: 289 10*3/MM3 (ref 140–450)
PMV BLD AUTO: 9.1 FL (ref 6–12)
POTASSIUM SERPL-SCNC: 4 MMOL/L (ref 3.5–5.2)
PROT SERPL-MCNC: 6.7 G/DL (ref 6–8.5)
RBC # BLD AUTO: 4.56 10*6/MM3 (ref 3.77–5.28)
SODIUM SERPL-SCNC: 142 MMOL/L (ref 136–145)
WBC # BLD AUTO: 6.96 10*3/MM3 (ref 3.4–10.8)

## 2020-11-19 PROCEDURE — 36415 COLL VENOUS BLD VENIPUNCTURE: CPT

## 2020-11-19 PROCEDURE — 99214 OFFICE O/P EST MOD 30 MIN: CPT | Performed by: NURSE PRACTITIONER

## 2020-11-19 PROCEDURE — 80053 COMPREHEN METABOLIC PANEL: CPT

## 2020-11-19 PROCEDURE — 85610 PROTHROMBIN TIME: CPT

## 2020-11-19 PROCEDURE — 85025 COMPLETE CBC W/AUTO DIFF WBC: CPT

## 2020-11-19 RX ORDER — WARFARIN SODIUM 3 MG/1
3 TABLET ORAL NIGHTLY
Qty: 90 TABLET | Refills: 1 | Status: SHIPPED | OUTPATIENT
Start: 2020-11-19 | End: 2021-05-17

## 2020-11-19 NOTE — PATIENT INSTRUCTIONS
1. CBC, CMP, and INR   2. INR monitored in this office  3. Repeat INR in 1 week   4. Change warfarin to 3 mg daily alternating with 4 mg daily - new prescription provided today   5. Patient to notify the office if she has surgery scheduled and will be bridged with Lovenox  6. Recommended immunization with influenza vaccination - patient has had already   7. Advised to continue social isolation/distancing due to the ongoing coronavirus pandemic.    8. Return to clinic in 3 months with Anushka Patel MD

## 2020-11-25 ENCOUNTER — HOSPITAL ENCOUNTER (OUTPATIENT)
Dept: ONCOLOGY | Facility: HOSPITAL | Age: 80
Setting detail: INFUSION SERIES
Discharge: HOME | End: 2020-11-25

## 2020-11-25 ENCOUNTER — LAB (OUTPATIENT)
Dept: LAB | Facility: HOSPITAL | Age: 80
End: 2020-11-25

## 2020-11-25 DIAGNOSIS — Z79.01 LONG TERM (CURRENT) USE OF ANTICOAGULANTS: Primary | ICD-10-CM

## 2020-11-25 LAB — INR PPP: 2.6

## 2020-11-25 PROCEDURE — 85610 PROTHROMBIN TIME: CPT

## 2020-11-25 PROCEDURE — 36416 COLLJ CAPILLARY BLOOD SPEC: CPT

## 2020-11-30 RX ORDER — ALLOPURINOL 300 MG/1
TABLET ORAL
Qty: 90 TABLET | Refills: 0 | Status: SHIPPED | OUTPATIENT
Start: 2020-11-30 | End: 2021-03-02

## 2020-11-30 RX ORDER — TRIAMTERENE AND HYDROCHLOROTHIAZIDE 75; 50 MG/1; MG/1
TABLET ORAL
Qty: 90 TABLET | Refills: 0 | Status: SHIPPED | OUTPATIENT
Start: 2020-11-30 | End: 2021-03-02

## 2020-12-03 ENCOUNTER — HOSPITAL ENCOUNTER (OUTPATIENT)
Dept: ONCOLOGY | Facility: HOSPITAL | Age: 80
Setting detail: INFUSION SERIES
Discharge: HOME | End: 2020-12-03

## 2020-12-03 ENCOUNTER — LAB (OUTPATIENT)
Dept: LAB | Facility: HOSPITAL | Age: 80
End: 2020-12-03

## 2020-12-03 DIAGNOSIS — Z79.01 LONG TERM (CURRENT) USE OF ANTICOAGULANTS: Primary | ICD-10-CM

## 2020-12-03 LAB — INR PPP: 2.6

## 2020-12-03 PROCEDURE — 36416 COLLJ CAPILLARY BLOOD SPEC: CPT

## 2020-12-03 PROCEDURE — 85610 PROTHROMBIN TIME: CPT

## 2020-12-31 ENCOUNTER — HOSPITAL ENCOUNTER (OUTPATIENT)
Dept: ONCOLOGY | Facility: HOSPITAL | Age: 80
Setting detail: INFUSION SERIES
Discharge: HOME | End: 2020-12-31

## 2020-12-31 ENCOUNTER — LAB (OUTPATIENT)
Dept: LAB | Facility: HOSPITAL | Age: 80
End: 2020-12-31

## 2020-12-31 DIAGNOSIS — Z79.01 LONG TERM (CURRENT) USE OF ANTICOAGULANTS: Primary | ICD-10-CM

## 2020-12-31 LAB — INR PPP: 2

## 2020-12-31 PROCEDURE — 85610 PROTHROMBIN TIME: CPT

## 2020-12-31 PROCEDURE — 36416 COLLJ CAPILLARY BLOOD SPEC: CPT

## 2021-01-05 RX ORDER — POTASSIUM CHLORIDE 20 MEQ/1
TABLET, EXTENDED RELEASE ORAL
Qty: 90 TABLET | Refills: 0 | Status: SHIPPED | OUTPATIENT
Start: 2021-01-05 | End: 2021-04-13

## 2021-01-22 ENCOUNTER — TELEPHONE (OUTPATIENT)
Dept: ONCOLOGY | Facility: CLINIC | Age: 81
End: 2021-01-22

## 2021-01-25 ENCOUNTER — TELEPHONE (OUTPATIENT)
Dept: ONCOLOGY | Facility: CLINIC | Age: 81
End: 2021-01-25

## 2021-01-25 NOTE — TELEPHONE ENCOUNTER
Returned pt's call regarding COVID vaccine. I told her that it is highly recommended that everyone get vaccinated as long as they are not on active chemotherapy and their labs are WNL. Pt is not on chemo and labs look good so I told her I do not see a reason she should not get the vaccine. Pt verbalized understanding.

## 2021-01-28 ENCOUNTER — APPOINTMENT (OUTPATIENT)
Dept: LAB | Facility: HOSPITAL | Age: 81
End: 2021-01-28

## 2021-01-28 ENCOUNTER — APPOINTMENT (OUTPATIENT)
Dept: ONCOLOGY | Facility: HOSPITAL | Age: 81
End: 2021-01-28

## 2021-02-03 DIAGNOSIS — Z79.01 LONG TERM (CURRENT) USE OF ANTICOAGULANTS: Primary | ICD-10-CM

## 2021-02-04 ENCOUNTER — HOSPITAL ENCOUNTER (OUTPATIENT)
Dept: ONCOLOGY | Facility: HOSPITAL | Age: 81
Setting detail: INFUSION SERIES
Discharge: HOME | End: 2021-02-04

## 2021-02-04 ENCOUNTER — LAB (OUTPATIENT)
Dept: LAB | Facility: HOSPITAL | Age: 81
End: 2021-02-04

## 2021-02-04 DIAGNOSIS — I26.99 OTHER PULMONARY EMBOLISM WITHOUT ACUTE COR PULMONALE, UNSPECIFIED CHRONICITY (HCC): ICD-10-CM

## 2021-02-04 DIAGNOSIS — Z79.01 LONG TERM (CURRENT) USE OF ANTICOAGULANTS: Primary | ICD-10-CM

## 2021-02-04 DIAGNOSIS — Z79.01 LONG TERM (CURRENT) USE OF ANTICOAGULANTS: ICD-10-CM

## 2021-02-04 LAB
BASOPHILS # BLD AUTO: 0.05 10*3/MM3 (ref 0–0.2)
BASOPHILS NFR BLD AUTO: 0.7 % (ref 0–1.5)
DEPRECATED RDW RBC AUTO: 47.2 FL (ref 37–54)
EOSINOPHIL # BLD AUTO: 0.16 10*3/MM3 (ref 0–0.4)
EOSINOPHIL NFR BLD AUTO: 2.2 % (ref 0.3–6.2)
ERYTHROCYTE [DISTWIDTH] IN BLOOD BY AUTOMATED COUNT: 14.4 % (ref 12.3–15.4)
HCT VFR BLD AUTO: 45.6 % (ref 34–46.6)
HGB BLD-MCNC: 15.7 G/DL (ref 12–15.9)
INR PPP: 2.8
LYMPHOCYTES # BLD AUTO: 3.06 10*3/MM3 (ref 0.7–3.1)
LYMPHOCYTES NFR BLD AUTO: 42.9 % (ref 19.6–45.3)
MCH RBC QN AUTO: 31.5 PG (ref 26.6–33)
MCHC RBC AUTO-ENTMCNC: 34.4 G/DL (ref 31.5–35.7)
MCV RBC AUTO: 91.4 FL (ref 79–97)
MONOCYTES # BLD AUTO: 0.68 10*3/MM3 (ref 0.1–0.9)
MONOCYTES NFR BLD AUTO: 9.5 % (ref 5–12)
NEUTROPHILS NFR BLD AUTO: 3.18 10*3/MM3 (ref 1.7–7)
NEUTROPHILS NFR BLD AUTO: 44.7 % (ref 42.7–76)
PLATELET # BLD AUTO: 303 10*3/MM3 (ref 140–450)
PMV BLD AUTO: 10 FL (ref 6–12)
RBC # BLD AUTO: 4.99 10*6/MM3 (ref 3.77–5.28)
WBC # BLD AUTO: 7.13 10*3/MM3 (ref 3.4–10.8)

## 2021-02-04 PROCEDURE — 36416 COLLJ CAPILLARY BLOOD SPEC: CPT

## 2021-02-04 PROCEDURE — 85610 PROTHROMBIN TIME: CPT

## 2021-02-04 PROCEDURE — 85025 COMPLETE CBC W/AUTO DIFF WBC: CPT

## 2021-02-04 NOTE — PROGRESS NOTES
Patient INR is within range, recheck one month. Next appts given, patient states she has plenty of tablets at home.

## 2021-02-11 RX ORDER — ATORVASTATIN CALCIUM 10 MG/1
TABLET, FILM COATED ORAL
Qty: 90 TABLET | Refills: 0 | Status: SHIPPED | OUTPATIENT
Start: 2021-02-11 | End: 2021-05-17

## 2021-02-22 ENCOUNTER — TELEPHONE (OUTPATIENT)
Dept: ONCOLOGY | Facility: CLINIC | Age: 81
End: 2021-02-22

## 2021-02-22 NOTE — TELEPHONE ENCOUNTER
Caller: Adelso    Relationship to patient: Pt    Best call back number: [phone]     Type of visit: Lab and follow up     Requested date: 03/04     If rescheduling, when is the original appointment: 02/25

## 2021-02-25 ENCOUNTER — APPOINTMENT (OUTPATIENT)
Dept: LAB | Facility: HOSPITAL | Age: 81
End: 2021-02-25

## 2021-02-26 ENCOUNTER — TELEPHONE (OUTPATIENT)
Dept: ONCOLOGY | Facility: CLINIC | Age: 81
End: 2021-02-26

## 2021-03-02 RX ORDER — ALLOPURINOL 300 MG/1
TABLET ORAL
Qty: 90 TABLET | Refills: 0 | Status: SHIPPED | OUTPATIENT
Start: 2021-03-02 | End: 2021-05-17

## 2021-03-02 RX ORDER — TRIAMTERENE AND HYDROCHLOROTHIAZIDE 75; 50 MG/1; MG/1
TABLET ORAL
Qty: 90 TABLET | Refills: 0 | Status: SHIPPED | OUTPATIENT
Start: 2021-03-02 | End: 2021-05-17

## 2021-03-04 ENCOUNTER — HOSPITAL ENCOUNTER (OUTPATIENT)
Dept: ONCOLOGY | Facility: HOSPITAL | Age: 81
Setting detail: INFUSION SERIES
Discharge: HOME | End: 2021-03-04

## 2021-03-04 ENCOUNTER — LAB (OUTPATIENT)
Dept: LAB | Facility: HOSPITAL | Age: 81
End: 2021-03-04

## 2021-03-04 DIAGNOSIS — Z79.01 LONG TERM (CURRENT) USE OF ANTICOAGULANTS: Primary | ICD-10-CM

## 2021-03-04 DIAGNOSIS — Z79.01 LONG TERM (CURRENT) USE OF ANTICOAGULANTS: ICD-10-CM

## 2021-03-04 LAB — INR PPP: 2.8

## 2021-03-04 PROCEDURE — 85610 PROTHROMBIN TIME: CPT

## 2021-03-04 PROCEDURE — G0463 HOSPITAL OUTPT CLINIC VISIT: HCPCS

## 2021-03-04 NOTE — PROGRESS NOTES
Patient here for INR, within range will recheck in one month. Patient would like to push appt by one week so she can have her INR checked at her follow up. Next appt given, patient states she has plenty of tabs at home.

## 2021-04-13 ENCOUNTER — OFFICE VISIT (OUTPATIENT)
Dept: ONCOLOGY | Facility: CLINIC | Age: 81
End: 2021-04-13

## 2021-04-13 ENCOUNTER — LAB (OUTPATIENT)
Dept: LAB | Facility: HOSPITAL | Age: 81
End: 2021-04-13

## 2021-04-13 VITALS
WEIGHT: 217.2 LBS | BODY MASS INDEX: 34.91 KG/M2 | HEART RATE: 109 BPM | HEIGHT: 66 IN | RESPIRATION RATE: 18 BRPM | TEMPERATURE: 97.3 F | DIASTOLIC BLOOD PRESSURE: 75 MMHG | SYSTOLIC BLOOD PRESSURE: 126 MMHG

## 2021-04-13 DIAGNOSIS — I26.99 OTHER PULMONARY EMBOLISM WITHOUT ACUTE COR PULMONALE, UNSPECIFIED CHRONICITY (HCC): ICD-10-CM

## 2021-04-13 DIAGNOSIS — Z79.01 LONG TERM (CURRENT) USE OF ANTICOAGULANTS: Primary | ICD-10-CM

## 2021-04-13 DIAGNOSIS — R76.0 ANTICARDIOLIPIN ANTIBODY POSITIVE: ICD-10-CM

## 2021-04-13 DIAGNOSIS — Z79.01 LONG TERM (CURRENT) USE OF ANTICOAGULANTS: ICD-10-CM

## 2021-04-13 LAB
BASOPHILS # BLD AUTO: 0.05 10*3/MM3 (ref 0–0.2)
BASOPHILS NFR BLD AUTO: 0.5 % (ref 0–1.5)
DEPRECATED RDW RBC AUTO: 49 FL (ref 37–54)
EOSINOPHIL # BLD AUTO: 0.18 10*3/MM3 (ref 0–0.4)
EOSINOPHIL NFR BLD AUTO: 1.8 % (ref 0.3–6.2)
ERYTHROCYTE [DISTWIDTH] IN BLOOD BY AUTOMATED COUNT: 14.8 % (ref 12.3–15.4)
HCT VFR BLD AUTO: 44.8 % (ref 34–46.6)
HGB BLD-MCNC: 15 G/DL (ref 12–15.9)
INR PPP: 2.7
LYMPHOCYTES # BLD AUTO: 4.22 10*3/MM3 (ref 0.7–3.1)
LYMPHOCYTES NFR BLD AUTO: 43.1 % (ref 19.6–45.3)
MCH RBC QN AUTO: 30.9 PG (ref 26.6–33)
MCHC RBC AUTO-ENTMCNC: 33.5 G/DL (ref 31.5–35.7)
MCV RBC AUTO: 92.2 FL (ref 79–97)
MONOCYTES # BLD AUTO: 1.01 10*3/MM3 (ref 0.1–0.9)
MONOCYTES NFR BLD AUTO: 10.3 % (ref 5–12)
NEUTROPHILS NFR BLD AUTO: 4.34 10*3/MM3 (ref 1.7–7)
NEUTROPHILS NFR BLD AUTO: 44.3 % (ref 42.7–76)
PLATELET # BLD AUTO: 169 10*3/MM3 (ref 140–450)
PMV BLD AUTO: 11.5 FL (ref 6–12)
RBC # BLD AUTO: 4.86 10*6/MM3 (ref 3.77–5.28)
WBC # BLD AUTO: 9.8 10*3/MM3 (ref 3.4–10.8)

## 2021-04-13 PROCEDURE — 85025 COMPLETE CBC W/AUTO DIFF WBC: CPT

## 2021-04-13 PROCEDURE — 85610 PROTHROMBIN TIME: CPT

## 2021-04-13 PROCEDURE — 99213 OFFICE O/P EST LOW 20 MIN: CPT | Performed by: INTERNAL MEDICINE

## 2021-04-13 RX ORDER — POTASSIUM CHLORIDE 1500 MG/1
TABLET, EXTENDED RELEASE ORAL
Qty: 90 TABLET | Refills: 0 | Status: SHIPPED | OUTPATIENT
Start: 2021-04-13 | End: 2021-07-11

## 2021-04-13 NOTE — PROGRESS NOTES
Hematology/Oncology Outpatient Follow Up    PATIENT NAME:Adelso Lynne  :1940  MRN: [id number]  PRIMARY CARE PHYSICIAN: Lyell, Reggie Duane, MD  REFERRING PHYSICIAN: Lyell, Reggie Duane, MD    Chief Complaint   Patient presents with   • Appointment     IVC thrombosis (CMS/HCC)   • Follow-up     HISTORY OF PRESENT ILLNESS:     This is an 80 y.o.female who has a history of pulmonary embolism at the age of 25 [more than 50 years ago].  Subsequently she developed deep venous thrombosis and patient was treated with warfarin therapy at the time.  She has a strong family  history of blood clots in her mother, brother, son and sister, but there is no known mutation yet identified in the family.  Patient was seen by Amalia Salamanca with [facility] and had an MRI of the abdomen to evaluate elevated liver function tests.  The liver showed evidence of fatty infiltration, but there was no mass identified.  The kidneys were normal.  The spleen did not show any abnormalities, as well as the pancreas.  There was a filling defect noted in the inferior vena cava below the renal veins, thought to represent some partial thrombosis.  The patient was then placed on Lovenox 40 mg subcutaneously daily and then referred for further evaluation and management of her thrombophilia.  Patient denies any new symptoms such as weight loss, night sweats, or fatigue.  She was able to carry out her own activities of daily living.  Her last colonoscopy was about three to four years ago and she is up to date on her routine mammograms.  Patient was accompanied by her daughter today for that appointment.      • Since her last visit on 16, patient was initiated on anticoagulation with warfarin.  Anticardiolipin IgG antibody was slightly high at 24 [normal < 23], beta-2 glycoprotein was negative.  Factor V Leiden was not mutated.  Prothrombin gene was not mutated as well.  PT 12.4, INR 1, PTT 27.  Protein C activity (N) 129.  Antithrombin III activity  (N) 99.  Protein S activity (N) 85%.  · 8/27/2020  -duplex venous lower extremity bilateral - chronic left lower extremity deep vein thrombosis noted in the common femoral, deep femoral, proximal femoral, mid femoral and gastrocnemius.All other veins appeared normal bilaterally. Imaging reviewed by Anushka Patel MD and patient was continued on warfarin.  INR was therapeutic.   • 11/19/2020 Patient continued on warfarin.     Past Medical History:   Diagnosis Date   • Arthritis    • Deep venous thrombosis (CMS/HCC)    • Hyperlipidemia    • Hypertension    • Pulmonary embolism (CMS/HCC)        Past Surgical History:   Procedure Laterality Date   • ANKLE SURGERY      due to fracture   • CYST REMOVAL      from the left wrist    • HYSTERECTOMY           Current Outpatient Medications:   •  allopurinol (ZYLOPRIM) 300 MG tablet, Take 1 tablet by mouth once daily, Disp: 90 tablet, Rfl: 0  •  atorvastatin (LIPITOR) 10 MG tablet, Take 1 tablet by mouth in the evening, Disp: 90 tablet, Rfl: 0  •  Cholecalciferol (VITAMIN D) 2000 units tablet, Take 2,000 Units by mouth Daily., Disp: , Rfl:   •  KLOR-CON 20 MEQ CR tablet, Take 1 tablet by mouth once daily, Disp: 90 tablet, Rfl: 0  •  Multiple Vitamins-Minerals (MULTIVITAMIN WITH MINERALS) tablet tablet, Take 1 tablet by mouth Daily., Disp: , Rfl:   •  triamterene-hydrochlorothiazide (MAXZIDE) 75-50 MG per tablet, Take 1 tablet by mouth once daily, Disp: 90 tablet, Rfl: 0  •  warfarin (COUMADIN) 1 MG tablet, Take 1 tablet by mouth Every Night. At 1700 or as directed., Disp: 30 tablet, Rfl: 5  •  warfarin (COUMADIN) 3 MG tablet, Take 1 tablet by mouth Every Night. At 1700 or as directed, Disp: 90 tablet, Rfl: 1    Allergies   Allergen Reactions   • Codeine Shortness Of Breath, Rash and Unknown (See Comments)   • Hydroxychloroquine Rash     Light flashes spider web in right eye and black dot in left eye       Family History   Problem Relation Age of Onset   • Breast cancer  Daughter         onset in 40s   • Prostate cancer Son         onset in 60s   • Colon cancer Other        Cancer-related family history includes Breast cancer in her daughter; Colon cancer in an other family member; Prostate cancer in her son.    Social History     Tobacco Use   • Smoking status: Never Smoker   • Smokeless tobacco: Never Used   Substance Use Topics   • Alcohol use: Never   • Drug use: Never       HPI, ROS and PFSH have been reviewed and confirmed on 4/13/2021.     SUBJECTIVE:    The patient presented for a scheduled follow up appointment.   She has no new issues.  Patient is not scheduled for any surgical procedures at this time.        REVIEW OF SYSTEMS:    Review of Systems   Constitutional: Negative for appetite change, chills, fatigue, fever and unexpected weight change.   HENT: Negative for congestion, hearing loss, mouth sores, nosebleeds, postnasal drip, rhinorrhea and sore throat.    Eyes: Negative for photophobia, pain and visual disturbance.   Respiratory: Negative for cough, chest tightness, shortness of breath and wheezing.    Cardiovascular: Negative for chest pain, palpitations and leg swelling ( RLE - chronic ).   Gastrointestinal: Negative for abdominal distention, abdominal pain, constipation, diarrhea, nausea and vomiting.   Genitourinary: Negative for difficulty urinating, dysuria and hematuria.   Musculoskeletal: Positive for gait problem ( uses cane ). Negative for arthralgias, back pain and myalgias.   Skin: Negative for pallor and rash.   Neurological: Negative for dizziness, weakness, light-headedness, numbness and headaches.   Hematological: Negative for adenopathy. Does not bruise/bleed easily.   Psychiatric/Behavioral: Negative for agitation, confusion and dysphoric mood. The patient is not nervous/anxious.    All other systems reviewed and are negative.    OBJECTIVE:  Vitals:    04/13/21 1147   BP: 126/75   Pulse: 109   Resp: 18   Temp: 97.3 °F (36.3 °C)   Weight: 98.5 kg  "(217 lb 3.2 oz)   Height: 167.6 cm (66\")   PainSc: 0-No pain     ECOG  (1) Restricted in physically strenuous activity, ambulatory and able to do work of light nature    Physical Exam   Constitutional: She is oriented to person, place, and time. No distress.   HENT:   Head: Normocephalic and atraumatic.   Eyes: Conjunctivae are normal. Right eye exhibits no discharge. Left eye exhibits no discharge. No scleral icterus.   Neck: No thyromegaly present.   Cardiovascular: Normal rate, regular rhythm and normal heart sounds. Exam reveals no gallop and no friction rub.   Pulmonary/Chest: Effort normal. No stridor. No respiratory distress. She has no wheezes.   Abdominal: Soft. Bowel sounds are normal. She exhibits no mass. There is no abdominal tenderness. There is no rebound and no guarding.   Musculoskeletal: Normal range of motion. No tenderness.      Comments: Right lower extremity   Lymphadenopathy:     She has no cervical adenopathy.   Neurological: She is alert and oriented to person, place, and time. She exhibits normal muscle tone. Gait ( ambulates with cane ) abnormal.   Stooped posture    Skin: Skin is warm. No rash noted. She is not diaphoretic. No erythema.   Psychiatric: Her behavior is normal.   Nursing note and vitals reviewed.      I have reexamined the patient and the results are consistent with the previously documented exam. Anushka Patel MD     RECENT LABS  WBC   Date Value Ref Range Status   04/13/2021 9.80 3.40 - 10.80 10*3/mm3 Final     RBC   Date Value Ref Range Status   04/13/2021 4.86 3.77 - 5.28 10*6/mm3 Final     Hemoglobin   Date Value Ref Range Status   04/13/2021 15.0 12.0 - 15.9 g/dL Final     Hematocrit   Date Value Ref Range Status   04/13/2021 44.8 34.0 - 46.6 % Final     MCV   Date Value Ref Range Status   04/13/2021 92.2 79.0 - 97.0 fL Final     MCH   Date Value Ref Range Status   04/13/2021 30.9 26.6 - 33.0 pg Final     MCHC   Date Value Ref Range Status   04/13/2021 33.5 " 31.5 - 35.7 g/dL Final     RDW   Date Value Ref Range Status   04/13/2021 14.8 12.3 - 15.4 % Final     RDW-SD   Date Value Ref Range Status   04/13/2021 49.0 37.0 - 54.0 fl Final     MPV   Date Value Ref Range Status   04/13/2021 11.5 6.0 - 12.0 fL Final     Platelets   Date Value Ref Range Status   04/13/2021 169 140 - 450 10*3/mm3 Final     Neutrophil %   Date Value Ref Range Status   04/13/2021 44.3 42.7 - 76.0 % Final     Lymphocyte %   Date Value Ref Range Status   04/13/2021 43.1 19.6 - 45.3 % Final     Monocyte %   Date Value Ref Range Status   04/13/2021 10.3 5.0 - 12.0 % Final     Eosinophil %   Date Value Ref Range Status   04/13/2021 1.8 0.3 - 6.2 % Final     Basophil %   Date Value Ref Range Status   04/13/2021 0.5 0.0 - 1.5 % Final     Neutrophils, Absolute   Date Value Ref Range Status   04/13/2021 4.34 1.70 - 7.00 10*3/mm3 Final     Lymphocytes, Absolute   Date Value Ref Range Status   04/13/2021 4.22 (H) 0.70 - 3.10 10*3/mm3 Final     Monocytes, Absolute   Date Value Ref Range Status   04/13/2021 1.01 (H) 0.10 - 0.90 10*3/mm3 Final     Eosinophils, Absolute   Date Value Ref Range Status   04/13/2021 0.18 0.00 - 0.40 10*3/mm3 Final     Basophils, Absolute   Date Value Ref Range Status   04/13/2021 0.05 0.00 - 0.20 10*3/mm3 Final     nRBC   Date Value Ref Range Status   06/22/2019 0.1 0.0 - 0.2 /100 WBC Final       Lab Results   Component Value Date    GLUCOSE 88 11/19/2020    BUN 11 11/19/2020    CREATININE 0.69 11/19/2020    EGFRIFNONA 90 11/17/2017    EGFRIFAFRI 99 11/19/2020    BCR 15.9 11/19/2020    K 4.0 11/19/2020    CO2 30.0 (H) 11/19/2020    CALCIUM 9.6 11/19/2020    ALBUMIN 3.60 11/19/2020    LABIL2 0.8 (L) 02/21/2019    AST 24 11/19/2020    ALT 24 11/19/2020     Assessment/Plan   Long term (current) use of anticoagulants  - CBC & Differential  - Protime-INR, Fingerstick    Anticardiolipin antibody positive  - CBC & Differential  - Protime-INR, Fingerstick    ASSESSMENT:    1. Left lower  extremity DVT- 8/27/2020 Chronic left lower extremity deep vein thrombosis noted in the common femoral, deep femoral, proximal femoral, mid femoral and gastrocnemius. All other veins appeared normal bilaterally.  2. 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  On anticoagulation therapy  3. History of pulmonary embolism and deep venous thrombosis in the past.  On anticoagulation therapy with warfarin  4. Strong family history of thrombophilia in multiple first degree relatives.   5. Anticardiolipin IgG antibody, slightly elevated at 24.   6. Monitoring Coumadin therapy. INR has been stable.  She has therapeutic levels and currently on monthly blood draws.    PLANS:    1. CBC,INR reviewed  2. INR monitored in this office in 1 month  3. Levels are therapeutic  4. Patient to notify the office if she has surgery scheduled and will be bridged with Lovenox  5. Recommended immunization with influenza vaccination - patient has had already   6. Advised to continue social isolation/distancing due to the ongoing coronavirus pandemic.   7. RTC in 4 months with Anushka Patel MD           I have reviewed labs results, imaging, vitals, and medications with the patient today. Will follow up in 4 months

## 2021-04-19 ENCOUNTER — ANTICOAGULATION VISIT (OUTPATIENT)
Dept: ONCOLOGY | Facility: HOSPITAL | Age: 81
End: 2021-04-19

## 2021-04-29 ENCOUNTER — OFFICE VISIT (OUTPATIENT)
Dept: FAMILY MEDICINE CLINIC | Facility: CLINIC | Age: 81
End: 2021-04-29

## 2021-04-29 VITALS
DIASTOLIC BLOOD PRESSURE: 64 MMHG | HEART RATE: 97 BPM | HEIGHT: 66 IN | TEMPERATURE: 97.8 F | RESPIRATION RATE: 16 BRPM | SYSTOLIC BLOOD PRESSURE: 138 MMHG | OXYGEN SATURATION: 95 % | BODY MASS INDEX: 34.23 KG/M2 | WEIGHT: 213 LBS

## 2021-04-29 DIAGNOSIS — I10 ESSENTIAL HYPERTENSION: ICD-10-CM

## 2021-04-29 DIAGNOSIS — E55.9 VITAMIN D DEFICIENCY: ICD-10-CM

## 2021-04-29 DIAGNOSIS — R76.0 ANTICARDIOLIPIN ANTIBODY POSITIVE: ICD-10-CM

## 2021-04-29 DIAGNOSIS — R74.8 HIGH ALKALINE PHOSPHATASE: ICD-10-CM

## 2021-04-29 DIAGNOSIS — M1A.09X0 IDIOPATHIC CHRONIC GOUT OF MULTIPLE SITES WITHOUT TOPHUS: ICD-10-CM

## 2021-04-29 DIAGNOSIS — Z91.81 AT MODERATE RISK FOR FALL: ICD-10-CM

## 2021-04-29 DIAGNOSIS — R74.8 ELEVATED LIVER ENZYMES: ICD-10-CM

## 2021-04-29 DIAGNOSIS — M05.79 RHEUMATOID ARTHRITIS INVOLVING MULTIPLE SITES WITH POSITIVE RHEUMATOID FACTOR (HCC): ICD-10-CM

## 2021-04-29 DIAGNOSIS — I27.82 OTHER CHRONIC PULMONARY EMBOLISM WITHOUT ACUTE COR PULMONALE (HCC): ICD-10-CM

## 2021-04-29 DIAGNOSIS — Q76.2 CONGENITAL SPONDYLOLISTHESIS: ICD-10-CM

## 2021-04-29 DIAGNOSIS — M15.9 PRIMARY OSTEOARTHRITIS INVOLVING MULTIPLE JOINTS: ICD-10-CM

## 2021-04-29 DIAGNOSIS — E78.2 MIXED HYPERLIPIDEMIA: ICD-10-CM

## 2021-04-29 DIAGNOSIS — Z00.00 MEDICARE ANNUAL WELLNESS VISIT, SUBSEQUENT: Primary | ICD-10-CM

## 2021-04-29 PROBLEM — I26.99 PULMONARY EMBOLISM (HCC): Status: RESOLVED | Noted: 2019-06-20 | Resolved: 2021-04-29

## 2021-04-29 PROBLEM — I82.220 IVC THROMBOSIS (HCC): Status: RESOLVED | Noted: 2019-06-20 | Resolved: 2021-04-29

## 2021-04-29 PROCEDURE — G0439 PPPS, SUBSEQ VISIT: HCPCS | Performed by: FAMILY MEDICINE

## 2021-04-29 NOTE — PROGRESS NOTES
Chief Complaint   Patient presents with   • Medicare Wellness-subsequent   • Hypertension   • Hyperlipidemia       Subjective   History of Present Illness:  Adelso Lynne is a 80 y.o. female who presents for a Subsequent Medicare Wellness Visit.    HEALTH RISK ASSESSMENT    Recent Hospitalizations:  No hospitalization(s) within the last year.    Current Medical Providers:  Patient Care Team:  Lyell, Reggie Duane, MD as PCP - General (Family Medicine)  Anushka Patel MD as Consulting Physician (Hematology and Oncology)    Smoking Status:  Social History     Tobacco Use   Smoking Status Never Smoker   Smokeless Tobacco Never Used       Alcohol Consumption:  Social History     Substance and Sexual Activity   Alcohol Use Never       Depression Screen:   PHQ-2/PHQ-9 Depression Screening 4/29/2021   Little interest or pleasure in doing things 0   Feeling down, depressed, or hopeless 0   Trouble falling or staying asleep, or sleeping too much 0   Feeling tired or having little energy 0   Poor appetite or overeating 0   Feeling bad about yourself - or that you are a failure or have let yourself or your family down 0   Trouble concentrating on things, such as reading the newspaper or watching television 0   Moving or speaking so slowly that other people could have noticed. Or the opposite - being so fidgety or restless that you have been moving around a lot more than usual 0   Thoughts that you would be better off dead, or of hurting yourself in some way 0   Total Score 0   If you checked off any problems, how difficult have these problems made it for you to do your work, take care of things at home, or get along with other people? Not difficult at all       Fall Risk Screen:  STEADI Fall Risk Assessment was completed, and patient is at MODERATE risk for falls. Assessment completed on:4/29/2021    Health Habits and Functional and Cognitive Screening:  Functional & Cognitive Status 4/29/2021   Do you have  difficulty preparing food and eating? No   Do you have difficulty bathing yourself, getting dressed or grooming yourself? No   Do you have difficulty using the toilet? No   Do you have difficulty moving around from place to place? No   Do you have trouble with steps or getting out of a bed or a chair? No   Current Diet Well Balanced Diet   Dental Exam Not up to date   Eye Exam Not up to date   Exercise (times per week) 0 times per week   Current Exercises Include No Regular Exercise   Current Exercise Activities Include -   Do you need help using the phone?  No   Are you deaf or do you have serious difficulty hearing?  No   Do you need help with transportation? No   Do you need help shopping? No   Do you need help preparing meals?  No   Do you need help with housework?  No   Do you need help with laundry? No   Do you need help taking your medications? No   Do you need help managing money? No   Do you ever drive or ride in a car without wearing a seat belt? No   Have you felt unusual stress, anger or loneliness in the last month? No   Who do you live with? Child   If you need help, do you have trouble finding someone available to you? No   Have you been bothered in the last four weeks by sexual problems? No   Do you have difficulty concentrating, remembering or making decisions? No         Does the patient have evidence of cognitive impairment? No    Asprin use counseling:Does not need ASA (and currently is not on it)    Age-appropriate Screening Schedule:  Refer to the list below for future screening recommendations based on patient's age, sex and/or medical conditions. Orders for these recommended tests are listed in the plan section. The patient has been provided with a written plan.    Health Maintenance   Topic Date Due   • DXA SCAN  Never done   • TDAP/TD VACCINES (1 - Tdap) Never done   • ZOSTER VACCINE (1 of 2) Never done   • LIPID PANEL  06/18/2019   • INFLUENZA VACCINE  08/01/2021          The following  portions of the patient's history were reviewed and updated as appropriate: allergies, current medications, past family history, past medical history, past social history, past surgical history and problem list.    Outpatient Medications Prior to Visit   Medication Sig Dispense Refill   • allopurinol (ZYLOPRIM) 300 MG tablet Take 1 tablet by mouth once daily 90 tablet 0   • atorvastatin (LIPITOR) 10 MG tablet Take 1 tablet by mouth in the evening 90 tablet 0   • Cholecalciferol (VITAMIN D) 2000 units tablet Take 2,000 Units by mouth Daily.     • KLOR-CON 20 MEQ CR tablet Take 1 tablet by mouth once daily 90 tablet 0   • Multiple Vitamins-Minerals (MULTIVITAMIN WITH MINERALS) tablet tablet Take 1 tablet by mouth Daily.     • triamterene-hydrochlorothiazide (MAXZIDE) 75-50 MG per tablet Take 1 tablet by mouth once daily 90 tablet 0   • warfarin (COUMADIN) 1 MG tablet Take 1 tablet by mouth Every Night. At 1700 or as directed. 30 tablet 5   • warfarin (COUMADIN) 3 MG tablet Take 1 tablet by mouth Every Night. At 1700 or as directed 90 tablet 1     No facility-administered medications prior to visit.       Patient Active Problem List   Diagnosis   • Anticardiolipin antibody positive   • Arthritis, rheumatoid (CMS/HCC)   • Congenital spondylolisthesis   • High alkaline phosphatase   • Elevated liver enzymes   • Gout   • Hyperlipidemia   • Hypertension   • Hypokalemia   • Impaired glucose tolerance   • Menopausal syndrome   • Osteoarthritis   • Venous insufficiency   • Vitamin D deficiency   • Medicare annual wellness visit, subsequent       Advanced Care Planning:  ACP discussion was held with the patient during this visit. Patient does not have an advance directive, information provided.    Review of Systems   Constitutional: Negative for activity change, appetite change, chills, fatigue, fever and unexpected weight change.   HENT: Negative for congestion, ear discharge, ear pain, facial swelling, hearing loss,  nosebleeds, postnasal drip, rhinorrhea, sinus pressure, sinus pain, sneezing, sore throat, tinnitus, trouble swallowing and voice change.    Eyes: Negative for photophobia, pain, discharge, redness, itching and visual disturbance.   Respiratory: Negative for apnea, cough, choking, chest tightness, shortness of breath and wheezing.    Cardiovascular: Negative for chest pain and palpitations.   Gastrointestinal: Negative for abdominal distention, abdominal pain, blood in stool, constipation, diarrhea, nausea and vomiting.   Endocrine: Negative for cold intolerance, heat intolerance, polydipsia and polyphagia.   Genitourinary: Negative for difficulty urinating, dysuria, enuresis, flank pain, frequency, hematuria, pelvic pain and urgency.   Musculoskeletal: Negative for arthralgias, back pain, gait problem, joint swelling, myalgias, neck pain and neck stiffness.   Skin: Negative for pallor, rash and wound.   Allergic/Immunologic: Negative for environmental allergies and food allergies.   Neurological: Negative for dizziness, tremors, seizures, syncope, speech difficulty, weakness, light-headedness, numbness and headaches.   Hematological: Negative for adenopathy. Does not bruise/bleed easily.   Psychiatric/Behavioral: Negative for confusion, decreased concentration, hallucinations and sleep disturbance. The patient is not nervous/anxious.        Compared to one year ago, the patient feels her physical health is the same.  Compared to one year ago, the patient feels her mental health is the same.    Reviewed chart for potential of high risk medication in the elderly: yes  Reviewed chart for potential of harmful drug interactions in the elderly:yes    Objective         Vitals:    04/29/21 1507 04/29/21 1517   BP: 144/70 138/64   BP Location: Left arm    Patient Position: Sitting    Cuff Size: Adult    Pulse: 97    Resp: 16    Temp: 97.8 °F (36.6 °C)    TempSrc: Infrared    SpO2: 95%    Weight: 96.6 kg (213 lb)    Height:  "167.6 cm (66\")    PainSc: 0-No pain        Body mass index is 34.38 kg/m².  Discussed the patient's BMI with her. The BMI is above average; BMI management plan is completed.    Physical Exam  Constitutional:       Appearance: She is well-developed.   HENT:      Head: Normocephalic and atraumatic.   Eyes:      General: No scleral icterus.        Right eye: No discharge.         Left eye: No discharge.      Conjunctiva/sclera: Conjunctivae normal.      Pupils: Pupils are equal, round, and reactive to light.   Neck:      Thyroid: No thyromegaly.   Cardiovascular:      Rate and Rhythm: Normal rate and regular rhythm.      Heart sounds: Normal heart sounds. No murmur heard.   No friction rub. No gallop.    Pulmonary:      Effort: Pulmonary effort is normal. No respiratory distress.      Breath sounds: Normal breath sounds. No wheezing or rales.   Abdominal:      General: Bowel sounds are normal. There is no distension.      Palpations: Abdomen is soft. There is no mass.      Tenderness: There is no abdominal tenderness. There is no guarding or rebound.   Musculoskeletal:         General: No tenderness or deformity. Normal range of motion.   Lymphadenopathy:      Cervical: No cervical adenopathy.   Skin:     General: Skin is warm and dry.      Findings: No erythema or rash.   Neurological:      Mental Status: She is alert and oriented to person, place, and time.      Cranial Nerves: No cranial nerve deficit.      Sensory: No sensory deficit.      Motor: No abnormal muscle tone.      Coordination: Coordination normal.      Deep Tendon Reflexes: Reflexes normal.   Psychiatric:         Behavior: Behavior normal.         Thought Content: Thought content normal.         Judgment: Judgment normal.               Assessment/Plan   Medicare Risks and Personalized Health Plan  CMS Preventative Services Quick Reference  Advance Directive Discussion  Obesity/Overweight     The above risks/problems have been discussed with the " patient.  Pertinent information has been shared with the patient in the After Visit Summary.  Follow up plans and orders are seen below in the Assessment/Plan Section.    Diagnoses and all orders for this visit:    1. Medicare annual wellness visit, subsequent (Primary)  Assessment & Plan:  Updated annual wellness visit checklist.  Immunizations up-to-date.  Screening up-to-date.  Recommend yearly dental and eye exams. Also discussed monitoring of blood pressure and lipids.      2. Mixed hyperlipidemia  Assessment & Plan:  Recheck blood work and call with results.  Continue atorvastatin for now.    Orders:  -     Comprehensive Metabolic Panel  -     Lipid Panel    3. Essential hypertension  Assessment & Plan:  Doing well.  Continue Dyazide.    Orders:  -     CBC & Differential    4. Other chronic pulmonary embolism without acute cor pulmonale (CMS/[facility])    5. Vitamin D deficiency  Assessment & Plan:  Continue supplementation.      6. Elevated liver enzymes  Assessment & Plan:  Recheck blood work.      7. Rheumatoid arthritis involving multiple sites with positive rheumatoid factor (CMS/[facility])  Assessment & Plan:  At baseline.  She does pretty well.  Does not want to pursue any antirheumatic medications.      8. Idiopathic chronic gout of multiple sites without tophus  Assessment & Plan:  No recent flare.  Recheck uric acid level and renal function.    Orders:  -     Uric Acid    9. Primary osteoarthritis involving multiple joints    10. High alkaline phosphatase  Assessment & Plan:  Recheck blood work.      11. Congenital spondylolisthesis    12. At moderate risk for fall    13. Anticardiolipin antibody positive  Assessment & Plan:  Continue routine monitoring by hematology.  Continue anticoagulation therapy.      Follow Up:  Return in about 1 year (around 4/29/2022) for Medicare Wellness, Recheck blood pressure.     An After Visit Summary and PPPS were given to the patient.

## 2021-04-29 NOTE — PATIENT INSTRUCTIONS

## 2021-04-30 LAB
ALBUMIN SERPL-MCNC: 4.1 G/DL (ref 3.6–4.6)
ALBUMIN/GLOB SERPL: 1.5 {RATIO} (ref 1.2–2.2)
ALP SERPL-CCNC: 143 IU/L (ref 39–117)
ALT SERPL-CCNC: 25 IU/L (ref 0–32)
AST SERPL-CCNC: 31 IU/L (ref 0–40)
BASOPHILS # BLD AUTO: 0.1 X10E3/UL (ref 0–0.2)
BASOPHILS NFR BLD AUTO: 1 %
BILIRUB SERPL-MCNC: 0.6 MG/DL (ref 0–1.2)
BUN SERPL-MCNC: 10 MG/DL (ref 8–27)
BUN/CREAT SERPL: 12 (ref 12–28)
CALCIUM SERPL-MCNC: 10.5 MG/DL (ref 8.7–10.3)
CHLORIDE SERPL-SCNC: 102 MMOL/L (ref 96–106)
CHOLEST SERPL-MCNC: 178 MG/DL (ref 100–199)
CO2 SERPL-SCNC: 27 MMOL/L (ref 20–29)
CREAT SERPL-MCNC: 0.85 MG/DL (ref 0.57–1)
EOSINOPHIL # BLD AUTO: 0.1 X10E3/UL (ref 0–0.4)
EOSINOPHIL NFR BLD AUTO: 1 %
ERYTHROCYTE [DISTWIDTH] IN BLOOD BY AUTOMATED COUNT: 13.7 % (ref 11.7–15.4)
GLOBULIN SER CALC-MCNC: 2.7 G/DL (ref 1.5–4.5)
GLUCOSE SERPL-MCNC: 99 MG/DL (ref 65–99)
HCT VFR BLD AUTO: 45.6 % (ref 34–46.6)
HDLC SERPL-MCNC: 69 MG/DL
HGB BLD-MCNC: 15 G/DL (ref 11.1–15.9)
IMM GRANULOCYTES # BLD AUTO: 0 X10E3/UL (ref 0–0.1)
IMM GRANULOCYTES NFR BLD AUTO: 0 %
LDLC SERPL CALC-MCNC: 94 MG/DL (ref 0–99)
LYMPHOCYTES # BLD AUTO: 3.1 X10E3/UL (ref 0.7–3.1)
LYMPHOCYTES NFR BLD AUTO: 40 %
MCH RBC QN AUTO: 30.9 PG (ref 26.6–33)
MCHC RBC AUTO-ENTMCNC: 32.9 G/DL (ref 31.5–35.7)
MCV RBC AUTO: 94 FL (ref 79–97)
MONOCYTES # BLD AUTO: 0.8 X10E3/UL (ref 0.1–0.9)
MONOCYTES NFR BLD AUTO: 10 %
NEUTROPHILS # BLD AUTO: 3.6 X10E3/UL (ref 1.4–7)
NEUTROPHILS NFR BLD AUTO: 48 %
PLATELET # BLD AUTO: 347 X10E3/UL (ref 150–450)
POTASSIUM SERPL-SCNC: 4.1 MMOL/L (ref 3.5–5.2)
PROT SERPL-MCNC: 6.8 G/DL (ref 6–8.5)
RBC # BLD AUTO: 4.86 X10E6/UL (ref 3.77–5.28)
SODIUM SERPL-SCNC: 144 MMOL/L (ref 134–144)
TRIGL SERPL-MCNC: 84 MG/DL (ref 0–149)
URATE SERPL-MCNC: 3.3 MG/DL (ref 3.1–7.9)
VLDLC SERPL CALC-MCNC: 15 MG/DL (ref 5–40)
WBC # BLD AUTO: 7.6 X10E3/UL (ref 3.4–10.8)

## 2021-05-11 ENCOUNTER — LAB (OUTPATIENT)
Dept: LAB | Facility: HOSPITAL | Age: 81
End: 2021-05-11

## 2021-05-11 ENCOUNTER — HOSPITAL ENCOUNTER (OUTPATIENT)
Dept: ONCOLOGY | Facility: HOSPITAL | Age: 81
Setting detail: INFUSION SERIES
Discharge: HOME | End: 2021-05-11

## 2021-05-11 ENCOUNTER — TELEPHONE (OUTPATIENT)
Dept: FAMILY MEDICINE CLINIC | Facility: CLINIC | Age: 81
End: 2021-05-11

## 2021-05-11 DIAGNOSIS — R76.0 ANTICARDIOLIPIN ANTIBODY POSITIVE: ICD-10-CM

## 2021-05-11 DIAGNOSIS — Z79.01 LONG TERM (CURRENT) USE OF ANTICOAGULANTS: Primary | ICD-10-CM

## 2021-05-11 DIAGNOSIS — Z79.01 LONG TERM (CURRENT) USE OF ANTICOAGULANTS: ICD-10-CM

## 2021-05-11 PROCEDURE — 36416 COLLJ CAPILLARY BLOOD SPEC: CPT

## 2021-05-11 PROCEDURE — 85610 PROTHROMBIN TIME: CPT

## 2021-05-11 NOTE — TELEPHONE ENCOUNTER
Caller: Adelso Lynne    Relationship: Self    Best call back number: [phone]    What form or medical record are you requesting: MOST RECENT LABS    Who is requesting this form or medical record from you: SELF    How would you like to receive the form or medical records (pick-up, mail, fax): MAIL    If mail, what is the address: HOME ADDRESS    Additional notes: PLEASE SEND PATIENT A COPY OF HER MOST RECENT LAB RESULTS IN THE MAIL AND TO DR CLARKE

## 2021-05-16 DIAGNOSIS — Z79.01 ENCOUNTER FOR MONITORING COUMADIN THERAPY: ICD-10-CM

## 2021-05-16 DIAGNOSIS — Z51.81 ENCOUNTER FOR MONITORING COUMADIN THERAPY: ICD-10-CM

## 2021-05-17 RX ORDER — TRIAMTERENE AND HYDROCHLOROTHIAZIDE 75; 50 MG/1; MG/1
TABLET ORAL
Qty: 90 TABLET | Refills: 0 | Status: SHIPPED | OUTPATIENT
Start: 2021-05-17 | End: 2021-09-08

## 2021-05-17 RX ORDER — ALLOPURINOL 300 MG/1
TABLET ORAL
Qty: 90 TABLET | Refills: 0 | Status: SHIPPED | OUTPATIENT
Start: 2021-05-17 | End: 2021-09-08

## 2021-05-17 RX ORDER — WARFARIN SODIUM 3 MG/1
TABLET ORAL
Qty: 90 TABLET | Refills: 0 | Status: SHIPPED | OUTPATIENT
Start: 2021-05-17 | End: 2021-08-24

## 2021-05-17 RX ORDER — ATORVASTATIN CALCIUM 10 MG/1
TABLET, FILM COATED ORAL
Qty: 90 TABLET | Refills: 0 | Status: SHIPPED | OUTPATIENT
Start: 2021-05-17 | End: 2021-08-10 | Stop reason: SDUPTHER

## 2021-06-10 ENCOUNTER — HOSPITAL ENCOUNTER (OUTPATIENT)
Dept: ONCOLOGY | Facility: HOSPITAL | Age: 81
Setting detail: INFUSION SERIES
Discharge: HOME | End: 2021-06-10

## 2021-06-10 ENCOUNTER — LAB (OUTPATIENT)
Dept: LAB | Facility: HOSPITAL | Age: 81
End: 2021-06-10

## 2021-06-10 DIAGNOSIS — I26.99 OTHER PULMONARY EMBOLISM WITHOUT ACUTE COR PULMONALE, UNSPECIFIED CHRONICITY (HCC): ICD-10-CM

## 2021-06-10 DIAGNOSIS — I27.82 OTHER CHRONIC PULMONARY EMBOLISM WITHOUT ACUTE COR PULMONALE (HCC): ICD-10-CM

## 2021-06-10 DIAGNOSIS — Z79.01 LONG TERM (CURRENT) USE OF ANTICOAGULANTS: Primary | ICD-10-CM

## 2021-06-10 DIAGNOSIS — I82.220 IVC THROMBOSIS (HCC): Primary | ICD-10-CM

## 2021-06-10 DIAGNOSIS — R76.0 ANTICARDIOLIPIN ANTIBODY POSITIVE: ICD-10-CM

## 2021-06-10 DIAGNOSIS — Z79.01 LONG TERM (CURRENT) USE OF ANTICOAGULANTS: ICD-10-CM

## 2021-06-10 LAB
BASOPHILS # BLD AUTO: 0.04 10*3/MM3 (ref 0–0.2)
BASOPHILS NFR BLD AUTO: 0.5 % (ref 0–1.5)
DEPRECATED RDW RBC AUTO: 47.2 FL (ref 37–54)
EOSINOPHIL # BLD AUTO: 0.14 10*3/MM3 (ref 0–0.4)
EOSINOPHIL NFR BLD AUTO: 1.7 % (ref 0.3–6.2)
ERYTHROCYTE [DISTWIDTH] IN BLOOD BY AUTOMATED COUNT: 14.6 % (ref 12.3–15.4)
HCT VFR BLD AUTO: 44.8 % (ref 34–46.6)
HGB BLD-MCNC: 15.2 G/DL (ref 12–15.9)
INR PPP: 2.1 (ref 0.8–1.2)
LYMPHOCYTES # BLD AUTO: 3.99 10*3/MM3 (ref 0.7–3.1)
LYMPHOCYTES NFR BLD AUTO: 49.3 % (ref 19.6–45.3)
MCH RBC QN AUTO: 31 PG (ref 26.6–33)
MCHC RBC AUTO-ENTMCNC: 33.9 G/DL (ref 31.5–35.7)
MCV RBC AUTO: 91.2 FL (ref 79–97)
MONOCYTES # BLD AUTO: 0.82 10*3/MM3 (ref 0.1–0.9)
MONOCYTES NFR BLD AUTO: 10.1 % (ref 5–12)
NEUTROPHILS NFR BLD AUTO: 3.1 10*3/MM3 (ref 1.7–7)
NEUTROPHILS NFR BLD AUTO: 38.4 % (ref 42.7–76)
PLATELET # BLD AUTO: 121 10*3/MM3 (ref 140–450)
PMV BLD AUTO: 9.8 FL (ref 6–12)
RBC # BLD AUTO: 4.91 10*6/MM3 (ref 3.77–5.28)
WBC # BLD AUTO: 8.09 10*3/MM3 (ref 3.4–10.8)

## 2021-06-10 PROCEDURE — 85025 COMPLETE CBC W/AUTO DIFF WBC: CPT

## 2021-06-10 PROCEDURE — 85610 PROTHROMBIN TIME: CPT

## 2021-06-10 PROCEDURE — 36416 COLLJ CAPILLARY BLOOD SPEC: CPT

## 2021-07-11 RX ORDER — POTASSIUM CHLORIDE 20 MEQ/1
TABLET, EXTENDED RELEASE ORAL
Qty: 90 TABLET | Refills: 0 | Status: SHIPPED | OUTPATIENT
Start: 2021-07-11 | End: 2021-10-07

## 2021-07-13 ENCOUNTER — LAB (OUTPATIENT)
Dept: LAB | Facility: HOSPITAL | Age: 81
End: 2021-07-13

## 2021-07-13 ENCOUNTER — HOSPITAL ENCOUNTER (OUTPATIENT)
Dept: ONCOLOGY | Facility: HOSPITAL | Age: 81
Setting detail: INFUSION SERIES
Discharge: HOME | End: 2021-07-13

## 2021-07-13 DIAGNOSIS — I26.99 OTHER PULMONARY EMBOLISM WITHOUT ACUTE COR PULMONALE, UNSPECIFIED CHRONICITY (HCC): ICD-10-CM

## 2021-07-13 DIAGNOSIS — Z79.01 LONG TERM (CURRENT) USE OF ANTICOAGULANTS: Primary | ICD-10-CM

## 2021-07-13 DIAGNOSIS — R76.0 ANTICARDIOLIPIN ANTIBODY POSITIVE: ICD-10-CM

## 2021-07-13 DIAGNOSIS — I82.220 IVC THROMBOSIS (HCC): ICD-10-CM

## 2021-07-13 DIAGNOSIS — Z79.01 LONG TERM (CURRENT) USE OF ANTICOAGULANTS: ICD-10-CM

## 2021-07-13 DIAGNOSIS — I27.82 OTHER CHRONIC PULMONARY EMBOLISM WITHOUT ACUTE COR PULMONALE (HCC): ICD-10-CM

## 2021-07-13 LAB
BASOPHILS # BLD AUTO: 0.03 10*3/MM3 (ref 0–0.2)
BASOPHILS NFR BLD AUTO: 0.4 % (ref 0–1.5)
DEPRECATED RDW RBC AUTO: 47.2 FL (ref 37–54)
EOSINOPHIL # BLD AUTO: 0.19 10*3/MM3 (ref 0–0.4)
EOSINOPHIL NFR BLD AUTO: 2.3 % (ref 0.3–6.2)
ERYTHROCYTE [DISTWIDTH] IN BLOOD BY AUTOMATED COUNT: 14.6 % (ref 12.3–15.4)
HCT VFR BLD AUTO: 44.7 % (ref 34–46.6)
HGB BLD-MCNC: 15.2 G/DL (ref 12–15.9)
INR PPP: 1.7 (ref 0.8–1.2)
LYMPHOCYTES # BLD AUTO: 3.69 10*3/MM3 (ref 0.7–3.1)
LYMPHOCYTES NFR BLD AUTO: 44.7 % (ref 19.6–45.3)
MCH RBC QN AUTO: 30.8 PG (ref 26.6–33)
MCHC RBC AUTO-ENTMCNC: 34 G/DL (ref 31.5–35.7)
MCV RBC AUTO: 90.5 FL (ref 79–97)
MONOCYTES # BLD AUTO: 0.86 10*3/MM3 (ref 0.1–0.9)
MONOCYTES NFR BLD AUTO: 10.4 % (ref 5–12)
NEUTROPHILS NFR BLD AUTO: 3.48 10*3/MM3 (ref 1.7–7)
NEUTROPHILS NFR BLD AUTO: 42.2 % (ref 42.7–76)
PLATELET # BLD AUTO: 283 10*3/MM3 (ref 140–450)
PMV BLD AUTO: 10.3 FL (ref 6–12)
RBC # BLD AUTO: 4.94 10*6/MM3 (ref 3.77–5.28)
WBC # BLD AUTO: 8.25 10*3/MM3 (ref 3.4–10.8)

## 2021-07-13 PROCEDURE — 36416 COLLJ CAPILLARY BLOOD SPEC: CPT

## 2021-07-13 PROCEDURE — 85610 PROTHROMBIN TIME: CPT

## 2021-07-13 PROCEDURE — 85025 COMPLETE CBC W/AUTO DIFF WBC: CPT

## 2021-07-20 ENCOUNTER — HOSPITAL ENCOUNTER (OUTPATIENT)
Dept: ONCOLOGY | Facility: HOSPITAL | Age: 81
Setting detail: INFUSION SERIES
Discharge: HOME | End: 2021-07-20

## 2021-07-20 ENCOUNTER — LAB (OUTPATIENT)
Dept: LAB | Facility: HOSPITAL | Age: 81
End: 2021-07-20

## 2021-07-20 DIAGNOSIS — Z79.01 LONG TERM (CURRENT) USE OF ANTICOAGULANTS: ICD-10-CM

## 2021-07-20 DIAGNOSIS — Z79.01 LONG TERM (CURRENT) USE OF ANTICOAGULANTS: Primary | ICD-10-CM

## 2021-07-20 DIAGNOSIS — R76.0 ANTICARDIOLIPIN ANTIBODY POSITIVE: ICD-10-CM

## 2021-07-20 LAB — INR PPP: 2.2 (ref 0.8–1.2)

## 2021-07-20 PROCEDURE — 85610 PROTHROMBIN TIME: CPT

## 2021-07-20 PROCEDURE — 36416 COLLJ CAPILLARY BLOOD SPEC: CPT

## 2021-07-27 ENCOUNTER — HOSPITAL ENCOUNTER (OUTPATIENT)
Dept: ONCOLOGY | Facility: HOSPITAL | Age: 81
Setting detail: INFUSION SERIES
Discharge: HOME | End: 2021-07-27

## 2021-07-27 ENCOUNTER — LAB (OUTPATIENT)
Dept: LAB | Facility: HOSPITAL | Age: 81
End: 2021-07-27

## 2021-07-27 DIAGNOSIS — Z79.01 LONG TERM (CURRENT) USE OF ANTICOAGULANTS: ICD-10-CM

## 2021-07-27 DIAGNOSIS — Z79.01 LONG TERM (CURRENT) USE OF ANTICOAGULANTS: Primary | ICD-10-CM

## 2021-07-27 LAB — INR PPP: 2.7 (ref 0.8–1.2)

## 2021-07-27 PROCEDURE — 36416 COLLJ CAPILLARY BLOOD SPEC: CPT

## 2021-07-27 PROCEDURE — 85610 PROTHROMBIN TIME: CPT

## 2021-07-27 NOTE — PROGRESS NOTES
INR within range today for the second week in a row. Patient to continue 4mg and recheck in one month, next appt given.

## 2021-08-03 ENCOUNTER — APPOINTMENT (OUTPATIENT)
Dept: LAB | Facility: HOSPITAL | Age: 81
End: 2021-08-03

## 2021-08-10 RX ORDER — ATORVASTATIN CALCIUM 10 MG/1
TABLET, FILM COATED ORAL
Qty: 90 TABLET | Refills: 0 | Status: SHIPPED | OUTPATIENT
Start: 2021-08-10 | End: 2021-11-15

## 2021-08-11 ENCOUNTER — LAB (OUTPATIENT)
Dept: LAB | Facility: HOSPITAL | Age: 81
End: 2021-08-11

## 2021-08-11 ENCOUNTER — OFFICE VISIT (OUTPATIENT)
Dept: ONCOLOGY | Facility: CLINIC | Age: 81
End: 2021-08-11

## 2021-08-11 ENCOUNTER — HOSPITAL ENCOUNTER (OUTPATIENT)
Dept: ONCOLOGY | Facility: HOSPITAL | Age: 81
Setting detail: INFUSION SERIES
Discharge: HOME | End: 2021-08-11

## 2021-08-11 VITALS
RESPIRATION RATE: 20 BRPM | HEART RATE: 120 BPM | BODY MASS INDEX: 34.23 KG/M2 | SYSTOLIC BLOOD PRESSURE: 131 MMHG | OXYGEN SATURATION: 97 % | HEIGHT: 66 IN | DIASTOLIC BLOOD PRESSURE: 70 MMHG | WEIGHT: 213 LBS | TEMPERATURE: 97.7 F

## 2021-08-11 DIAGNOSIS — Z79.01 LONG TERM (CURRENT) USE OF ANTICOAGULANTS: ICD-10-CM

## 2021-08-11 DIAGNOSIS — I82.220 IVC THROMBOSIS (HCC): ICD-10-CM

## 2021-08-11 DIAGNOSIS — I82.220 IVC THROMBOSIS (HCC): Primary | ICD-10-CM

## 2021-08-11 DIAGNOSIS — Z79.01 LONG TERM (CURRENT) USE OF ANTICOAGULANTS: Primary | ICD-10-CM

## 2021-08-11 DIAGNOSIS — I26.99 OTHER PULMONARY EMBOLISM WITHOUT ACUTE COR PULMONALE, UNSPECIFIED CHRONICITY (HCC): ICD-10-CM

## 2021-08-11 LAB
BASOPHILS # BLD AUTO: 0.02 10*3/MM3 (ref 0–0.2)
BASOPHILS NFR BLD AUTO: 0.3 % (ref 0–1.5)
DEPRECATED RDW RBC AUTO: 48.2 FL (ref 37–54)
EOSINOPHIL # BLD AUTO: 0.11 10*3/MM3 (ref 0–0.4)
EOSINOPHIL NFR BLD AUTO: 1.5 % (ref 0.3–6.2)
ERYTHROCYTE [DISTWIDTH] IN BLOOD BY AUTOMATED COUNT: 14.8 % (ref 12.3–15.4)
HCT VFR BLD AUTO: 43.2 % (ref 34–46.6)
HGB BLD-MCNC: 14.7 G/DL (ref 12–15.9)
INR PPP: 2.7 (ref 0.8–1.2)
LYMPHOCYTES # BLD AUTO: 2.97 10*3/MM3 (ref 0.7–3.1)
LYMPHOCYTES NFR BLD AUTO: 41.4 % (ref 19.6–45.3)
MCH RBC QN AUTO: 31.3 PG (ref 26.6–33)
MCHC RBC AUTO-ENTMCNC: 34 G/DL (ref 31.5–35.7)
MCV RBC AUTO: 91.9 FL (ref 79–97)
MONOCYTES # BLD AUTO: 0.77 10*3/MM3 (ref 0.1–0.9)
MONOCYTES NFR BLD AUTO: 10.7 % (ref 5–12)
NEUTROPHILS NFR BLD AUTO: 3.3 10*3/MM3 (ref 1.7–7)
NEUTROPHILS NFR BLD AUTO: 46.1 % (ref 42.7–76)
PLATELET # BLD AUTO: 319 10*3/MM3 (ref 140–450)
PMV BLD AUTO: 9.4 FL (ref 6–12)
RBC # BLD AUTO: 4.7 10*6/MM3 (ref 3.77–5.28)
WBC # BLD AUTO: 7.17 10*3/MM3 (ref 3.4–10.8)

## 2021-08-11 PROCEDURE — 85610 PROTHROMBIN TIME: CPT

## 2021-08-11 PROCEDURE — 99213 OFFICE O/P EST LOW 20 MIN: CPT | Performed by: INTERNAL MEDICINE

## 2021-08-11 PROCEDURE — 36415 COLL VENOUS BLD VENIPUNCTURE: CPT

## 2021-08-11 PROCEDURE — 85025 COMPLETE CBC W/AUTO DIFF WBC: CPT

## 2021-08-11 RX ORDER — WARFARIN SODIUM 1 MG/1
1 TABLET ORAL NIGHTLY
Qty: 30 TABLET | Refills: 5 | Status: SHIPPED | OUTPATIENT
Start: 2021-08-11 | End: 2022-02-01

## 2021-08-11 NOTE — PROGRESS NOTES
Hematology/Oncology Outpatient Follow Up    PATIENT NAME:Adelso Lynne  :1940  MRN: [id number]  PRIMARY CARE PHYSICIAN: Lyell, Reggie Duane, MD  REFERRING PHYSICIAN: Lyell, Reggie Duane, MD    Chief Complaint   Patient presents with   • Follow-up     IVC thrombosis     HISTORY OF PRESENT ILLNESS:     This is an 81 y.o.female who has a history of pulmonary embolism at the age of 25 [more than 50 years ago].  Subsequently she developed deep venous thrombosis and patient was treated with warfarin therapy at the time.  She has a strong family  history of blood clots in her mother, brother, son and sister, but there is no known mutation yet identified in the family.  Patient was seen by Amalia Salamanca with [facility] and had an MRI of the abdomen to evaluate elevated liver function tests.  The liver showed evidence of fatty infiltration, but there was no mass identified.  The kidneys were normal.  The spleen did not show any abnormalities, as well as the pancreas.  There was a filling defect noted in the inferior vena cava below the renal veins, thought to represent some partial thrombosis.  The patient was then placed on Lovenox 40 mg subcutaneously daily and then referred for further evaluation and management of her thrombophilia.  Patient denies any new symptoms such as weight loss, night sweats, or fatigue.  She was able to carry out her own activities of daily living.  Her last colonoscopy was about three to four years ago and she is up to date on her routine mammograms.  Patient was accompanied by her daughter today for that appointment.      • Since her last visit on 16, patient was initiated on anticoagulation with warfarin.  Anticardiolipin IgG antibody was slightly high at 24 [normal < 23], beta-2 glycoprotein was negative.  Factor V Leiden was not mutated.  Prothrombin gene was not mutated as well.  PT 12.4, INR 1, PTT 27.  Protein C activity (N) 129.  Antithrombin III activity (N) 99.  Protein S  activity (N) 85%.  · 8/27/2020  -duplex venous lower extremity bilateral - chronic left lower extremity deep vein thrombosis noted in the common femoral, deep femoral, proximal femoral, mid femoral and gastrocnemius.All other veins appeared normal bilaterally. Imaging reviewed by Anushka Patel MD and patient was continued on warfarin.  INR was therapeutic.   • 11/19/2020 Patient continued on warfarin.     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   • Pulmonary embolism (CMS/HCC)        Past Surgical History:   Procedure Laterality Date   • ANKLE SURGERY      due to fracture   • CYST REMOVAL      from the left wrist    • HYSTERECTOMY           Current Outpatient Medications:   •  allopurinol (ZYLOPRIM) 300 MG tablet, Take 1 tablet by mouth once daily, Disp: 90 tablet, Rfl: 0  •  atorvastatin (LIPITOR) 10 MG tablet, Take 1 tablet by mouth in the evening, Disp: 90 tablet, Rfl: 0  •  Cholecalciferol (VITAMIN D) 2000 units tablet, Take 2,000 Units by mouth Daily., Disp: , Rfl:   •  Multiple Vitamins-Minerals (MULTIVITAMIN WITH MINERALS) tablet tablet, Take 1 tablet by mouth Daily., Disp: , Rfl:   •  potassium chloride (K-DUR,KLOR-CON) 20 MEQ CR tablet, Take 1 tablet by mouth once daily, Disp: 90 tablet, Rfl: 0  •  triamterene-hydrochlorothiazide (MAXZIDE) 75-50 MG per tablet, Take 1 tablet by mouth once daily, Disp: 90 tablet, Rfl: 0  •  warfarin (COUMADIN) 1 MG tablet, Take 1 tablet by mouth Every Night. At 1700 or as directed., Disp: 30 tablet, Rfl: 5  •  warfarin (COUMADIN) 3 MG tablet, TAKE 1 TABLET BY MOUTH NIGHTLY AT  1700  OR  AS  DIRECTED, Disp: 90 tablet, Rfl: 0    Allergies   Allergen Reactions   • Codeine Shortness Of Breath, Rash and Unknown (See Comments)   • Hydroxychloroquine Rash     Light flashes spider web in right eye and black dot in left eye       Family History   Problem  Relation Age of Onset   • Breast cancer Daughter         onset in 40s   • Prostate cancer Son         onset in 60s   • Colon cancer Other        Cancer-related family history includes Breast cancer in her daughter; Colon cancer in an other family member; Prostate cancer in her son.    Social History     Tobacco Use   • Smoking status: Never Smoker   • Smokeless tobacco: Never Used   Substance Use Topics   • Alcohol use: Never   • Drug use: Never       HPI, ROS and PFSH have been reviewed and confirmed on 8/11/2021.     SUBJECTIVE:    Patient does not have any specific complaints today.  She remains on warfarin.  She does not have any bleeding issues.        REVIEW OF SYSTEMS:     Review of Systems   Constitutional: Negative for appetite change, chills, fatigue, fever and unexpected weight change.   HENT: Negative for congestion, hearing loss, mouth sores, nosebleeds, postnasal drip, rhinorrhea and sore throat.    Eyes: Negative for photophobia, pain and visual disturbance.   Respiratory: Negative for cough, chest tightness, shortness of breath and wheezing.    Cardiovascular: Negative for chest pain, palpitations and leg swelling ( RLE - chronic ).   Gastrointestinal: Negative for abdominal distention, abdominal pain, constipation, diarrhea, nausea and vomiting.   Genitourinary: Negative for difficulty urinating, dysuria and hematuria.   Musculoskeletal: Positive for gait problem ( uses cane ). Negative for arthralgias, back pain and myalgias.   Skin: Negative for pallor and rash.   Neurological: Negative for dizziness, weakness, light-headedness, numbness and headaches.   Hematological: Negative for adenopathy. Does not bruise/bleed easily.   Psychiatric/Behavioral: Negative for agitation, confusion and dysphoric mood. The patient is not nervous/anxious.    All other systems reviewed and are negative.    OBJECTIVE:  Vitals:    08/11/21 1241   BP: 131/70   Pulse: 120   Resp: 20   Temp: 97.7 °F (36.5 °C)   TempSrc:  "Infrared   SpO2: 97%   Weight: 96.6 kg (213 lb)   Height: 167.6 cm (66\")   PainSc:   1   PainLoc: Comment: right foot     ECOG  (1) Restricted in physically strenuous activity, ambulatory and able to do work of light nature    Physical Exam   Constitutional: She is oriented to person, place, and time. No distress.   HENT:   Head: Normocephalic and atraumatic.   Eyes: Conjunctivae are normal. Right eye exhibits no discharge. Left eye exhibits no discharge. No scleral icterus.   Neck: No thyromegaly present.   Cardiovascular: Normal rate, regular rhythm and normal heart sounds. Exam reveals no gallop and no friction rub.   Pulmonary/Chest: Effort normal. No stridor. No respiratory distress. She has no wheezes.   Abdominal: Soft. Bowel sounds are normal. She exhibits no mass. There is no abdominal tenderness. There is no rebound and no guarding.   Musculoskeletal: Normal range of motion. No tenderness.      Comments: Right lower extremity   Lymphadenopathy:     She has no cervical adenopathy.   Neurological: She is alert and oriented to person, place, and time. She exhibits normal muscle tone. Gait ( ambulates with cane ) abnormal.   Stooped posture    Skin: Skin is warm. No rash noted. She is not diaphoretic. No erythema.   Psychiatric: Her behavior is normal.   Nursing note and vitals reviewed.      I have reexamined the patient and the results are consistent with the previously documented exam. Anushka Patel MD     RECENT LABS    WBC   Date Value Ref Range Status   08/11/2021 7.17 3.40 - 10.80 10*3/mm3 Final   04/29/2021 7.6 3.4 - 10.8 x10E3/uL Final     RBC   Date Value Ref Range Status   08/11/2021 4.70 3.77 - 5.28 10*6/mm3 Final   04/29/2021 4.86 3.77 - 5.28 x10E6/uL Final     Hemoglobin   Date Value Ref Range Status   08/11/2021 14.7 12.0 - 15.9 g/dL Final     Hematocrit   Date Value Ref Range Status   08/11/2021 43.2 34.0 - 46.6 % Final     MCV   Date Value Ref Range Status   08/11/2021 91.9 79.0 - " 97.0 fL Final     MCH   Date Value Ref Range Status   08/11/2021 31.3 26.6 - 33.0 pg Final     MCHC   Date Value Ref Range Status   08/11/2021 34.0 31.5 - 35.7 g/dL Final     RDW   Date Value Ref Range Status   08/11/2021 14.8 12.3 - 15.4 % Final     RDW-SD   Date Value Ref Range Status   08/11/2021 48.2 37.0 - 54.0 fl Final     MPV   Date Value Ref Range Status   08/11/2021 9.4 6.0 - 12.0 fL Final     Platelets   Date Value Ref Range Status   08/11/2021 319 140 - 450 10*3/mm3 Final     Neutrophil %   Date Value Ref Range Status   08/11/2021 46.1 42.7 - 76.0 % Final     Lymphocyte %   Date Value Ref Range Status   08/11/2021 41.4 19.6 - 45.3 % Final     Monocyte %   Date Value Ref Range Status   08/11/2021 10.7 5.0 - 12.0 % Final     Eosinophil %   Date Value Ref Range Status   08/11/2021 1.5 0.3 - 6.2 % Final     Basophil %   Date Value Ref Range Status   08/11/2021 0.3 0.0 - 1.5 % Final     Neutrophils, Absolute   Date Value Ref Range Status   08/11/2021 3.30 1.70 - 7.00 10*3/mm3 Final     Lymphocytes, Absolute   Date Value Ref Range Status   08/11/2021 2.97 0.70 - 3.10 10*3/mm3 Final     Monocytes, Absolute   Date Value Ref Range Status   08/11/2021 0.77 0.10 - 0.90 10*3/mm3 Final     Eosinophils, Absolute   Date Value Ref Range Status   08/11/2021 0.11 0.00 - 0.40 10*3/mm3 Final     Basophils, Absolute   Date Value Ref Range Status   08/11/2021 0.02 0.00 - 0.20 10*3/mm3 Final     nRBC   Date Value Ref Range Status   06/22/2019 0.1 0.0 - 0.2 /100 WBC Final       Lab Results   Component Value Date    GLUCOSE 88 11/19/2020    BUN 10 04/29/2021    CREATININE 0.85 04/29/2021    EGFRIFNONA 64 04/29/2021    EGFRIFAFRI 74 04/29/2021    BCR 12 04/29/2021    K 4.1 04/29/2021    CO2 27 04/29/2021    CALCIUM 10.5 (H) 04/29/2021    PROTENTOTREF 6.8 04/29/2021    ALBUMIN 4.1 04/29/2021    LABIL2 1.5 04/29/2021    AST 31 04/29/2021    ALT 25 04/29/2021     Assessment/Plan   IVC thrombosis (CMS/HCC)  - CBC & Differential  -  Protime-INR, Fingerstick    ASSESSMENT:    1. Left lower extremity DVT- 8/27/2020 Chronic left lower extremity deep vein thrombosis noted in the common femoral, deep femoral, proximal femoral, mid femoral and gastrocnemius. All other veins appeared normal bilaterally.  2. 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  On anticoagulation therapy  3. History of pulmonary embolism and deep venous thrombosis in the past.  On anticoagulation therapy with warfarin, lifelong.  4. Strong family history of thrombophilia in multiple first degree relatives.   5. Anticardiolipin IgG antibody, slightly elevated at 24.   6. Continue to monitor her INRs    PLANS:    1. CBC reviewed  2.  INR today  3. Patient to notify the office if she has surgery scheduled and will be bridged with Lovenox  4. Recommended immunization with influenza vaccination - patient has had already   5. Advised to continue social isolation/distancing due to the ongoing coronavirus pandemic.              I have reviewed labs results, imaging, vitals, and medications with the patient today. Will follow up in 4 months

## 2021-08-24 DIAGNOSIS — Z51.81 ENCOUNTER FOR MONITORING COUMADIN THERAPY: ICD-10-CM

## 2021-08-24 DIAGNOSIS — Z79.01 ENCOUNTER FOR MONITORING COUMADIN THERAPY: ICD-10-CM

## 2021-08-24 RX ORDER — WARFARIN SODIUM 3 MG/1
TABLET ORAL
Qty: 90 TABLET | Refills: 0 | Status: SHIPPED | OUTPATIENT
Start: 2021-08-24 | End: 2021-11-17 | Stop reason: SDUPTHER

## 2021-08-27 ENCOUNTER — TELEPHONE (OUTPATIENT)
Dept: ONCOLOGY | Facility: HOSPITAL | Age: 81
End: 2021-08-27

## 2021-09-08 RX ORDER — TRIAMTERENE AND HYDROCHLOROTHIAZIDE 75; 50 MG/1; MG/1
TABLET ORAL
Qty: 90 TABLET | Refills: 0 | Status: SHIPPED | OUTPATIENT
Start: 2021-09-08 | End: 2021-12-18

## 2021-09-08 RX ORDER — ALLOPURINOL 300 MG/1
TABLET ORAL
Qty: 90 TABLET | Refills: 0 | Status: SHIPPED | OUTPATIENT
Start: 2021-09-08 | End: 2021-12-13

## 2021-09-13 ENCOUNTER — HOSPITAL ENCOUNTER (OUTPATIENT)
Dept: ONCOLOGY | Facility: HOSPITAL | Age: 81
Setting detail: INFUSION SERIES
Discharge: HOME | End: 2021-09-13

## 2021-09-13 ENCOUNTER — LAB (OUTPATIENT)
Dept: LAB | Facility: HOSPITAL | Age: 81
End: 2021-09-13

## 2021-09-13 DIAGNOSIS — I26.99 OTHER PULMONARY EMBOLISM WITHOUT ACUTE COR PULMONALE, UNSPECIFIED CHRONICITY (HCC): ICD-10-CM

## 2021-09-13 DIAGNOSIS — Z79.01 LONG TERM (CURRENT) USE OF ANTICOAGULANTS: ICD-10-CM

## 2021-09-13 DIAGNOSIS — Z79.01 LONG TERM (CURRENT) USE OF ANTICOAGULANTS: Primary | ICD-10-CM

## 2021-09-13 LAB
BASOPHILS # BLD AUTO: 0.05 10*3/MM3 (ref 0–0.2)
BASOPHILS NFR BLD AUTO: 0.6 % (ref 0–1.5)
DEPRECATED RDW RBC AUTO: 47.4 FL (ref 37–54)
EOSINOPHIL # BLD AUTO: 0.16 10*3/MM3 (ref 0–0.4)
EOSINOPHIL NFR BLD AUTO: 1.8 % (ref 0.3–6.2)
ERYTHROCYTE [DISTWIDTH] IN BLOOD BY AUTOMATED COUNT: 14.9 % (ref 12.3–15.4)
HCT VFR BLD AUTO: 45.2 % (ref 34–46.6)
HGB BLD-MCNC: 15.6 G/DL (ref 12–15.9)
INR PPP: 2.3 (ref 0.8–1.2)
LYMPHOCYTES # BLD AUTO: 3.21 10*3/MM3 (ref 0.7–3.1)
LYMPHOCYTES NFR BLD AUTO: 36.9 % (ref 19.6–45.3)
MCH RBC QN AUTO: 30.7 PG (ref 26.6–33)
MCHC RBC AUTO-ENTMCNC: 34.5 G/DL (ref 31.5–35.7)
MCV RBC AUTO: 89 FL (ref 79–97)
MONOCYTES # BLD AUTO: 0.77 10*3/MM3 (ref 0.1–0.9)
MONOCYTES NFR BLD AUTO: 8.9 % (ref 5–12)
NEUTROPHILS NFR BLD AUTO: 4.5 10*3/MM3 (ref 1.7–7)
NEUTROPHILS NFR BLD AUTO: 51.8 % (ref 42.7–76)
PLATELET # BLD AUTO: 326 10*3/MM3 (ref 140–450)
PMV BLD AUTO: 9.9 FL (ref 6–12)
RBC # BLD AUTO: 5.08 10*6/MM3 (ref 3.77–5.28)
WBC # BLD AUTO: 8.69 10*3/MM3 (ref 3.4–10.8)

## 2021-09-13 PROCEDURE — 85610 PROTHROMBIN TIME: CPT

## 2021-09-13 PROCEDURE — 85025 COMPLETE CBC W/AUTO DIFF WBC: CPT

## 2021-09-13 PROCEDURE — 36416 COLLJ CAPILLARY BLOOD SPEC: CPT

## 2021-10-07 RX ORDER — POTASSIUM CHLORIDE 20 MEQ/1
TABLET, EXTENDED RELEASE ORAL
Qty: 90 TABLET | Refills: 0 | Status: SHIPPED | OUTPATIENT
Start: 2021-10-07 | End: 2022-01-09

## 2021-10-11 ENCOUNTER — APPOINTMENT (OUTPATIENT)
Dept: ONCOLOGY | Facility: HOSPITAL | Age: 81
End: 2021-10-11

## 2021-10-11 ENCOUNTER — APPOINTMENT (OUTPATIENT)
Dept: LAB | Facility: HOSPITAL | Age: 81
End: 2021-10-11

## 2021-10-12 ENCOUNTER — HOSPITAL ENCOUNTER (OUTPATIENT)
Dept: ONCOLOGY | Facility: HOSPITAL | Age: 81
Setting detail: INFUSION SERIES
Discharge: HOME | End: 2021-10-12

## 2021-10-12 ENCOUNTER — LAB (OUTPATIENT)
Dept: LAB | Facility: HOSPITAL | Age: 81
End: 2021-10-12

## 2021-10-12 DIAGNOSIS — Z79.01 LONG TERM (CURRENT) USE OF ANTICOAGULANTS: ICD-10-CM

## 2021-10-12 DIAGNOSIS — Z79.01 LONG TERM (CURRENT) USE OF ANTICOAGULANTS: Primary | ICD-10-CM

## 2021-10-12 LAB — INR PPP: 1.8 (ref 0.8–1.2)

## 2021-10-12 PROCEDURE — 85610 PROTHROMBIN TIME: CPT

## 2021-10-12 PROCEDURE — 36416 COLLJ CAPILLARY BLOOD SPEC: CPT

## 2021-10-12 NOTE — PROGRESS NOTES
INR 1.80 today. Pt reports eating salad and green beans last evening. Pt to take warfarin 4.5 tonight and then 4 mg daily stating tomorrow. She'll return in 1 week for INR check.

## 2021-10-19 ENCOUNTER — HOSPITAL ENCOUNTER (OUTPATIENT)
Dept: ONCOLOGY | Facility: HOSPITAL | Age: 81
Setting detail: INFUSION SERIES
Discharge: HOME | End: 2021-10-19

## 2021-10-19 ENCOUNTER — LAB (OUTPATIENT)
Dept: LAB | Facility: HOSPITAL | Age: 81
End: 2021-10-19

## 2021-10-19 DIAGNOSIS — Z79.01 LONG TERM (CURRENT) USE OF ANTICOAGULANTS: Primary | ICD-10-CM

## 2021-10-19 DIAGNOSIS — Z79.01 LONG TERM (CURRENT) USE OF ANTICOAGULANTS: ICD-10-CM

## 2021-10-19 LAB — INR PPP: 2.7 (ref 0.8–1.2)

## 2021-10-19 PROCEDURE — 36416 COLLJ CAPILLARY BLOOD SPEC: CPT

## 2021-10-19 PROCEDURE — 85610 PROTHROMBIN TIME: CPT

## 2021-10-26 ENCOUNTER — HOSPITAL ENCOUNTER (OUTPATIENT)
Dept: ONCOLOGY | Facility: HOSPITAL | Age: 81
Setting detail: INFUSION SERIES
Discharge: HOME | End: 2021-10-26

## 2021-10-26 ENCOUNTER — LAB (OUTPATIENT)
Dept: LAB | Facility: HOSPITAL | Age: 81
End: 2021-10-26

## 2021-10-26 DIAGNOSIS — Z79.01 LONG TERM (CURRENT) USE OF ANTICOAGULANTS: ICD-10-CM

## 2021-10-26 DIAGNOSIS — Z79.01 LONG TERM (CURRENT) USE OF ANTICOAGULANTS: Primary | ICD-10-CM

## 2021-10-26 LAB — INR PPP: 2.6 (ref 0.8–1.2)

## 2021-10-26 PROCEDURE — 36416 COLLJ CAPILLARY BLOOD SPEC: CPT

## 2021-10-26 PROCEDURE — 85610 PROTHROMBIN TIME: CPT

## 2021-11-15 RX ORDER — ATORVASTATIN CALCIUM 10 MG/1
TABLET, FILM COATED ORAL
Qty: 90 TABLET | Refills: 0 | Status: SHIPPED | OUTPATIENT
Start: 2021-11-15 | End: 2022-02-25 | Stop reason: SDUPTHER

## 2021-11-16 ENCOUNTER — TELEPHONE (OUTPATIENT)
Dept: FAMILY MEDICINE CLINIC | Facility: CLINIC | Age: 81
End: 2021-11-16

## 2021-11-16 NOTE — TELEPHONE ENCOUNTER
Caller: Adelso Lynne    Relationship to patient: Self    Best call back number: [phone] (H)      Patient is needing:PATEINT CALLED IN TO SEE IF SHE COULD GET A LIST OF HER MEDICATIONS SENT TO THE ADDRESS LISTED BELOW.     [address] [facility] [id number]    PLEASE ADVISE. THANK YOU

## 2021-11-17 ENCOUNTER — TELEPHONE (OUTPATIENT)
Dept: ONCOLOGY | Facility: CLINIC | Age: 81
End: 2021-11-17

## 2021-11-17 DIAGNOSIS — Z79.01 ENCOUNTER FOR MONITORING COUMADIN THERAPY: ICD-10-CM

## 2021-11-17 DIAGNOSIS — Z51.81 ENCOUNTER FOR MONITORING COUMADIN THERAPY: ICD-10-CM

## 2021-11-17 RX ORDER — WARFARIN SODIUM 3 MG/1
TABLET ORAL
Qty: 90 TABLET | Refills: 0 | Status: SHIPPED | OUTPATIENT
Start: 2021-11-17 | End: 2021-11-24 | Stop reason: SDUPTHER

## 2021-11-17 NOTE — TELEPHONE ENCOUNTER
Caller: Adelso Lynne    Relationship: Self    Best call back number: [phone], PLEASE CALL PATIENT WHEN PRESP. IS SENT TO PHARMACY.    Requested Prescriptions:   Requested Prescriptions     Pending Prescriptions Disp Refills   • warfarin (COUMADIN) 3 MG tablet 90 tablet 0        Pharmacy where request should be sent: [address] - [phone] [facility] [phone] FX     Does the patient have less than a 3 day supply:  [] Yes  [x] No    Josselyn Marcum Rep   11/17/21 14:08 EST

## 2021-11-23 ENCOUNTER — HOSPITAL ENCOUNTER (OUTPATIENT)
Dept: ONCOLOGY | Facility: HOSPITAL | Age: 81
Setting detail: INFUSION SERIES
Discharge: HOME | End: 2021-11-23

## 2021-11-23 ENCOUNTER — LAB (OUTPATIENT)
Dept: LAB | Facility: HOSPITAL | Age: 81
End: 2021-11-23

## 2021-11-23 DIAGNOSIS — Z79.01 LONG TERM (CURRENT) USE OF ANTICOAGULANTS: Primary | ICD-10-CM

## 2021-11-23 DIAGNOSIS — Z79.01 LONG TERM (CURRENT) USE OF ANTICOAGULANTS: ICD-10-CM

## 2021-11-23 LAB — INR PPP: 2.3 (ref 0.8–1.2)

## 2021-11-23 PROCEDURE — 85610 PROTHROMBIN TIME: CPT

## 2021-11-23 PROCEDURE — 36416 COLLJ CAPILLARY BLOOD SPEC: CPT

## 2021-11-24 DIAGNOSIS — Z51.81 ENCOUNTER FOR MONITORING COUMADIN THERAPY: ICD-10-CM

## 2021-11-24 DIAGNOSIS — Z79.01 ENCOUNTER FOR MONITORING COUMADIN THERAPY: ICD-10-CM

## 2021-11-24 RX ORDER — WARFARIN SODIUM 3 MG/1
TABLET ORAL
Qty: 90 TABLET | Refills: 0 | Status: SHIPPED | OUTPATIENT
Start: 2021-11-24 | End: 2022-04-22 | Stop reason: SDUPTHER

## 2021-12-13 RX ORDER — ALLOPURINOL 300 MG/1
TABLET ORAL
Qty: 90 TABLET | Refills: 0 | Status: SHIPPED | OUTPATIENT
Start: 2021-12-13 | End: 2022-02-25 | Stop reason: SDUPTHER

## 2021-12-13 NOTE — PROGRESS NOTES
Hematology/Oncology Outpatient Follow Up    PATIENT NAME:Adelso Lynne  :1940  MRN: [id number]  PRIMARY CARE PHYSICIAN: Lyell, Reggie Duane, MD  REFERRING PHYSICIAN: Lyell, Reggie Duane, MD    Chief Complaint   Patient presents with   • Follow-up     IVC thrombosis, Long term current use of anticoagulants     HISTORY OF PRESENT ILLNESS:     This is an 81 y.o.female who has a history of pulmonary embolism at the age of 25 [more than 50 years ago].  Subsequently she developed deep venous thrombosis and patient was treated with warfarin therapy at the time.  She has a strong family  history of blood clots in her mother, brother, son and sister, but there is no known mutation yet identified in the family.  Patient was seen by Amalia Salamanca with [facility] and had an MRI of the abdomen to evaluate elevated liver function tests.  The liver showed evidence of fatty infiltration, but there was no mass identified.  The kidneys were normal.  The spleen did not show any abnormalities, as well as the pancreas.  There was a filling defect noted in the inferior vena cava below the renal veins, thought to represent some partial thrombosis.  The patient was then placed on Lovenox 40 mg subcutaneously daily and then referred for further evaluation and management of her thrombophilia.  Patient denies any new symptoms such as weight loss, night sweats, or fatigue.  She was able to carry out her own activities of daily living.  Her last colonoscopy was about three to four years ago and she is up to date on her routine mammograms.  Patient was accompanied by her daughter today for that appointment.      • Since her last visit on 16, patient was initiated on anticoagulation with warfarin.  Anticardiolipin IgG antibody was slightly high at 24 [normal < 23], beta-2 glycoprotein was negative.  Factor V Leiden was not mutated.  Prothrombin gene was not mutated as well.  PT 12.4, INR 1, PTT 27.  Protein C activity (N) 129.   Antithrombin III activity (N) 99.  Protein S activity (N) 85%.  · 8/27/2020  -duplex venous lower extremity bilateral - chronic left lower extremity deep vein thrombosis noted in the common femoral, deep femoral, proximal femoral, mid femoral and gastrocnemius.All other veins appeared normal bilaterally. Imaging reviewed by Anushka Patel MD and patient was continued on warfarin.  INR was therapeutic.   • 11/19/2020 Patient continued on warfarin.     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   • Pulmonary embolism (HCC)        Past Surgical History:   Procedure Laterality Date   • ANKLE SURGERY      due to fracture   • CYST REMOVAL      from the left wrist    • HYSTERECTOMY           Current Outpatient Medications:   •  allopurinol (ZYLOPRIM) 300 MG tablet, Take 1 tablet by mouth once daily, Disp: 90 tablet, Rfl: 0  •  atorvastatin (LIPITOR) 10 MG tablet, Take 1 tablet by mouth in the evening, Disp: 90 tablet, Rfl: 0  •  Cholecalciferol (VITAMIN D) 2000 units tablet, Take 2,000 Units by mouth Daily., Disp: , Rfl:   •  Multiple Vitamins-Minerals (MULTIVITAMIN WITH MINERALS) tablet tablet, Take 1 tablet by mouth Daily., Disp: , Rfl:   •  potassium chloride (K-DUR,KLOR-CON) 20 MEQ CR tablet, Take 1 tablet by mouth once daily, Disp: 90 tablet, Rfl: 0  •  triamterene-hydrochlorothiazide (MAXZIDE) 75-50 MG per tablet, Take 1 tablet by mouth once daily, Disp: 90 tablet, Rfl: 0  •  warfarin (COUMADIN) 1 MG tablet, Take 1 tablet by mouth Every Night. At 1700 or as directed., Disp: 30 tablet, Rfl: 5  •  warfarin (COUMADIN) 3 MG tablet, Take 1 tablet by mouth nightly at 5 pm, Disp: 90 tablet, Rfl: 0    Allergies   Allergen Reactions   • Codeine Shortness Of Breath, Rash and Unknown (See Comments)   • Hydroxychloroquine Rash     Light flashes spider web in right eye and black dot in left eye       Family  History   Problem Relation Age of Onset   • Breast cancer Daughter         onset in 40s   • Prostate cancer Son         onset in 60s   • Colon cancer Other        Cancer-related family history includes Breast cancer in her daughter; Colon cancer in an other family member; Prostate cancer in her son.    Social History     Tobacco Use   • Smoking status: Never Smoker   • Smokeless tobacco: Never Used   Substance Use Topics   • Alcohol use: Never   • Drug use: Never       HPI, ROS and PFSH have been reviewed and confirmed on 12/14/2021.     SUBJECTIVE:    Patient does not have any specific complaints today.  She remains on warfarin.  She does not have any bleeding issues.  There are no planned surgical procedures.  She is alone today for this appointment.        REVIEW OF SYSTEMS:     Review of Systems   Constitutional: Negative for appetite change, chills, fatigue, fever and unexpected weight change.   HENT: Negative for congestion, hearing loss, mouth sores, nosebleeds, postnasal drip, rhinorrhea and sore throat.    Eyes: Negative for photophobia, pain and visual disturbance.   Respiratory: Negative for cough, chest tightness, shortness of breath and wheezing.    Cardiovascular: Negative for chest pain, palpitations and leg swelling ( RLE - chronic ).   Gastrointestinal: Negative for abdominal distention, abdominal pain, constipation, diarrhea, nausea and vomiting.   Genitourinary: Negative for difficulty urinating, dysuria and hematuria.   Musculoskeletal: Positive for gait problem ( uses cane ). Negative for arthralgias, back pain and myalgias.   Skin: Negative for pallor and rash.   Neurological: Negative for dizziness, weakness, light-headedness, numbness and headaches.   Hematological: Negative for adenopathy. Does not bruise/bleed easily.   Psychiatric/Behavioral: Negative for agitation, confusion and dysphoric mood. The patient is not nervous/anxious.    All other systems reviewed and are  "negative.    OBJECTIVE:    Vitals:    12/14/21 1043   BP: 134/79   Pulse: 111   Resp: 20   Temp: 97.1 °F (36.2 °C)   TempSrc: Infrared   Weight: 91.2 kg (201 lb)   Height: 167.6 cm (66\")   PainSc: 0-No pain     ECOG  (1) Restricted in physically strenuous activity, ambulatory and able to do work of light nature    Physical Exam   Constitutional: She is oriented to person, place, and time. No distress.   HENT:   Head: Normocephalic and atraumatic.   Eyes: Conjunctivae are normal. Right eye exhibits no discharge. Left eye exhibits no discharge. No scleral icterus.   Neck: No thyromegaly present.   Cardiovascular: Normal rate, regular rhythm and normal heart sounds. Exam reveals no gallop and no friction rub.   Pulmonary/Chest: Effort normal. No stridor. No respiratory distress. She has no wheezes.   Abdominal: Soft. Bowel sounds are normal. She exhibits no mass. There is no abdominal tenderness. There is no rebound and no guarding.   Musculoskeletal: Normal range of motion. No tenderness.      Comments: Right lower extremity   Lymphadenopathy:     She has no cervical adenopathy.   Neurological: She is alert and oriented to person, place, and time. She exhibits normal muscle tone. Gait ( ambulates with cane ) abnormal.   Stooped posture    Skin: Skin is warm. No rash noted. She is not diaphoretic. No erythema.   Psychiatric: Her behavior is normal.   Nursing note and vitals reviewed.      I have reexamined the patient and the results are consistent with the previously documented exam. Anushka Patel MD     RECENT LABS    WBC   Date Value Ref Range Status   12/14/2021 8.41 3.40 - 10.80 10*3/mm3 Final   04/29/2021 7.6 3.4 - 10.8 x10E3/uL Final     RBC   Date Value Ref Range Status   12/14/2021 5.25 3.77 - 5.28 10*6/mm3 Final   04/29/2021 4.86 3.77 - 5.28 x10E6/uL Final     Hemoglobin   Date Value Ref Range Status   12/14/2021 16.3 (H) 12.0 - 15.9 g/dL Final     Hematocrit   Date Value Ref Range Status "   12/14/2021 46.8 (H) 34.0 - 46.6 % Final     MCV   Date Value Ref Range Status   12/14/2021 89.1 79.0 - 97.0 fL Final     MCH   Date Value Ref Range Status   12/14/2021 31.0 26.6 - 33.0 pg Final     MCHC   Date Value Ref Range Status   12/14/2021 34.8 31.5 - 35.7 g/dL Final     RDW   Date Value Ref Range Status   12/14/2021 15.6 (H) 12.3 - 15.4 % Final     RDW-SD   Date Value Ref Range Status   12/14/2021 50.3 37.0 - 54.0 fl Final     MPV   Date Value Ref Range Status   12/14/2021 10.2 6.0 - 12.0 fL Final     Platelets   Date Value Ref Range Status   12/14/2021 279 140 - 450 10*3/mm3 Final     Neutrophil %   Date Value Ref Range Status   12/14/2021 45.1 42.7 - 76.0 % Final     Lymphocyte %   Date Value Ref Range Status   12/14/2021 43.4 19.6 - 45.3 % Final     Monocyte %   Date Value Ref Range Status   12/14/2021 9.4 5.0 - 12.0 % Final     Eosinophil %   Date Value Ref Range Status   12/14/2021 1.9 0.3 - 6.2 % Final     Basophil %   Date Value Ref Range Status   12/14/2021 0.2 0.0 - 1.5 % Final     Neutrophils, Absolute   Date Value Ref Range Status   12/14/2021 3.79 1.70 - 7.00 10*3/mm3 Final     Lymphocytes, Absolute   Date Value Ref Range Status   12/14/2021 3.65 (H) 0.70 - 3.10 10*3/mm3 Final     Monocytes, Absolute   Date Value Ref Range Status   12/14/2021 0.79 0.10 - 0.90 10*3/mm3 Final     Eosinophils, Absolute   Date Value Ref Range Status   12/14/2021 0.16 0.00 - 0.40 10*3/mm3 Final     Basophils, Absolute   Date Value Ref Range Status   12/14/2021 0.02 0.00 - 0.20 10*3/mm3 Final     nRBC   Date Value Ref Range Status   06/22/2019 0.1 0.0 - 0.2 /100 WBC Final       Lab Results   Component Value Date    GLUCOSE 99 04/29/2021    BUN 10 04/29/2021    CREATININE 0.85 04/29/2021    EGFRIFNONA 64 04/29/2021    EGFRIFAFRI 74 04/29/2021    BCR 12 04/29/2021    K 4.1 04/29/2021    CO2 27 04/29/2021    CALCIUM 10.5 (H) 04/29/2021    PROTENTOTREF 6.8 04/29/2021    ALBUMIN 4.1 04/29/2021    LABIL2 1.5 04/29/2021     AST 31 04/29/2021    ALT 25 04/29/2021         ASSESSMENT:    1. Left lower extremity DVT- 8/27/2020 Chronic left lower extremity deep vein thrombosis noted in the common femoral, deep femoral, proximal femoral, mid femoral and gastrocnemius. All other veins appeared normal bilaterally.  Continue warfarin therapy  2. 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  Continue the same  3. History of pulmonary embolism and deep venous thrombosis in the past.  On anticoagulation therapy with warfarin, lifelong.  4. Strong family history of thrombophilia in multiple first degree relatives.   5. Anticardiolipin IgG antibody, slightly elevated at 24.   6. Continue to monitor her INRs    PLANS:    1. CBC reviewed  2. INR today  3. Flu vaccination  4. Patient to notify the office if she has surgery scheduled and will be bridged with Lovenox  5. Follow-up 6 months             I have reviewed labs results, imaging, vitals, and medications with the patient today. Will follow up in 6 months

## 2021-12-14 ENCOUNTER — LAB (OUTPATIENT)
Dept: LAB | Facility: HOSPITAL | Age: 81
End: 2021-12-14

## 2021-12-14 ENCOUNTER — OFFICE VISIT (OUTPATIENT)
Dept: ONCOLOGY | Facility: CLINIC | Age: 81
End: 2021-12-14

## 2021-12-14 VITALS
RESPIRATION RATE: 20 BRPM | TEMPERATURE: 97.1 F | BODY MASS INDEX: 32.3 KG/M2 | DIASTOLIC BLOOD PRESSURE: 79 MMHG | SYSTOLIC BLOOD PRESSURE: 134 MMHG | WEIGHT: 201 LBS | HEART RATE: 111 BPM | HEIGHT: 66 IN

## 2021-12-14 DIAGNOSIS — Z79.01 ENCOUNTER FOR MONITORING COUMADIN THERAPY: ICD-10-CM

## 2021-12-14 DIAGNOSIS — Z51.81 ENCOUNTER FOR MONITORING COUMADIN THERAPY: Primary | ICD-10-CM

## 2021-12-14 DIAGNOSIS — I82.220 IVC THROMBOSIS (HCC): ICD-10-CM

## 2021-12-14 DIAGNOSIS — Z51.81 ENCOUNTER FOR MONITORING COUMADIN THERAPY: ICD-10-CM

## 2021-12-14 DIAGNOSIS — Z79.01 ENCOUNTER FOR MONITORING COUMADIN THERAPY: Primary | ICD-10-CM

## 2021-12-14 LAB
BASOPHILS # BLD AUTO: 0.02 10*3/MM3 (ref 0–0.2)
BASOPHILS NFR BLD AUTO: 0.2 % (ref 0–1.5)
DEPRECATED RDW RBC AUTO: 50.3 FL (ref 37–54)
EOSINOPHIL # BLD AUTO: 0.16 10*3/MM3 (ref 0–0.4)
EOSINOPHIL NFR BLD AUTO: 1.9 % (ref 0.3–6.2)
ERYTHROCYTE [DISTWIDTH] IN BLOOD BY AUTOMATED COUNT: 15.6 % (ref 12.3–15.4)
HCT VFR BLD AUTO: 46.8 % (ref 34–46.6)
HGB BLD-MCNC: 16.3 G/DL (ref 12–15.9)
INR PPP: 2.2 (ref 0.8–1.2)
LYMPHOCYTES # BLD AUTO: 3.65 10*3/MM3 (ref 0.7–3.1)
LYMPHOCYTES NFR BLD AUTO: 43.4 % (ref 19.6–45.3)
MCH RBC QN AUTO: 31 PG (ref 26.6–33)
MCHC RBC AUTO-ENTMCNC: 34.8 G/DL (ref 31.5–35.7)
MCV RBC AUTO: 89.1 FL (ref 79–97)
MONOCYTES # BLD AUTO: 0.79 10*3/MM3 (ref 0.1–0.9)
MONOCYTES NFR BLD AUTO: 9.4 % (ref 5–12)
NEUTROPHILS NFR BLD AUTO: 3.79 10*3/MM3 (ref 1.7–7)
NEUTROPHILS NFR BLD AUTO: 45.1 % (ref 42.7–76)
PLATELET # BLD AUTO: 279 10*3/MM3 (ref 140–450)
PMV BLD AUTO: 10.2 FL (ref 6–12)
RBC # BLD AUTO: 5.25 10*6/MM3 (ref 3.77–5.28)
WBC NRBC COR # BLD: 8.41 10*3/MM3 (ref 3.4–10.8)

## 2021-12-14 PROCEDURE — 85610 PROTHROMBIN TIME: CPT

## 2021-12-14 PROCEDURE — 99214 OFFICE O/P EST MOD 30 MIN: CPT | Performed by: INTERNAL MEDICINE

## 2021-12-14 PROCEDURE — 85025 COMPLETE CBC W/AUTO DIFF WBC: CPT

## 2021-12-18 RX ORDER — TRIAMTERENE AND HYDROCHLOROTHIAZIDE 75; 50 MG/1; MG/1
TABLET ORAL
Qty: 90 TABLET | Refills: 0 | Status: SHIPPED | OUTPATIENT
Start: 2021-12-18 | End: 2022-03-20

## 2022-01-04 ENCOUNTER — LAB (OUTPATIENT)
Dept: LAB | Facility: HOSPITAL | Age: 82
End: 2022-01-04

## 2022-01-04 ENCOUNTER — HOSPITAL ENCOUNTER (OUTPATIENT)
Dept: ONCOLOGY | Facility: HOSPITAL | Age: 82
Setting detail: INFUSION SERIES
Discharge: HOME | End: 2022-01-04

## 2022-01-04 VITALS — HEIGHT: 66 IN | WEIGHT: 204.6 LBS | BODY MASS INDEX: 32.88 KG/M2

## 2022-01-04 DIAGNOSIS — Z79.01 ENCOUNTER FOR MONITORING COUMADIN THERAPY: Primary | ICD-10-CM

## 2022-01-04 DIAGNOSIS — Z79.01 ENCOUNTER FOR MONITORING COUMADIN THERAPY: ICD-10-CM

## 2022-01-04 DIAGNOSIS — Z23 INFLUENZA VACCINE NEEDED: Primary | ICD-10-CM

## 2022-01-04 DIAGNOSIS — Z51.81 ENCOUNTER FOR MONITORING COUMADIN THERAPY: ICD-10-CM

## 2022-01-04 DIAGNOSIS — I82.220 IVC THROMBOSIS: ICD-10-CM

## 2022-01-04 DIAGNOSIS — Z51.81 ENCOUNTER FOR MONITORING COUMADIN THERAPY: Primary | ICD-10-CM

## 2022-01-04 LAB — INR PPP: 1.8 (ref 0.8–1.2)

## 2022-01-04 PROCEDURE — 85610 PROTHROMBIN TIME: CPT

## 2022-01-04 PROCEDURE — 36416 COLLJ CAPILLARY BLOOD SPEC: CPT

## 2022-01-04 PROCEDURE — G0008 ADMIN INFLUENZA VIRUS VAC: HCPCS | Performed by: INTERNAL MEDICINE

## 2022-01-04 PROCEDURE — 96372 THER/PROPH/DIAG INJ SC/IM: CPT

## 2022-01-04 PROCEDURE — 25010000002 INFLUENZA VAC A&B SA ADJ QUAD 0.5 ML PREFILLED SYRINGE: Performed by: INTERNAL MEDICINE

## 2022-01-04 PROCEDURE — 90694 VACC AIIV4 NO PRSRV 0.5ML IM: CPT | Performed by: INTERNAL MEDICINE

## 2022-01-04 RX ADMIN — INFLUENZA VACCINE, ADJUVANTED 0.5 ML: 15; 15; 15; 15 INJECTION, SUSPENSION INTRAMUSCULAR at 10:00

## 2022-01-09 RX ORDER — POTASSIUM CHLORIDE 20 MEQ/1
TABLET, EXTENDED RELEASE ORAL
Qty: 90 TABLET | Refills: 0 | Status: SHIPPED | OUTPATIENT
Start: 2022-01-09 | End: 2022-04-25

## 2022-01-11 ENCOUNTER — LAB (OUTPATIENT)
Dept: LAB | Facility: HOSPITAL | Age: 82
End: 2022-01-11

## 2022-01-11 ENCOUNTER — HOSPITAL ENCOUNTER (OUTPATIENT)
Dept: ONCOLOGY | Facility: HOSPITAL | Age: 82
Setting detail: INFUSION SERIES
Discharge: HOME | End: 2022-01-11

## 2022-01-11 DIAGNOSIS — Z79.01 ENCOUNTER FOR MONITORING COUMADIN THERAPY: ICD-10-CM

## 2022-01-11 DIAGNOSIS — I82.220 IVC THROMBOSIS: ICD-10-CM

## 2022-01-11 DIAGNOSIS — I82.220 IVC THROMBOSIS: Primary | ICD-10-CM

## 2022-01-11 DIAGNOSIS — Z51.81 ENCOUNTER FOR MONITORING COUMADIN THERAPY: ICD-10-CM

## 2022-01-11 LAB — INR PPP: 2.2 (ref 0.8–1.2)

## 2022-01-11 PROCEDURE — 36416 COLLJ CAPILLARY BLOOD SPEC: CPT

## 2022-01-11 PROCEDURE — 85610 PROTHROMBIN TIME: CPT

## 2022-01-11 NOTE — PROGRESS NOTES
Pt's INR 2.20 today.   Pt. Instructed to continue warfarin 4mg daily and repeat INR in a week.   Pt. Verbalized understanding.   Pt. Discharged from clinic and appointment card given.

## 2022-01-18 ENCOUNTER — APPOINTMENT (OUTPATIENT)
Dept: LAB | Facility: HOSPITAL | Age: 82
End: 2022-01-18

## 2022-01-18 ENCOUNTER — TELEPHONE (OUTPATIENT)
Dept: ONCOLOGY | Facility: HOSPITAL | Age: 82
End: 2022-01-18

## 2022-01-18 ENCOUNTER — APPOINTMENT (OUTPATIENT)
Dept: ONCOLOGY | Facility: HOSPITAL | Age: 82
End: 2022-01-18

## 2022-01-18 NOTE — TELEPHONE ENCOUNTER
Called patient regarding missed INR viist today. She states she does not have anyone to bring her and wanted rescheduled. Patient confirmed new appt for 1/25 at 1020.

## 2022-01-24 ENCOUNTER — TELEPHONE (OUTPATIENT)
Dept: ONCOLOGY | Facility: HOSPITAL | Age: 82
End: 2022-01-24

## 2022-01-24 NOTE — TELEPHONE ENCOUNTER
Pt called stating that she missed her coumadin last night. I advised her to continue with today's dose and disregard missed dose. Pt verbalized understanding.

## 2022-01-25 ENCOUNTER — APPOINTMENT (OUTPATIENT)
Dept: ONCOLOGY | Facility: HOSPITAL | Age: 82
End: 2022-01-25

## 2022-01-25 ENCOUNTER — APPOINTMENT (OUTPATIENT)
Dept: LAB | Facility: HOSPITAL | Age: 82
End: 2022-01-25

## 2022-01-27 ENCOUNTER — HOSPITAL ENCOUNTER (OUTPATIENT)
Dept: ONCOLOGY | Facility: HOSPITAL | Age: 82
Setting detail: INFUSION SERIES
Discharge: HOME | End: 2022-01-27

## 2022-01-27 ENCOUNTER — LAB (OUTPATIENT)
Dept: LAB | Facility: HOSPITAL | Age: 82
End: 2022-01-27

## 2022-01-27 DIAGNOSIS — I82.220 IVC THROMBOSIS: ICD-10-CM

## 2022-01-27 DIAGNOSIS — Z79.01 LONG TERM (CURRENT) USE OF ANTICOAGULANTS: Primary | ICD-10-CM

## 2022-01-27 DIAGNOSIS — Z79.01 ENCOUNTER FOR MONITORING COUMADIN THERAPY: ICD-10-CM

## 2022-01-27 DIAGNOSIS — Z51.81 ENCOUNTER FOR MONITORING COUMADIN THERAPY: ICD-10-CM

## 2022-01-27 LAB — INR PPP: 1.7 (ref 0.8–1.2)

## 2022-01-27 PROCEDURE — 36416 COLLJ CAPILLARY BLOOD SPEC: CPT

## 2022-01-27 PROCEDURE — 85610 PROTHROMBIN TIME: CPT

## 2022-01-27 NOTE — PROGRESS NOTES
Patients INR is 1.7, denies changes in meds or diet. Increase to 5mg and recheck Monday. Appt given.

## 2022-01-31 ENCOUNTER — LAB (OUTPATIENT)
Dept: LAB | Facility: HOSPITAL | Age: 82
End: 2022-01-31

## 2022-01-31 ENCOUNTER — HOSPITAL ENCOUNTER (OUTPATIENT)
Dept: ONCOLOGY | Facility: HOSPITAL | Age: 82
Setting detail: INFUSION SERIES
Discharge: HOME | End: 2022-01-31

## 2022-01-31 DIAGNOSIS — Z79.01 LONG TERM (CURRENT) USE OF ANTICOAGULANTS: Primary | ICD-10-CM

## 2022-01-31 DIAGNOSIS — I82.220 IVC THROMBOSIS: ICD-10-CM

## 2022-01-31 DIAGNOSIS — Z79.01 ENCOUNTER FOR MONITORING COUMADIN THERAPY: ICD-10-CM

## 2022-01-31 DIAGNOSIS — Z51.81 ENCOUNTER FOR MONITORING COUMADIN THERAPY: ICD-10-CM

## 2022-01-31 LAB — INR PPP: 2.3 (ref 0.8–1.2)

## 2022-01-31 PROCEDURE — 85610 PROTHROMBIN TIME: CPT

## 2022-01-31 PROCEDURE — 36416 COLLJ CAPILLARY BLOOD SPEC: CPT

## 2022-02-01 RX ORDER — WARFARIN SODIUM 1 MG/1
TABLET ORAL
Qty: 30 TABLET | Refills: 0 | Status: SHIPPED | OUTPATIENT
Start: 2022-02-01 | End: 2022-04-06

## 2022-02-07 ENCOUNTER — LAB (OUTPATIENT)
Dept: LAB | Facility: HOSPITAL | Age: 82
End: 2022-02-07

## 2022-02-07 ENCOUNTER — HOSPITAL ENCOUNTER (OUTPATIENT)
Dept: ONCOLOGY | Facility: HOSPITAL | Age: 82
Setting detail: INFUSION SERIES
Discharge: HOME | End: 2022-02-07

## 2022-02-07 DIAGNOSIS — I82.220 IVC THROMBOSIS: ICD-10-CM

## 2022-02-07 DIAGNOSIS — Z79.01 ENCOUNTER FOR MONITORING COUMADIN THERAPY: ICD-10-CM

## 2022-02-07 DIAGNOSIS — Z51.81 ENCOUNTER FOR MONITORING COUMADIN THERAPY: ICD-10-CM

## 2022-02-07 DIAGNOSIS — Z79.01 LONG TERM (CURRENT) USE OF ANTICOAGULANTS: Primary | ICD-10-CM

## 2022-02-07 LAB — INR PPP: 3 (ref 0.8–1.2)

## 2022-02-07 PROCEDURE — 85610 PROTHROMBIN TIME: CPT

## 2022-02-07 PROCEDURE — G0463 HOSPITAL OUTPT CLINIC VISIT: HCPCS

## 2022-02-07 RX ORDER — WARFARIN SODIUM 2 MG/1
TABLET ORAL
Qty: 30 TABLET | Refills: 1 | Status: SHIPPED | OUTPATIENT
Start: 2022-02-07 | End: 2022-04-20

## 2022-02-07 NOTE — ADDENDUM NOTE
Encounter addended by: Thi Lea RN on: 2/7/2022 10:03 AM   Actions taken: Actions taken from a BestPractice Advisory, Allergies reviewed, Order list changed

## 2022-02-07 NOTE — PROGRESS NOTES
PT INR is 3 today she is on 5 mg of warfarin she will be back in one month for a recheck. PT requested warfarin 2 mg to be ordered will put in refill. Appt. Card given for 3-7-22 at 940 am.

## 2022-02-25 RX ORDER — ATORVASTATIN CALCIUM 10 MG/1
TABLET, FILM COATED ORAL
Qty: 90 TABLET | Refills: 0 | Status: SHIPPED | OUTPATIENT
Start: 2022-02-25 | End: 2022-04-25

## 2022-02-25 RX ORDER — ALLOPURINOL 300 MG/1
TABLET ORAL
Qty: 90 TABLET | Refills: 0 | Status: SHIPPED | OUTPATIENT
Start: 2022-02-25 | End: 2022-06-21

## 2022-03-07 ENCOUNTER — HOSPITAL ENCOUNTER (OUTPATIENT)
Dept: ONCOLOGY | Facility: HOSPITAL | Age: 82
Setting detail: INFUSION SERIES
Discharge: HOME | End: 2022-03-07

## 2022-03-07 ENCOUNTER — LAB (OUTPATIENT)
Dept: LAB | Facility: HOSPITAL | Age: 82
End: 2022-03-07

## 2022-03-07 DIAGNOSIS — Z51.81 ENCOUNTER FOR MONITORING COUMADIN THERAPY: ICD-10-CM

## 2022-03-07 DIAGNOSIS — Z79.01 LONG TERM (CURRENT) USE OF ANTICOAGULANTS: Primary | ICD-10-CM

## 2022-03-07 DIAGNOSIS — Z79.01 ENCOUNTER FOR MONITORING COUMADIN THERAPY: ICD-10-CM

## 2022-03-07 DIAGNOSIS — I82.220 IVC THROMBOSIS: ICD-10-CM

## 2022-03-07 LAB — INR PPP: 4.3 (ref 0.8–1.2)

## 2022-03-07 PROCEDURE — 85610 PROTHROMBIN TIME: CPT

## 2022-03-07 PROCEDURE — 36416 COLLJ CAPILLARY BLOOD SPEC: CPT

## 2022-03-07 NOTE — PROGRESS NOTES
Patient came in with INR of 4.3, she denies any changes in diet or meds. Hold coumadin tonight and recheck tomorrow. Appt confirmed with tg for 3/8 at 0930.

## 2022-03-08 ENCOUNTER — LAB (OUTPATIENT)
Dept: LAB | Facility: HOSPITAL | Age: 82
End: 2022-03-08

## 2022-03-08 ENCOUNTER — HOSPITAL ENCOUNTER (OUTPATIENT)
Dept: ONCOLOGY | Facility: HOSPITAL | Age: 82
Setting detail: INFUSION SERIES
Discharge: HOME | End: 2022-03-08

## 2022-03-08 DIAGNOSIS — Z79.01 ENCOUNTER FOR MONITORING COUMADIN THERAPY: ICD-10-CM

## 2022-03-08 DIAGNOSIS — Z79.01 LONG TERM (CURRENT) USE OF ANTICOAGULANTS: Primary | ICD-10-CM

## 2022-03-08 DIAGNOSIS — I82.220 IVC THROMBOSIS: ICD-10-CM

## 2022-03-08 DIAGNOSIS — Z51.81 ENCOUNTER FOR MONITORING COUMADIN THERAPY: ICD-10-CM

## 2022-03-08 LAB — INR PPP: 4.2 (ref 0.8–1.2)

## 2022-03-08 PROCEDURE — 85610 PROTHROMBIN TIME: CPT

## 2022-03-08 PROCEDURE — 36416 COLLJ CAPILLARY BLOOD SPEC: CPT

## 2022-03-08 NOTE — PROGRESS NOTES
INR 4.2. Advised pt to hold for 2 days and recheck on thursday as the INR had not changed much since her visit yesterday. She denies any bleeding at this time.

## 2022-03-10 ENCOUNTER — LAB (OUTPATIENT)
Dept: LAB | Facility: HOSPITAL | Age: 82
End: 2022-03-10

## 2022-03-10 ENCOUNTER — HOSPITAL ENCOUNTER (OUTPATIENT)
Dept: ONCOLOGY | Facility: HOSPITAL | Age: 82
Setting detail: INFUSION SERIES
Discharge: HOME | End: 2022-03-10

## 2022-03-10 DIAGNOSIS — Z51.81 ENCOUNTER FOR MONITORING COUMADIN THERAPY: ICD-10-CM

## 2022-03-10 DIAGNOSIS — I82.220 IVC THROMBOSIS: ICD-10-CM

## 2022-03-10 DIAGNOSIS — Z79.01 LONG TERM (CURRENT) USE OF ANTICOAGULANTS: Primary | ICD-10-CM

## 2022-03-10 DIAGNOSIS — Z79.01 ENCOUNTER FOR MONITORING COUMADIN THERAPY: ICD-10-CM

## 2022-03-10 LAB
INR PPP: 2.1 (ref 0.8–1.2)
INR PPP: 2.1 (ref 0.9–1.1)

## 2022-03-10 PROCEDURE — 85610 PROTHROMBIN TIME: CPT

## 2022-03-10 PROCEDURE — 36416 COLLJ CAPILLARY BLOOD SPEC: CPT

## 2022-03-10 NOTE — PROGRESS NOTES
Patient to start 4mg today and recheck on Monday.  Patient VU.    Patient also inquired about home INR machine- message sent to Yesy CARNEY RN

## 2022-03-14 ENCOUNTER — LAB (OUTPATIENT)
Dept: LAB | Facility: HOSPITAL | Age: 82
End: 2022-03-14

## 2022-03-14 ENCOUNTER — HOSPITAL ENCOUNTER (OUTPATIENT)
Dept: ONCOLOGY | Facility: HOSPITAL | Age: 82
Setting detail: INFUSION SERIES
Discharge: HOME | End: 2022-03-14

## 2022-03-14 DIAGNOSIS — Z79.01 LONG TERM (CURRENT) USE OF ANTICOAGULANTS: Primary | ICD-10-CM

## 2022-03-14 DIAGNOSIS — Z79.01 ENCOUNTER FOR MONITORING COUMADIN THERAPY: ICD-10-CM

## 2022-03-14 DIAGNOSIS — Z51.81 ENCOUNTER FOR MONITORING COUMADIN THERAPY: ICD-10-CM

## 2022-03-14 DIAGNOSIS — I82.220 IVC THROMBOSIS: ICD-10-CM

## 2022-03-14 LAB — INR PPP: 1.4 (ref 0.8–1.2)

## 2022-03-14 PROCEDURE — 85610 PROTHROMBIN TIME: CPT

## 2022-03-14 PROCEDURE — 36416 COLLJ CAPILLARY BLOOD SPEC: CPT

## 2022-03-14 NOTE — PROGRESS NOTES
Pt's INR is 1.4.  Pt instructed to increase to 5 mg daily and recheck Monday 3/21/22.  Pt v/u.  Pt also asking for an update on the INR machine that she requested.  Yamila w/ Yesy CARNEY and form will be faxed today.  Pt notified and v/u.

## 2022-03-17 ENCOUNTER — TELEPHONE (OUTPATIENT)
Dept: FAMILY MEDICINE CLINIC | Facility: CLINIC | Age: 82
End: 2022-03-17

## 2022-03-17 NOTE — TELEPHONE ENCOUNTER
Provider: DR CASEY  Caller: ORLY DOLL  Relationship to Patient: PATIENT  Phone Number: [phone]  Reason for Call: PATIENT REQUESTED A CALL BACK FROM DR CASEY.  SHE WOULD NOT LEAVE ANY OTHER DETAILS.

## 2022-03-17 NOTE — TELEPHONE ENCOUNTER
Left VM for patient to return our call needing details  cannot call the patient.     HUB can get details

## 2022-03-20 RX ORDER — TRIAMTERENE AND HYDROCHLOROTHIAZIDE 75; 50 MG/1; MG/1
TABLET ORAL
Qty: 90 TABLET | Refills: 0 | Status: SHIPPED | OUTPATIENT
Start: 2022-03-20 | End: 2022-06-21

## 2022-03-21 ENCOUNTER — LAB (OUTPATIENT)
Dept: LAB | Facility: HOSPITAL | Age: 82
End: 2022-03-21

## 2022-03-21 ENCOUNTER — HOSPITAL ENCOUNTER (OUTPATIENT)
Dept: ONCOLOGY | Facility: HOSPITAL | Age: 82
Setting detail: INFUSION SERIES
Discharge: HOME | End: 2022-03-21

## 2022-03-21 DIAGNOSIS — I82.220 IVC THROMBOSIS: ICD-10-CM

## 2022-03-21 DIAGNOSIS — Z51.81 ENCOUNTER FOR MONITORING COUMADIN THERAPY: ICD-10-CM

## 2022-03-21 DIAGNOSIS — Z79.01 ENCOUNTER FOR MONITORING COUMADIN THERAPY: ICD-10-CM

## 2022-03-21 LAB — INR PPP: 3 (ref 0.8–1.2)

## 2022-03-21 PROCEDURE — 85610 PROTHROMBIN TIME: CPT

## 2022-03-21 PROCEDURE — 36416 COLLJ CAPILLARY BLOOD SPEC: CPT

## 2022-03-22 ENCOUNTER — TELEPHONE (OUTPATIENT)
Dept: FAMILY MEDICINE CLINIC | Facility: CLINIC | Age: 82
End: 2022-03-22

## 2022-03-22 NOTE — TELEPHONE ENCOUNTER
Left VM for patient to return our call and give details on what she is needing since  will be out of office. This is second attempt

## 2022-03-22 NOTE — TELEPHONE ENCOUNTER
Caller: Adelso Lynne    Relationship: Self    Best call back number: [phone]     What is the best time to reach you: ANYTIME     Who are you requesting to speak with (clinical staff, provider,  specific staff member):     What was the call regarding: PATIENT DIDN'T WANT TO GIVE DETAILS WANTED TO SPEAK TO     Do you require a callback: YES

## 2022-03-28 ENCOUNTER — LAB (OUTPATIENT)
Dept: LAB | Facility: HOSPITAL | Age: 82
End: 2022-03-28

## 2022-03-28 ENCOUNTER — HOSPITAL ENCOUNTER (OUTPATIENT)
Dept: ONCOLOGY | Facility: HOSPITAL | Age: 82
Setting detail: INFUSION SERIES
Discharge: HOME | End: 2022-03-28

## 2022-03-28 DIAGNOSIS — Z51.81 ENCOUNTER FOR MONITORING COUMADIN THERAPY: ICD-10-CM

## 2022-03-28 DIAGNOSIS — Z79.01 LONG TERM (CURRENT) USE OF ANTICOAGULANTS: Primary | ICD-10-CM

## 2022-03-28 DIAGNOSIS — I82.220 IVC THROMBOSIS: ICD-10-CM

## 2022-03-28 DIAGNOSIS — Z79.01 ENCOUNTER FOR MONITORING COUMADIN THERAPY: ICD-10-CM

## 2022-03-28 LAB — INR PPP: 4.5 (ref 0.8–1.2)

## 2022-03-28 PROCEDURE — 36416 COLLJ CAPILLARY BLOOD SPEC: CPT

## 2022-03-28 PROCEDURE — 85610 PROTHROMBIN TIME: CPT

## 2022-03-28 NOTE — PROGRESS NOTES
Patient came in with INR of 4.5. She denies bleeding, change in diet or meds to affect this level. Patient to hold next two doses and Recheck Wednesday, appt confirmed with granddaughter. I asked patient to please check her pills and dosages at home to make sure she is not taking too many of the wrong dose tablet as she had this same problem a couple weeks ago. Patient to call with issues or bleeding.

## 2022-03-30 ENCOUNTER — LAB (OUTPATIENT)
Dept: LAB | Facility: HOSPITAL | Age: 82
End: 2022-03-30

## 2022-03-30 ENCOUNTER — HOSPITAL ENCOUNTER (OUTPATIENT)
Dept: ONCOLOGY | Facility: HOSPITAL | Age: 82
Setting detail: INFUSION SERIES
Discharge: HOME | End: 2022-03-30

## 2022-03-30 DIAGNOSIS — Z79.01 LONG TERM (CURRENT) USE OF ANTICOAGULANTS: Primary | ICD-10-CM

## 2022-03-30 DIAGNOSIS — Z51.81 ENCOUNTER FOR MONITORING COUMADIN THERAPY: ICD-10-CM

## 2022-03-30 DIAGNOSIS — Z79.01 ENCOUNTER FOR MONITORING COUMADIN THERAPY: ICD-10-CM

## 2022-03-30 DIAGNOSIS — I82.220 IVC THROMBOSIS: ICD-10-CM

## 2022-03-30 LAB — INR PPP: 3.1 (ref 0.8–1.2)

## 2022-03-30 PROCEDURE — 85610 PROTHROMBIN TIME: CPT

## 2022-03-30 PROCEDURE — 36416 COLLJ CAPILLARY BLOOD SPEC: CPT

## 2022-04-01 ENCOUNTER — LAB (OUTPATIENT)
Dept: LAB | Facility: HOSPITAL | Age: 82
End: 2022-04-01

## 2022-04-01 ENCOUNTER — HOSPITAL ENCOUNTER (OUTPATIENT)
Dept: ONCOLOGY | Facility: HOSPITAL | Age: 82
Setting detail: INFUSION SERIES
Discharge: HOME | End: 2022-04-01

## 2022-04-01 DIAGNOSIS — I82.220 IVC THROMBOSIS: ICD-10-CM

## 2022-04-01 DIAGNOSIS — Z51.81 ENCOUNTER FOR MONITORING COUMADIN THERAPY: ICD-10-CM

## 2022-04-01 DIAGNOSIS — Z79.01 LONG TERM (CURRENT) USE OF ANTICOAGULANTS: Primary | ICD-10-CM

## 2022-04-01 DIAGNOSIS — Z79.01 ENCOUNTER FOR MONITORING COUMADIN THERAPY: ICD-10-CM

## 2022-04-01 LAB — INR PPP: 2.2 (ref 0.8–1.2)

## 2022-04-01 PROCEDURE — 36416 COLLJ CAPILLARY BLOOD SPEC: CPT

## 2022-04-01 PROCEDURE — 85610 PROTHROMBIN TIME: CPT

## 2022-04-01 NOTE — PROGRESS NOTES
PT here for INR today's level is 2.2 on 4 mg of Warfarin. She is to come back next Friday 4-8-22 at 10:00 am for repeat INR and if good can go monthly.

## 2022-04-06 RX ORDER — WARFARIN SODIUM 1 MG/1
TABLET ORAL
Qty: 30 TABLET | Refills: 0 | Status: SHIPPED | OUTPATIENT
Start: 2022-04-06 | End: 2022-05-03 | Stop reason: SDUPTHER

## 2022-04-08 ENCOUNTER — HOSPITAL ENCOUNTER (OUTPATIENT)
Dept: ONCOLOGY | Facility: HOSPITAL | Age: 82
Setting detail: INFUSION SERIES
Discharge: HOME | End: 2022-04-08

## 2022-04-08 ENCOUNTER — LAB (OUTPATIENT)
Dept: LAB | Facility: HOSPITAL | Age: 82
End: 2022-04-08

## 2022-04-08 DIAGNOSIS — Z51.81 ENCOUNTER FOR MONITORING COUMADIN THERAPY: ICD-10-CM

## 2022-04-08 DIAGNOSIS — I82.220 IVC THROMBOSIS: ICD-10-CM

## 2022-04-08 DIAGNOSIS — Z79.01 LONG TERM (CURRENT) USE OF ANTICOAGULANTS: Primary | ICD-10-CM

## 2022-04-08 DIAGNOSIS — Z79.01 ENCOUNTER FOR MONITORING COUMADIN THERAPY: ICD-10-CM

## 2022-04-08 LAB — INR PPP: 1.9 (ref 0.8–1.2)

## 2022-04-08 PROCEDURE — 85610 PROTHROMBIN TIME: CPT

## 2022-04-08 PROCEDURE — 36416 COLLJ CAPILLARY BLOOD SPEC: CPT

## 2022-04-08 NOTE — PROGRESS NOTES
Patient came in with INR of 1.9. She denies missed doses. Patient would like to try alternating 4 and 5mg daily instead of 4.5 daily. Recheck in one week, apt confirmed with granddaughter.

## 2022-04-15 ENCOUNTER — HOSPITAL ENCOUNTER (OUTPATIENT)
Dept: ONCOLOGY | Facility: HOSPITAL | Age: 82
Setting detail: INFUSION SERIES
Discharge: HOME | End: 2022-04-15

## 2022-04-15 ENCOUNTER — LAB (OUTPATIENT)
Dept: LAB | Facility: HOSPITAL | Age: 82
End: 2022-04-15

## 2022-04-15 DIAGNOSIS — I82.220 IVC THROMBOSIS: ICD-10-CM

## 2022-04-15 DIAGNOSIS — Z79.01 LONG TERM (CURRENT) USE OF ANTICOAGULANTS: Primary | ICD-10-CM

## 2022-04-15 DIAGNOSIS — Z79.01 ENCOUNTER FOR MONITORING COUMADIN THERAPY: ICD-10-CM

## 2022-04-15 DIAGNOSIS — Z51.81 ENCOUNTER FOR MONITORING COUMADIN THERAPY: ICD-10-CM

## 2022-04-15 LAB — INR PPP: 2.4 (ref 0.8–1.2)

## 2022-04-15 PROCEDURE — 36416 COLLJ CAPILLARY BLOOD SPEC: CPT

## 2022-04-15 PROCEDURE — 85610 PROTHROMBIN TIME: CPT

## 2022-04-15 NOTE — PROGRESS NOTES
Pt advised to continue same dosing and recheck INR in 1 week. Pt and family member verbalized understanding.

## 2022-04-20 RX ORDER — WARFARIN SODIUM 2 MG/1
TABLET ORAL
Qty: 30 TABLET | Refills: 0 | Status: SHIPPED | OUTPATIENT
Start: 2022-04-20 | End: 2022-04-29

## 2022-04-22 ENCOUNTER — LAB (OUTPATIENT)
Dept: LAB | Facility: HOSPITAL | Age: 82
End: 2022-04-22

## 2022-04-22 ENCOUNTER — HOSPITAL ENCOUNTER (OUTPATIENT)
Dept: ONCOLOGY | Facility: HOSPITAL | Age: 82
Setting detail: INFUSION SERIES
Discharge: HOME | End: 2022-04-22

## 2022-04-22 DIAGNOSIS — Z51.81 ENCOUNTER FOR MONITORING COUMADIN THERAPY: ICD-10-CM

## 2022-04-22 DIAGNOSIS — Z79.01 ENCOUNTER FOR MONITORING COUMADIN THERAPY: ICD-10-CM

## 2022-04-22 DIAGNOSIS — I82.220 IVC THROMBOSIS: ICD-10-CM

## 2022-04-22 LAB — INR PPP: 2.9 (ref 0.8–1.2)

## 2022-04-22 PROCEDURE — 85610 PROTHROMBIN TIME: CPT

## 2022-04-22 PROCEDURE — 36416 COLLJ CAPILLARY BLOOD SPEC: CPT

## 2022-04-22 RX ORDER — WARFARIN SODIUM 3 MG/1
TABLET ORAL
Qty: 90 TABLET | Refills: 0 | Status: SHIPPED | OUTPATIENT
Start: 2022-04-22

## 2022-04-25 RX ORDER — POTASSIUM CHLORIDE 20 MEQ/1
TABLET, EXTENDED RELEASE ORAL
Qty: 90 TABLET | Refills: 0 | Status: SHIPPED | OUTPATIENT
Start: 2022-04-25

## 2022-04-25 RX ORDER — ATORVASTATIN CALCIUM 10 MG/1
TABLET, FILM COATED ORAL
Qty: 90 TABLET | Refills: 0 | Status: SHIPPED | OUTPATIENT
Start: 2022-04-25

## 2022-04-29 ENCOUNTER — TELEPHONE (OUTPATIENT)
Dept: ONCOLOGY | Facility: CLINIC | Age: 82
End: 2022-04-29

## 2022-04-29 RX ORDER — WARFARIN SODIUM 2 MG/1
TABLET ORAL
Qty: 30 TABLET | Refills: 2 | Status: SHIPPED | OUTPATIENT
Start: 2022-04-29 | End: 2022-04-29 | Stop reason: SDUPTHER

## 2022-04-29 RX ORDER — WARFARIN SODIUM 2 MG/1
TABLET ORAL
Qty: 30 TABLET | Refills: 3 | Status: SHIPPED | OUTPATIENT
Start: 2022-04-29 | End: 2022-06-14 | Stop reason: SDUPTHER

## 2022-04-29 NOTE — TELEPHONE ENCOUNTER
Caller: Adelso Lynne    Relationship: Self    Best call back number: [phone] (H)    Who are you requesting to speak with (clinical staff, provider,  specific staff member): CLINICAL    What was the call regarding: PATIENT HAS HAD DIFFICULTY HAVING RX FOR WARFARIN 1, 2, AND 3 MG.    PATIENT STATES THAT THE PHARMACY NEEDS TO SPEAK WITH OFFICE.    PATIENT REQUESTING QUANTITY BE UPPED TO 60-90 DAY SUPPLY ALSO.    Pharmacy    [address] [place] [address] - [phone]  - [phone] [place] [address]   Phone:  [phone]  Fax:  [phone]             Do you require a callback:YES

## 2022-05-03 RX ORDER — WARFARIN SODIUM 1 MG/1
TABLET ORAL
Qty: 90 TABLET | Refills: 0 | Status: SHIPPED | OUTPATIENT
Start: 2022-05-03

## 2022-05-20 ENCOUNTER — LAB (OUTPATIENT)
Dept: LAB | Facility: HOSPITAL | Age: 82
End: 2022-05-20

## 2022-05-20 ENCOUNTER — HOSPITAL ENCOUNTER (OUTPATIENT)
Dept: ONCOLOGY | Facility: HOSPITAL | Age: 82
Setting detail: INFUSION SERIES
Discharge: HOME | End: 2022-05-20

## 2022-05-20 DIAGNOSIS — I82.220 IVC THROMBOSIS: ICD-10-CM

## 2022-05-20 DIAGNOSIS — Z79.01 ENCOUNTER FOR MONITORING COUMADIN THERAPY: ICD-10-CM

## 2022-05-20 DIAGNOSIS — Z79.01 LONG TERM (CURRENT) USE OF ANTICOAGULANTS: Primary | ICD-10-CM

## 2022-05-20 DIAGNOSIS — Z51.81 ENCOUNTER FOR MONITORING COUMADIN THERAPY: ICD-10-CM

## 2022-05-20 LAB — INR PPP: 4.5 (ref 0.8–1.2)

## 2022-05-20 PROCEDURE — 85610 PROTHROMBIN TIME: CPT

## 2022-05-20 PROCEDURE — 36416 COLLJ CAPILLARY BLOOD SPEC: CPT

## 2022-05-20 NOTE — PROGRESS NOTES
INR 4.5, She denies any bleeding issues or change in medication/diet. Advised pt to hold and recheck on Monday. She may have her home INR machine working by then. IF so she can check it at home and call with the result.  If not Appt time given.

## 2022-05-23 ENCOUNTER — LAB (OUTPATIENT)
Dept: LAB | Facility: HOSPITAL | Age: 82
End: 2022-05-23

## 2022-05-23 ENCOUNTER — HOSPITAL ENCOUNTER (OUTPATIENT)
Dept: ONCOLOGY | Facility: HOSPITAL | Age: 82
Setting detail: INFUSION SERIES
Discharge: HOME | End: 2022-05-23

## 2022-05-23 DIAGNOSIS — Z51.81 ENCOUNTER FOR MONITORING COUMADIN THERAPY: ICD-10-CM

## 2022-05-23 DIAGNOSIS — Z79.01 LONG TERM (CURRENT) USE OF ANTICOAGULANTS: Primary | ICD-10-CM

## 2022-05-23 DIAGNOSIS — Z79.01 ENCOUNTER FOR MONITORING COUMADIN THERAPY: ICD-10-CM

## 2022-05-23 DIAGNOSIS — I82.220 IVC THROMBOSIS: ICD-10-CM

## 2022-05-23 LAB — INR PPP: 2 (ref 0.8–1.2)

## 2022-05-23 PROCEDURE — 36416 COLLJ CAPILLARY BLOOD SPEC: CPT

## 2022-05-23 PROCEDURE — 85610 PROTHROMBIN TIME: CPT

## 2022-05-23 NOTE — PROGRESS NOTES
I advised pt to restart her coumadin at 4mg daily and recheck INR on Monday with home khadar, pt verbalized understanding to call office with results.

## 2022-05-30 LAB — INR PPP: 2.8

## 2022-05-31 ENCOUNTER — TELEPHONE (OUTPATIENT)
Dept: ONCOLOGY | Facility: CLINIC | Age: 82
End: 2022-05-31

## 2022-05-31 NOTE — TELEPHONE ENCOUNTER
Provider: DR CLARKE  Caller: ORLY      Reason for Call: CALLING WITH INR DONE YESTERDAY    INR: 2.8      PLEASE ADVISE

## 2022-05-31 NOTE — TELEPHONE ENCOUNTER
Returned call to patient.  No answer.  Message left on identifying voice mail to continue current dosing and repeat INR in one week.  Advised if she had any questions or concerns to contact the office.

## 2022-06-13 ENCOUNTER — TELEPHONE (OUTPATIENT)
Dept: ONCOLOGY | Facility: HOSPITAL | Age: 82
End: 2022-06-13

## 2022-06-14 ENCOUNTER — HOSPITAL ENCOUNTER (OUTPATIENT)
Dept: ONCOLOGY | Facility: HOSPITAL | Age: 82
Setting detail: INFUSION SERIES
Discharge: HOME | End: 2022-06-14

## 2022-06-14 ENCOUNTER — LAB (OUTPATIENT)
Dept: LAB | Facility: HOSPITAL | Age: 82
End: 2022-06-14

## 2022-06-14 DIAGNOSIS — I82.220 IVC THROMBOSIS: ICD-10-CM

## 2022-06-14 DIAGNOSIS — Z79.01 LONG TERM (CURRENT) USE OF ANTICOAGULANTS: Primary | ICD-10-CM

## 2022-06-14 DIAGNOSIS — Z51.81 ENCOUNTER FOR MONITORING COUMADIN THERAPY: ICD-10-CM

## 2022-06-14 DIAGNOSIS — Z79.01 ENCOUNTER FOR MONITORING COUMADIN THERAPY: ICD-10-CM

## 2022-06-14 LAB — INR PPP: 2.6 (ref 0.8–1.2)

## 2022-06-14 PROCEDURE — 85610 PROTHROMBIN TIME: CPT

## 2022-06-14 PROCEDURE — 36416 COLLJ CAPILLARY BLOOD SPEC: CPT

## 2022-06-14 RX ORDER — WARFARIN SODIUM 2 MG/1
TABLET ORAL
Qty: 30 TABLET | Refills: 3 | Status: SHIPPED | OUTPATIENT
Start: 2022-06-14

## 2022-06-14 NOTE — PROGRESS NOTES
INR 2.6 today. Pt to continue to take warfarin 4 mg daily. She will check INR with home machine in 2 weeks and call us with results.

## 2022-06-21 RX ORDER — TRIAMTERENE AND HYDROCHLOROTHIAZIDE 75; 50 MG/1; MG/1
TABLET ORAL
Qty: 90 TABLET | Refills: 0 | Status: SHIPPED | OUTPATIENT
Start: 2022-06-21

## 2022-06-21 RX ORDER — ALLOPURINOL 300 MG/1
TABLET ORAL
Qty: 90 TABLET | Refills: 0 | Status: SHIPPED | OUTPATIENT
Start: 2022-06-21

## 2022-06-22 ENCOUNTER — TELEPHONE (OUTPATIENT)
Dept: ONCOLOGY | Facility: HOSPITAL | Age: 82
End: 2022-06-22

## 2022-06-22 LAB — INR PPP: 2.9

## 2022-06-22 NOTE — PROGRESS NOTES
Called patient in regards to INR from yesterday of 2.9. Left message asking patient to continue current dose of 4mg and recheck in one week with home machine.

## 2022-06-28 LAB — INR PPP: 4.3

## 2022-06-30 ENCOUNTER — LAB (OUTPATIENT)
Dept: LAB | Facility: HOSPITAL | Age: 82
End: 2022-06-30

## 2022-06-30 ENCOUNTER — TELEPHONE (OUTPATIENT)
Dept: ONCOLOGY | Facility: HOSPITAL | Age: 82
End: 2022-06-30

## 2022-06-30 ENCOUNTER — HOSPITAL ENCOUNTER (OUTPATIENT)
Dept: ONCOLOGY | Facility: HOSPITAL | Age: 82
Setting detail: INFUSION SERIES
Discharge: HOME | End: 2022-06-30

## 2022-06-30 DIAGNOSIS — Z79.01 LONG TERM (CURRENT) USE OF ANTICOAGULANTS: ICD-10-CM

## 2022-06-30 DIAGNOSIS — Z79.01 LONG TERM (CURRENT) USE OF ANTICOAGULANTS: Primary | ICD-10-CM

## 2022-06-30 LAB — INR PPP: 3.7 (ref 0.8–1.2)

## 2022-06-30 PROCEDURE — 85610 PROTHROMBIN TIME: CPT

## 2022-06-30 PROCEDURE — 36416 COLLJ CAPILLARY BLOOD SPEC: CPT

## 2022-06-30 NOTE — PROGRESS NOTES
Patient checked INR on 6/28/22 it was 4.3, patient came in today to have it re-checked it is 3.7, advised patient to hold warfarin today and tomorrow and recheck on sat. Patient v/u she is on 4mg

## 2022-06-30 NOTE — TELEPHONE ENCOUNTER
Spoke with pt and left message for her daughter Phuong to call the office.  The INR result from 6/28/22 was faxed to us yesterday. The INR was high at 4.3.  I explained that the home testing needs to be done between the hours of 8 to 4:30 and the result called to the office the same day. This allows for th dose to be properly adjusted.  I explained that with the high level she is at risk for bleeding.  Pt v/u.

## 2022-07-02 LAB — INR PPP: 2.9

## 2022-07-05 ENCOUNTER — TELEPHONE (OUTPATIENT)
Dept: ONCOLOGY | Facility: HOSPITAL | Age: 82
End: 2022-07-05

## 2022-07-05 NOTE — PROGRESS NOTES
Pt called stating that her INR was 2.9, I advised her to continue same dose and recheck INR in 1 week, pt verbalized understanding.

## 2022-07-11 ENCOUNTER — TELEPHONE (OUTPATIENT)
Dept: ONCOLOGY | Facility: HOSPITAL | Age: 82
End: 2022-07-11

## 2022-07-11 LAB — INR PPP: 2.7

## 2022-07-11 NOTE — PROGRESS NOTES
Called patient in regards to her INR from Saturday. Patient to continue 4mg daily and recheck in one month unless otherwise required for her home machine.

## 2022-07-16 ENCOUNTER — ANTICOAGULATION VISIT (OUTPATIENT)
Dept: ONCOLOGY | Facility: HOSPITAL | Age: 82
End: 2022-07-16

## 2022-07-16 LAB — INR PPP: 3.4

## 2022-07-18 LAB — INR PPP: 1.9

## 2022-07-18 RX ORDER — POTASSIUM CHLORIDE 20 MEQ/1
TABLET, EXTENDED RELEASE ORAL
Qty: 90 TABLET | Refills: 0 | OUTPATIENT
Start: 2022-07-18

## 2022-07-19 ENCOUNTER — ANTICOAGULATION VISIT (OUTPATIENT)
Dept: ONCOLOGY | Facility: HOSPITAL | Age: 82
End: 2022-07-19

## 2022-07-19 NOTE — PROGRESS NOTES
Hematology/Oncology Outpatient Follow Up    PATIENT NAME:Adelso Lynne  :1940  MRN: [id number]  PRIMARY CARE PHYSICIAN: Provider, No Known  REFERRING PHYSICIAN: Lyell, Reggie Duane, MD    Chief Complaint   Patient presents with   • Follow-up     IVC thrombosis (CMS/HCC)       HISTORY OF PRESENT ILLNESS:     This is an 82 y.o.female who has a history of pulmonary embolism at the age of 25 [more than 50 years ago].  Subsequently she developed deep venous thrombosis and patient was treated with warfarin therapy at the time.  She has a strong family  history of blood clots in her mother, brother, son and sister, but there is no known mutation yet identified in the family.  Patient was seen by Amalia Salamanca with [facility] and had an MRI of the abdomen to evaluate elevated liver function tests.  The liver showed evidence of fatty infiltration, but there was no mass identified.  The kidneys were normal.  The spleen did not show any abnormalities, as well as the pancreas.  There was a filling defect noted in the inferior vena cava below the renal veins, thought to represent some partial thrombosis.  The patient was then placed on Lovenox 40 mg subcutaneously daily and then referred for further evaluation and management of her thrombophilia.  Patient denies any new symptoms such as weight loss, night sweats, or fatigue.  She was able to carry out her own activities of daily living.  Her last colonoscopy was about three to four years ago and she is up to date on her routine mammograms.  Patient was accompanied by her daughter today for that appointment.        • Since her last visit on 16, patient was initiated on anticoagulation with warfarin.  Anticardiolipin IgG antibody was slightly high at 24 [normal < 23], beta-2 glycoprotein was negative.  Factor V Leiden was not mutated.  Prothrombin gene was not mutated as well.  PT 12.4, INR 1, PTT 27.  Protein C activity (N) 129.  Antithrombin III activity (N) 99.  Protein  S activity (N) 85%.  · 8/27/2020  -duplex venous lower extremity bilateral - chronic left lower extremity deep vein thrombosis noted in the common femoral, deep femoral, proximal femoral, mid femoral and gastrocnemius.All other veins appeared normal bilaterally. Imaging reviewed by Anushka Patel MD and patient was continued on warfarin.  INR was therapeutic.   • 11/19/2020 Patient continued on warfarin.     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   • Pulmonary embolism (HCC)        Past Surgical History:   Procedure Laterality Date   • ANKLE SURGERY      due to fracture   • CYST REMOVAL      from the left wrist    • HYSTERECTOMY           Current Outpatient Medications:   •  allopurinol (ZYLOPRIM) 300 MG tablet, Take 1 tablet by mouth once daily, Disp: 90 tablet, Rfl: 0  •  atorvastatin (LIPITOR) 10 MG tablet, Take 1 tablet by mouth in the evening, Disp: 90 tablet, Rfl: 0  •  Cholecalciferol (VITAMIN D) 2000 units tablet, Take 2,000 Units by mouth Daily., Disp: , Rfl:   •  Multiple Vitamins-Minerals (MULTIVITAMIN WITH MINERALS) tablet tablet, Take 1 tablet by mouth Daily., Disp: , Rfl:   •  potassium chloride (K-DUR,KLOR-CON) 20 MEQ CR tablet, TAKE 1  BY MOUTH ONCE DAILY, Disp: 90 tablet, Rfl: 0  •  triamterene-hydrochlorothiazide (MAXZIDE) 75-50 MG per tablet, Take 1 tablet by mouth once daily, Disp: 90 tablet, Rfl: 0  •  warfarin (COUMADIN) 1 MG tablet, Take one tablet by mouth daily or as directed based on your INR., Disp: 90 tablet, Rfl: 0  •  warfarin (COUMADIN) 2 MG tablet, TAKE TWO TABLETS BY MOUTH DAILY., Disp: 30 tablet, Rfl: 3  •  warfarin (COUMADIN) 3 MG tablet, Take 1 tablet by mouth nightly at 5 pm or as directed, Disp: 90 tablet, Rfl: 0    Allergies   Allergen Reactions   • Codeine Shortness Of Breath, Rash and Unknown (See Comments)   • Hydroxychloroquine Rash     Light flashes  spider web in right eye and black dot in left eye       Family History   Problem Relation Age of Onset   • Breast cancer Daughter         onset in 40s   • Prostate cancer Son         onset in 60s   • Colon cancer Other        Cancer-related family history includes Breast cancer in her daughter; Colon cancer in an other family member; Prostate cancer in her son.    Social History     Tobacco Use   • Smoking status: Never Smoker   • Smokeless tobacco: Never Used   Substance Use Topics   • Alcohol use: Never   • Drug use: Never       HPI, ROS and PFSH have been reviewed and confirmed on 7/22/2022.     SUBJECTIVE:    Patient is here today for routine follow-up and does not have issues.  She denies any bleeding problems or any surgical procedures that are being planned..        REVIEW OF SYSTEMS:     Review of Systems   Constitutional: Negative for appetite change, chills, fatigue, fever and unexpected weight change.   HENT: Negative for congestion, hearing loss, mouth sores, nosebleeds, postnasal drip, rhinorrhea and sore throat.    Eyes: Negative for photophobia, pain and visual disturbance.   Respiratory: Negative for cough, chest tightness, shortness of breath and wheezing.    Cardiovascular: Negative for chest pain, palpitations and leg swelling ( RLE - chronic ).   Gastrointestinal: Negative for abdominal distention, abdominal pain, constipation, diarrhea, nausea and vomiting.   Genitourinary: Negative for difficulty urinating, dysuria and hematuria.   Musculoskeletal: Positive for gait problem ( uses cane ). Negative for arthralgias, back pain and myalgias.   Skin: Negative for pallor and rash.   Neurological: Negative for dizziness, weakness, light-headedness, numbness and headaches.   Hematological: Negative for adenopathy. Does not bruise/bleed easily.   Psychiatric/Behavioral: Negative for agitation, confusion and dysphoric mood. The patient is not nervous/anxious.    All other systems reviewed and are  "negative.    OBJECTIVE:    Vitals:    07/22/22 1059   BP: 153/99   Pulse: 86   Resp: 22   Temp: 96.9 °F (36.1 °C)   TempSrc: Infrared   Height: 167.6 cm (66\")   PainSc: 10-Worst pain ever   PainLoc: Knee     ECOG  (1) Restricted in physically strenuous activity, ambulatory and able to do work of light nature    Physical Exam   Constitutional: She is oriented to person, place, and time. No distress.   HENT:   Head: Normocephalic and atraumatic.   Eyes: Conjunctivae are normal. Right eye exhibits no discharge. Left eye exhibits no discharge. No scleral icterus.   Neck: No thyromegaly present.   Cardiovascular: Normal rate, regular rhythm and normal heart sounds. Exam reveals no gallop and no friction rub.   Pulmonary/Chest: Effort normal. No stridor. No respiratory distress. She has no wheezes.   Abdominal: Soft. Bowel sounds are normal. She exhibits no mass. There is no abdominal tenderness. There is no rebound and no guarding.   Musculoskeletal: Normal range of motion. No tenderness.      Comments: Right lower extremity   Lymphadenopathy:     She has no cervical adenopathy.   Neurological: She is alert and oriented to person, place, and time. She exhibits normal muscle tone. Gait ( ambulates with cane ) abnormal.   Stooped posture    Skin: Skin is warm. No rash noted. She is not diaphoretic. No erythema.   Psychiatric: Her behavior is normal.   Nursing note and vitals reviewed.      I have reexamined the patient and the results are consistent with the previously documented exam. Anushka Patel MD     RECENT LABS    WBC   Date Value Ref Range Status   07/22/2022 10.14 3.40 - 10.80 10*3/mm3 Final   04/29/2021 7.6 3.4 - 10.8 x10E3/uL Final     RBC   Date Value Ref Range Status   07/22/2022 5.36 (H) 3.77 - 5.28 10*6/mm3 Final   04/29/2021 4.86 3.77 - 5.28 x10E6/uL Final     Hemoglobin   Date Value Ref Range Status   07/22/2022 16.6 (H) 12.0 - 15.9 g/dL Final     Hematocrit   Date Value Ref Range Status "   07/22/2022 48.8 (H) 34.0 - 46.6 % Final     MCV   Date Value Ref Range Status   07/22/2022 91.0 79.0 - 97.0 fL Final     MCH   Date Value Ref Range Status   07/22/2022 31.0 26.6 - 33.0 pg Final     MCHC   Date Value Ref Range Status   07/22/2022 34.0 31.5 - 35.7 g/dL Final     RDW   Date Value Ref Range Status   07/22/2022 15.2 12.3 - 15.4 % Final     RDW-SD   Date Value Ref Range Status   07/22/2022 49.7 37.0 - 54.0 fl Final     MPV   Date Value Ref Range Status   07/22/2022 10.2 6.0 - 12.0 fL Final     Platelets   Date Value Ref Range Status   07/22/2022 274 140 - 450 10*3/mm3 Final     Neutrophil %   Date Value Ref Range Status   07/22/2022 40.6 (L) 42.7 - 76.0 % Final     Lymphocyte %   Date Value Ref Range Status   07/22/2022 46.1 (H) 19.6 - 45.3 % Final     Monocyte %   Date Value Ref Range Status   07/22/2022 11.0 5.0 - 12.0 % Final     Eosinophil %   Date Value Ref Range Status   07/22/2022 2.0 0.3 - 6.2 % Final     Basophil %   Date Value Ref Range Status   07/22/2022 0.3 0.0 - 1.5 % Final     Neutrophils, Absolute   Date Value Ref Range Status   07/22/2022 4.12 1.70 - 7.00 10*3/mm3 Final     Lymphocytes, Absolute   Date Value Ref Range Status   07/22/2022 4.67 (H) 0.70 - 3.10 10*3/mm3 Final     Monocytes, Absolute   Date Value Ref Range Status   07/22/2022 1.12 (H) 0.10 - 0.90 10*3/mm3 Final     Eosinophils, Absolute   Date Value Ref Range Status   07/22/2022 0.20 0.00 - 0.40 10*3/mm3 Final     Basophils, Absolute   Date Value Ref Range Status   07/22/2022 0.03 0.00 - 0.20 10*3/mm3 Final     nRBC   Date Value Ref Range Status   06/22/2019 0.1 0.0 - 0.2 /100 WBC Final       Lab Results   Component Value Date    GLUCOSE 99 04/29/2021    BUN 10 04/29/2021    CREATININE 0.85 04/29/2021    EGFRIFNONA 64 04/29/2021    EGFRIFAFRI 74 04/29/2021    BCR 12 04/29/2021    K 4.1 04/29/2021    CO2 27 04/29/2021    CALCIUM 10.5 (H) 04/29/2021    PROTENTOTREF 6.8 04/29/2021    ALBUMIN 4.1 04/29/2021    LABIL2 1.5  04/29/2021    AST 31 04/29/2021    ALT 25 04/29/2021         ASSESSMENT:    1. Left lower extremity DVT- 8/27/2020 Chronic left lower extremity deep vein thrombosis noted in the common femoral, deep femoral, proximal femoral, mid femoral and gastrocnemius. All other veins appeared normal bilaterally.  Continue warfarin therapy.  Her INR is subtherapeutic will adjust her Coumadin dose  2. 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  Continue the same  3. History of pulmonary embolism and deep venous thrombosis in the past.  On anticoagulation therapy with warfarin, lifelong.  4. Strong family history of thrombophilia in multiple first degree relatives.   5. Anticardiolipin IgG antibody, slightly elevated at 24.   6. Continue to monitor her INRs    PLANS:    1. CBC reviewed  2. Change warfarin to 4 mg p.o. daily, INR in 5 days  3. Otherwise follow-up with me in 6 months  4. Patient to notify the office if she has surgery scheduled and will be bridged with Lovenox  5. All questions answered             I have reviewed labs results, imaging, vitals, and medications with the patient today. Will follow up in 6 months

## 2022-07-19 NOTE — PROGRESS NOTES
INR was 1.9 last night when she check it at home.  Pt advised to start taking 3.5mg daily since the level was elevated while taking 4mg daily.  She states that she did take 4mg last night since she took the level after the office had closed.  I advised her again to check the INR while the office is open from 8am to 430pm and call the results to the office. She v/u.

## 2022-07-20 RX ORDER — POTASSIUM CHLORIDE 20 MEQ/1
TABLET, EXTENDED RELEASE ORAL
Qty: 90 TABLET | Refills: 0 | OUTPATIENT
Start: 2022-07-20

## 2022-07-21 RX ORDER — POTASSIUM CHLORIDE 20 MEQ/1
TABLET, EXTENDED RELEASE ORAL
Qty: 90 TABLET | Refills: 0 | OUTPATIENT
Start: 2022-07-21

## 2022-07-22 ENCOUNTER — OFFICE VISIT (OUTPATIENT)
Dept: ONCOLOGY | Facility: CLINIC | Age: 82
End: 2022-07-22

## 2022-07-22 ENCOUNTER — LAB (OUTPATIENT)
Dept: LAB | Facility: HOSPITAL | Age: 82
End: 2022-07-22

## 2022-07-22 ENCOUNTER — HOSPITAL ENCOUNTER (OUTPATIENT)
Dept: ONCOLOGY | Facility: HOSPITAL | Age: 82
Setting detail: INFUSION SERIES
Discharge: HOME | End: 2022-07-22

## 2022-07-22 VITALS
DIASTOLIC BLOOD PRESSURE: 99 MMHG | HEART RATE: 86 BPM | HEIGHT: 66 IN | BODY MASS INDEX: 33.02 KG/M2 | TEMPERATURE: 96.9 F | SYSTOLIC BLOOD PRESSURE: 153 MMHG | RESPIRATION RATE: 22 BRPM

## 2022-07-22 DIAGNOSIS — Z51.81 ENCOUNTER FOR MONITORING COUMADIN THERAPY: ICD-10-CM

## 2022-07-22 DIAGNOSIS — Z79.01 LONG TERM (CURRENT) USE OF ANTICOAGULANTS: ICD-10-CM

## 2022-07-22 DIAGNOSIS — I82.220 IVC THROMBOSIS: ICD-10-CM

## 2022-07-22 DIAGNOSIS — I82.220 IVC THROMBOSIS: Primary | ICD-10-CM

## 2022-07-22 DIAGNOSIS — R76.0 ANTICARDIOLIPIN ANTIBODY POSITIVE: ICD-10-CM

## 2022-07-22 DIAGNOSIS — Z79.01 ENCOUNTER FOR MONITORING COUMADIN THERAPY: ICD-10-CM

## 2022-07-22 LAB
BASOPHILS # BLD AUTO: 0.03 10*3/MM3 (ref 0–0.2)
BASOPHILS NFR BLD AUTO: 0.3 % (ref 0–1.5)
DEPRECATED RDW RBC AUTO: 49.7 FL (ref 37–54)
EOSINOPHIL # BLD AUTO: 0.2 10*3/MM3 (ref 0–0.4)
EOSINOPHIL NFR BLD AUTO: 2 % (ref 0.3–6.2)
ERYTHROCYTE [DISTWIDTH] IN BLOOD BY AUTOMATED COUNT: 15.2 % (ref 12.3–15.4)
HCT VFR BLD AUTO: 48.8 % (ref 34–46.6)
HGB BLD-MCNC: 16.6 G/DL (ref 12–15.9)
INR PPP: 1.4 (ref 0.8–1.2)
LYMPHOCYTES # BLD AUTO: 4.67 10*3/MM3 (ref 0.7–3.1)
LYMPHOCYTES NFR BLD AUTO: 46.1 % (ref 19.6–45.3)
MCH RBC QN AUTO: 31 PG (ref 26.6–33)
MCHC RBC AUTO-ENTMCNC: 34 G/DL (ref 31.5–35.7)
MCV RBC AUTO: 91 FL (ref 79–97)
MONOCYTES # BLD AUTO: 1.12 10*3/MM3 (ref 0.1–0.9)
MONOCYTES NFR BLD AUTO: 11 % (ref 5–12)
NEUTROPHILS NFR BLD AUTO: 4.12 10*3/MM3 (ref 1.7–7)
NEUTROPHILS NFR BLD AUTO: 40.6 % (ref 42.7–76)
PLATELET # BLD AUTO: 274 10*3/MM3 (ref 140–450)
PMV BLD AUTO: 10.2 FL (ref 6–12)
RBC # BLD AUTO: 5.36 10*6/MM3 (ref 3.77–5.28)
WBC NRBC COR # BLD: 10.14 10*3/MM3 (ref 3.4–10.8)

## 2022-07-22 PROCEDURE — 85025 COMPLETE CBC W/AUTO DIFF WBC: CPT

## 2022-07-22 PROCEDURE — 99213 OFFICE O/P EST LOW 20 MIN: CPT | Performed by: INTERNAL MEDICINE

## 2022-07-22 PROCEDURE — 36416 COLLJ CAPILLARY BLOOD SPEC: CPT

## 2022-07-22 PROCEDURE — 85610 PROTHROMBIN TIME: CPT

## 2022-07-27 ENCOUNTER — LAB (OUTPATIENT)
Dept: LAB | Facility: HOSPITAL | Age: 82
End: 2022-07-27

## 2022-07-27 ENCOUNTER — HOSPITAL ENCOUNTER (OUTPATIENT)
Dept: ONCOLOGY | Facility: HOSPITAL | Age: 82
Setting detail: INFUSION SERIES
Discharge: HOME | End: 2022-07-27

## 2022-07-27 DIAGNOSIS — I82.220 IVC THROMBOSIS: ICD-10-CM

## 2022-07-27 DIAGNOSIS — R76.0 ANTICARDIOLIPIN ANTIBODY POSITIVE: ICD-10-CM

## 2022-07-27 DIAGNOSIS — Z79.01 LONG TERM (CURRENT) USE OF ANTICOAGULANTS: Primary | ICD-10-CM

## 2022-07-27 LAB — INR PPP: 1.3 (ref 0.8–1.2)

## 2022-07-27 PROCEDURE — 85610 PROTHROMBIN TIME: CPT

## 2022-07-27 PROCEDURE — 36416 COLLJ CAPILLARY BLOOD SPEC: CPT

## 2022-07-27 NOTE — PROGRESS NOTES
Patient came in with INR of 1.3. She states she does not remember being told to increase to 4mg. I asked patient to increase to 4mg and recheck next week on her home machine, patient verbalized understanding.

## 2022-08-03 ENCOUNTER — ANTICOAGULATION VISIT (OUTPATIENT)
Dept: ONCOLOGY | Facility: CLINIC | Age: 82
End: 2022-08-03

## 2022-08-03 ENCOUNTER — TELEPHONE (OUTPATIENT)
Dept: ONCOLOGY | Facility: CLINIC | Age: 82
End: 2022-08-03

## 2022-08-03 LAB — INR PPP: 2.9

## 2022-08-03 NOTE — PROGRESS NOTES
Received a message that the pt's INR is 2.9. I called the pt back and let her know that, per our protocol, she should recheck in 2-4 days since she was low last week. Pt preferred to check again in one week. I advised her to continue 4 mg of Warfarin. Pt verbalized understanding.

## 2022-08-03 NOTE — TELEPHONE ENCOUNTER
Caller: ORLY DOLL    Relationship to patient: SELF    Best call back number: [phone]    Patient is needing: HOME INR TEST RESULT 2.9 DONE 8-3-2022.

## 2022-08-10 LAB — INR PPP: 2.8

## 2022-08-11 ENCOUNTER — TELEPHONE (OUTPATIENT)
Dept: ONCOLOGY | Facility: CLINIC | Age: 82
End: 2022-08-11

## 2022-08-11 NOTE — TELEPHONE ENCOUNTER
Caller: Oryl Lynne    Relationship: Self    Best call back number: [phone]    What is the best time to reach you: ANY    Who are you requesting to speak with (clinical staff, provider,  specific staff member): DR. CLARKE'S NURSE    Do you know the name of the person who called: ORLY    What was the call regarding: PT INR FROM YESTERDAY WAS 2.8    Do you require a callback: YES

## 2022-08-11 NOTE — PROGRESS NOTES
INR 2.8, advised pt to continue 4 mg of Warfarin and recheck next week. Pt verbalized understanding.

## 2022-08-19 ENCOUNTER — TELEPHONE (OUTPATIENT)
Dept: ONCOLOGY | Facility: CLINIC | Age: 82
End: 2022-08-19

## 2022-08-19 LAB — INR PPP: 3.5

## 2022-08-19 NOTE — TELEPHONE ENCOUNTER
Caller: Orly Lynne    Relationship: Self    Best call back number:[phone]    What is the best time to reach you: ANY    Who are you requesting to speak with (clinical staff, provider,  specific staff member): CLINICAL STAFF    Do you know the name of the person who called: ORLY    What was the call regarding: PT INR FOR TODAY IS 3.5    Do you require a callback: YES, IF CHANGES NEEDED TO MEDICATION.

## 2022-08-22 NOTE — PROGRESS NOTES
Spoke to pt regarding her INR of 3.5. I told her that our protocol says that she should reduce by 1 mg. She stated that her INR has fluctuated quite a bit in the past and was more comfortable reducing to 3.5 mg and rechecking on Wednesday.

## 2022-08-24 LAB — INR PPP: 3.5

## 2022-08-25 ENCOUNTER — TELEPHONE (OUTPATIENT)
Dept: ONCOLOGY | Facility: CLINIC | Age: 82
End: 2022-08-25

## 2022-08-25 NOTE — TELEPHONE ENCOUNTER
Caller: Adelso Lynne    Relationship: Self    Best call back number: [phone]    What is the best time to reach you: ANYTIME    Who are you requesting to speak with (clinical staff, provider,  specific staff member): NURSE, DOCTOR    What was the call regarding: PT CALLED HER INR FOR 8/24 WAS 3.5.          Do you require a callback: YES

## 2022-08-26 NOTE — PROGRESS NOTES
Attempted to call pt regarding INR of 3.5. Message left on identifying VM letting her know that protocol states she should reduce to 2.5 mg, but can reduce to 3 mg per her previous preference if she is more comfortable with that since she reports that her INRs have fluctuated a lot in the past. Also advised her to recheck next week. Callback number left to discuss further.

## 2022-09-01 ENCOUNTER — TELEPHONE (OUTPATIENT)
Dept: ONCOLOGY | Facility: CLINIC | Age: 82
End: 2022-09-01

## 2022-09-01 LAB — INR PPP: 1.9

## 2022-09-01 NOTE — TELEPHONE ENCOUNTER
Caller: Adelso Lynne    Relationship: Self    Best call back number: [phone]    What is the best time to reach you: ANYTIME    Who are you requesting to speak with (clinical staff, provider,  specific staff member): DOCTOR, NURSE    What was the call regarding: INR READING FOR 8/31 1.9    Do you require a callback: YES

## 2022-09-01 NOTE — PROGRESS NOTES
INR 1.9, advised to increase to 3.5 mg of Warfarin and recheck next week. Pt verbalized understanding.

## 2022-09-07 LAB — INR PPP: 2.6

## 2022-09-08 ENCOUNTER — ANTICOAGULATION VISIT (OUTPATIENT)
Dept: ONCOLOGY | Facility: CLINIC | Age: 82
End: 2022-09-08

## 2022-09-08 NOTE — PROGRESS NOTES
Pt has home machine and tests INR weekly. Pt INR was 2.6 as of 9/7. Pt was advised to stay on same 3.5mg dose and recheck in 1 week. She v/u and had no other questions.

## 2022-09-14 LAB — INR PPP: 2.2

## 2022-09-16 ENCOUNTER — TELEPHONE (OUTPATIENT)
Dept: ONCOLOGY | Facility: CLINIC | Age: 82
End: 2022-09-16

## 2022-09-16 NOTE — PROGRESS NOTES
INR 2.2 on 9/14/22, advised pt to continue 3.5 mg and recheck next week. She verbalized understanding.

## 2022-09-21 LAB — INR PPP: 2

## 2022-09-22 ENCOUNTER — TELEPHONE (OUTPATIENT)
Dept: ONCOLOGY | Facility: CLINIC | Age: 82
End: 2022-09-22

## 2022-09-22 NOTE — PROGRESS NOTES
INR 2.0 on 9/21/22, advised pt to continue 3.5 mg of Warfarin and recheck next week. Pt verbalized understanding.

## 2022-10-05 LAB — INR PPP: 2.1

## 2022-10-06 ENCOUNTER — TELEPHONE (OUTPATIENT)
Dept: ONCOLOGY | Facility: CLINIC | Age: 82
End: 2022-10-06

## 2022-10-06 ENCOUNTER — ANTICOAGULATION VISIT (OUTPATIENT)
Dept: ONCOLOGY | Facility: CLINIC | Age: 82
End: 2022-10-06

## 2022-10-06 NOTE — TELEPHONE ENCOUNTER
Caller: ORLY    Relationship to patient: SELF    Best call back number: [phone]    Patient is needing:TO REPORT HER PT/INR RESUTL OF 2.1 YESTERDAY.

## 2022-10-06 NOTE — PROGRESS NOTES
Pt was advised to stay on same dose for now as INR was in range. Advised to recheck in 1 week. She v/u and had no other questions.
